# Patient Record
Sex: MALE | Race: WHITE | Employment: FULL TIME | URBAN - METROPOLITAN AREA
[De-identification: names, ages, dates, MRNs, and addresses within clinical notes are randomized per-mention and may not be internally consistent; named-entity substitution may affect disease eponyms.]

---

## 2017-01-23 ENCOUNTER — OFFICE VISIT (OUTPATIENT)
Dept: INTERNAL MEDICINE CLINIC | Age: 67
End: 2017-01-23

## 2017-01-23 VITALS
HEART RATE: 70 BPM | RESPIRATION RATE: 14 BRPM | SYSTOLIC BLOOD PRESSURE: 140 MMHG | DIASTOLIC BLOOD PRESSURE: 80 MMHG | WEIGHT: 216 LBS | BODY MASS INDEX: 34.72 KG/M2 | HEIGHT: 66 IN

## 2017-01-23 DIAGNOSIS — Z23 NEED FOR PNEUMOCOCCAL VACCINATION: ICD-10-CM

## 2017-01-23 DIAGNOSIS — F41.9 ANXIETY: ICD-10-CM

## 2017-01-23 DIAGNOSIS — I10 ESSENTIAL HYPERTENSION: Primary | ICD-10-CM

## 2017-01-23 DIAGNOSIS — I10 ESSENTIAL HYPERTENSION: ICD-10-CM

## 2017-01-23 DIAGNOSIS — E78.5 HYPERLIPIDEMIA, UNSPECIFIED HYPERLIPIDEMIA TYPE: ICD-10-CM

## 2017-01-23 DIAGNOSIS — Z11.59 NEED FOR HEPATITIS C SCREENING TEST: ICD-10-CM

## 2017-01-23 LAB
A/G RATIO: 1.4 (ref 1.1–2.2)
ALBUMIN SERPL-MCNC: 4 G/DL (ref 3.4–5)
ALP BLD-CCNC: 63 U/L (ref 40–129)
ALT SERPL-CCNC: 21 U/L (ref 10–40)
ANION GAP SERPL CALCULATED.3IONS-SCNC: 17 MMOL/L (ref 3–16)
AST SERPL-CCNC: 23 U/L (ref 15–37)
BASOPHILS ABSOLUTE: 0.1 K/UL (ref 0–0.2)
BASOPHILS RELATIVE PERCENT: 0.8 %
BILIRUB SERPL-MCNC: 0.4 MG/DL (ref 0–1)
BUN BLDV-MCNC: 18 MG/DL (ref 7–20)
CALCIUM SERPL-MCNC: 9.2 MG/DL (ref 8.3–10.6)
CHLORIDE BLD-SCNC: 101 MMOL/L (ref 99–110)
CO2: 24 MMOL/L (ref 21–32)
CREAT SERPL-MCNC: 0.9 MG/DL (ref 0.8–1.3)
EOSINOPHILS ABSOLUTE: 0.1 K/UL (ref 0–0.6)
EOSINOPHILS RELATIVE PERCENT: 1.3 %
GFR AFRICAN AMERICAN: >60
GFR NON-AFRICAN AMERICAN: >60
GLOBULIN: 2.8 G/DL
GLUCOSE BLD-MCNC: 83 MG/DL (ref 70–99)
HCT VFR BLD CALC: 42.7 % (ref 40.5–52.5)
HEMOGLOBIN: 14.4 G/DL (ref 13.5–17.5)
HEPATITIS C ANTIBODY INTERPRETATION: NORMAL
LYMPHOCYTES ABSOLUTE: 1.9 K/UL (ref 1–5.1)
LYMPHOCYTES RELATIVE PERCENT: 18.9 %
MCH RBC QN AUTO: 29.8 PG (ref 26–34)
MCHC RBC AUTO-ENTMCNC: 33.9 G/DL (ref 31–36)
MCV RBC AUTO: 87.9 FL (ref 80–100)
MONOCYTES ABSOLUTE: 1.2 K/UL (ref 0–1.3)
MONOCYTES RELATIVE PERCENT: 11.9 %
NEUTROPHILS ABSOLUTE: 6.7 K/UL (ref 1.7–7.7)
NEUTROPHILS RELATIVE PERCENT: 67.1 %
PDW BLD-RTO: 13.7 % (ref 12.4–15.4)
PLATELET # BLD: 230 K/UL (ref 135–450)
PMV BLD AUTO: 8.9 FL (ref 5–10.5)
POTASSIUM SERPL-SCNC: 3.9 MMOL/L (ref 3.5–5.1)
RBC # BLD: 4.85 M/UL (ref 4.2–5.9)
SODIUM BLD-SCNC: 142 MMOL/L (ref 136–145)
TOTAL PROTEIN: 6.8 G/DL (ref 6.4–8.2)
WBC # BLD: 10 K/UL (ref 4–11)

## 2017-01-23 PROCEDURE — 90471 IMMUNIZATION ADMIN: CPT | Performed by: INTERNAL MEDICINE

## 2017-01-23 PROCEDURE — 99213 OFFICE O/P EST LOW 20 MIN: CPT | Performed by: INTERNAL MEDICINE

## 2017-01-23 PROCEDURE — 90732 PPSV23 VACC 2 YRS+ SUBQ/IM: CPT | Performed by: INTERNAL MEDICINE

## 2017-01-23 RX ORDER — CITALOPRAM 20 MG/1
TABLET ORAL
Qty: 30 TABLET | Refills: 5 | Status: SHIPPED | OUTPATIENT
Start: 2017-01-23 | End: 2017-05-05 | Stop reason: SDUPTHER

## 2017-01-23 RX ORDER — TELMISARTAN AND HYDROCHLORTHIAZIDE 80; 25 MG/1; MG/1
TABLET ORAL
Qty: 30 TABLET | Refills: 5 | Status: SHIPPED | OUTPATIENT
Start: 2017-01-23 | End: 2017-05-05 | Stop reason: SDUPTHER

## 2017-01-23 RX ORDER — PRAVASTATIN SODIUM 80 MG/1
TABLET ORAL
Qty: 30 TABLET | Refills: 5 | Status: SHIPPED | OUTPATIENT
Start: 2017-01-23 | End: 2017-05-05 | Stop reason: SDUPTHER

## 2017-01-23 ASSESSMENT — ENCOUNTER SYMPTOMS
BACK PAIN: 0
WHEEZING: 0
ABDOMINAL PAIN: 0
NAUSEA: 0
VOMITING: 0
SHORTNESS OF BREATH: 0
RHINORRHEA: 0

## 2017-05-05 ENCOUNTER — OFFICE VISIT (OUTPATIENT)
Dept: INTERNAL MEDICINE CLINIC | Age: 67
End: 2017-05-05

## 2017-05-05 VITALS
BODY MASS INDEX: 35.2 KG/M2 | SYSTOLIC BLOOD PRESSURE: 120 MMHG | DIASTOLIC BLOOD PRESSURE: 80 MMHG | WEIGHT: 219 LBS | HEIGHT: 66 IN | RESPIRATION RATE: 14 BRPM | HEART RATE: 70 BPM

## 2017-05-05 DIAGNOSIS — Z00.00 ROUTINE GENERAL MEDICAL EXAMINATION AT A HEALTH CARE FACILITY: Primary | ICD-10-CM

## 2017-05-05 DIAGNOSIS — I10 ESSENTIAL HYPERTENSION: ICD-10-CM

## 2017-05-05 DIAGNOSIS — E78.5 HYPERLIPIDEMIA, UNSPECIFIED HYPERLIPIDEMIA TYPE: ICD-10-CM

## 2017-05-05 DIAGNOSIS — N40.0 HYPERTROPHY OF PROSTATE WITHOUT URINARY OBSTRUCTION: ICD-10-CM

## 2017-05-05 DIAGNOSIS — F41.9 ANXIETY: ICD-10-CM

## 2017-05-05 LAB
BILIRUBIN, POC: NORMAL
BLOOD URINE, POC: NORMAL
CLARITY, POC: NORMAL
COLOR, POC: NORMAL
GLUCOSE URINE, POC: NORMAL
KETONES, POC: NORMAL
LEUKOCYTE EST, POC: NORMAL
NITRITE, POC: NORMAL
PH, POC: NORMAL
PROSTATE SPECIFIC ANTIGEN: 2.29 NG/ML (ref 0–4)
PROTEIN, POC: NORMAL
SPECIFIC GRAVITY, POC: NORMAL
UROBILINOGEN, POC: NORMAL

## 2017-05-05 PROCEDURE — 99397 PER PM REEVAL EST PAT 65+ YR: CPT | Performed by: INTERNAL MEDICINE

## 2017-05-05 PROCEDURE — 81002 URINALYSIS NONAUTO W/O SCOPE: CPT | Performed by: INTERNAL MEDICINE

## 2017-05-05 RX ORDER — CITALOPRAM 20 MG/1
TABLET ORAL
Qty: 30 TABLET | Refills: 5 | Status: SHIPPED | OUTPATIENT
Start: 2017-05-05 | End: 2017-07-31 | Stop reason: SDUPTHER

## 2017-05-05 RX ORDER — PRAVASTATIN SODIUM 80 MG/1
TABLET ORAL
Qty: 30 TABLET | Refills: 5 | Status: SHIPPED | OUTPATIENT
Start: 2017-05-05 | End: 2017-07-31 | Stop reason: SDUPTHER

## 2017-05-05 RX ORDER — TELMISARTAN AND HYDROCHLORTHIAZIDE 80; 25 MG/1; MG/1
TABLET ORAL
Qty: 30 TABLET | Refills: 5 | Status: SHIPPED | OUTPATIENT
Start: 2017-05-05 | End: 2017-12-01 | Stop reason: SDUPTHER

## 2017-05-05 ASSESSMENT — PATIENT HEALTH QUESTIONNAIRE - PHQ9
2. FEELING DOWN, DEPRESSED OR HOPELESS: 0
1. LITTLE INTEREST OR PLEASURE IN DOING THINGS: 0
SUM OF ALL RESPONSES TO PHQ QUESTIONS 1-9: 0
SUM OF ALL RESPONSES TO PHQ9 QUESTIONS 1 & 2: 0

## 2017-05-05 ASSESSMENT — ENCOUNTER SYMPTOMS
NAUSEA: 0
VOMITING: 0
BACK PAIN: 0
ABDOMINAL PAIN: 0
WHEEZING: 0
RHINORRHEA: 0
SHORTNESS OF BREATH: 0

## 2017-07-31 RX ORDER — PRAVASTATIN SODIUM 80 MG/1
TABLET ORAL
Qty: 30 TABLET | Refills: 5 | Status: SHIPPED | OUTPATIENT
Start: 2017-07-31 | End: 2017-12-01 | Stop reason: SDUPTHER

## 2017-07-31 RX ORDER — CITALOPRAM 20 MG/1
TABLET ORAL
Qty: 30 TABLET | Refills: 5 | Status: SHIPPED | OUTPATIENT
Start: 2017-07-31 | End: 2017-12-01 | Stop reason: SDUPTHER

## 2017-12-01 ENCOUNTER — TELEPHONE (OUTPATIENT)
Dept: INTERNAL MEDICINE CLINIC | Age: 67
End: 2017-12-01

## 2017-12-01 ENCOUNTER — HOSPITAL ENCOUNTER (OUTPATIENT)
Dept: OTHER | Age: 67
Discharge: HOME | End: 2017-12-01
Attending: INTERNAL MEDICINE | Admitting: INTERNAL MEDICINE

## 2017-12-01 ENCOUNTER — OFFICE VISIT (OUTPATIENT)
Dept: INTERNAL MEDICINE CLINIC | Age: 67
End: 2017-12-01

## 2017-12-01 VITALS
OXYGEN SATURATION: 93 % | DIASTOLIC BLOOD PRESSURE: 80 MMHG | RESPIRATION RATE: 14 BRPM | HEIGHT: 66 IN | HEART RATE: 80 BPM | WEIGHT: 227 LBS | TEMPERATURE: 98.5 F | SYSTOLIC BLOOD PRESSURE: 138 MMHG | BODY MASS INDEX: 36.48 KG/M2

## 2017-12-01 DIAGNOSIS — Z23 NEED FOR INFLUENZA VACCINATION: ICD-10-CM

## 2017-12-01 DIAGNOSIS — R09.89 PULMONARY VASCULAR CONGESTION: Primary | ICD-10-CM

## 2017-12-01 DIAGNOSIS — J20.9 ACUTE BRONCHITIS, UNSPECIFIED ORGANISM: ICD-10-CM

## 2017-12-01 DIAGNOSIS — F41.9 ANXIETY: ICD-10-CM

## 2017-12-01 DIAGNOSIS — E78.5 HYPERLIPIDEMIA, UNSPECIFIED HYPERLIPIDEMIA TYPE: ICD-10-CM

## 2017-12-01 DIAGNOSIS — I10 ESSENTIAL HYPERTENSION: Primary | ICD-10-CM

## 2017-12-01 PROCEDURE — 90662 IIV NO PRSV INCREASED AG IM: CPT | Performed by: INTERNAL MEDICINE

## 2017-12-01 PROCEDURE — 99214 OFFICE O/P EST MOD 30 MIN: CPT | Performed by: INTERNAL MEDICINE

## 2017-12-01 PROCEDURE — 90471 IMMUNIZATION ADMIN: CPT | Performed by: INTERNAL MEDICINE

## 2017-12-01 RX ORDER — CITALOPRAM 20 MG/1
TABLET ORAL
Qty: 30 TABLET | Refills: 5 | Status: SHIPPED | OUTPATIENT
Start: 2017-12-01 | End: 2018-03-06 | Stop reason: SDUPTHER

## 2017-12-01 RX ORDER — TELMISARTAN AND HYDROCHLORTHIAZIDE 80; 25 MG/1; MG/1
TABLET ORAL
Qty: 30 TABLET | Refills: 5 | Status: SHIPPED | OUTPATIENT
Start: 2017-12-01 | End: 2018-03-06 | Stop reason: SDUPTHER

## 2017-12-01 RX ORDER — PROMETHAZINE HYDROCHLORIDE AND CODEINE PHOSPHATE 6.25; 1 MG/5ML; MG/5ML
5 SYRUP ORAL EVERY 4 HOURS PRN
Qty: 150 ML | Refills: 0 | Status: SHIPPED | OUTPATIENT
Start: 2017-12-01 | End: 2018-03-06

## 2017-12-01 RX ORDER — LEVOFLOXACIN 750 MG/1
750 TABLET ORAL DAILY
Qty: 5 TABLET | Refills: 0 | Status: SHIPPED | OUTPATIENT
Start: 2017-12-01 | End: 2017-12-06

## 2017-12-01 RX ORDER — ALBUTEROL SULFATE 90 UG/1
2 AEROSOL, METERED RESPIRATORY (INHALATION) EVERY 6 HOURS PRN
Qty: 1 INHALER | Refills: 3 | Status: SHIPPED | OUTPATIENT
Start: 2017-12-01 | End: 2018-03-06

## 2017-12-01 RX ORDER — PRAVASTATIN SODIUM 80 MG/1
TABLET ORAL
Qty: 30 TABLET | Refills: 5 | Status: SHIPPED | OUTPATIENT
Start: 2017-12-01 | End: 2018-03-06 | Stop reason: SDUPTHER

## 2017-12-01 ASSESSMENT — ENCOUNTER SYMPTOMS
BACK PAIN: 0
COUGH: 1
SHORTNESS OF BREATH: 1
RHINORRHEA: 0
ABDOMINAL PAIN: 0
WHEEZING: 0
NAUSEA: 0
VOMITING: 0

## 2017-12-01 NOTE — TELEPHONE ENCOUNTER
----- Message from Elen Mercado MD sent at 12/1/2017  4:39 PM EST -----  Mild congestion  Check ECHO: vascular congestion

## 2017-12-01 NOTE — PROGRESS NOTES
Subjective:      Patient ID: Marylee Sato is a 75 y.o. male. HPI    Patient is here for follow up of hypertension and hyperlipidemia. Patient's BP is controlled on current medications. No chest pain or dyspnea. His cholesterol is at goal. No myalgia. His anxiety is controlled. He has had a cough for one month. He is having some chest tightness. No fever. He is coughing up some yellow sputum. No chest pain. Review of Systems   Constitutional: Negative for activity change and appetite change. HENT: Negative for postnasal drip and rhinorrhea. Respiratory: Positive for cough and shortness of breath. Negative for wheezing. Cardiovascular: Negative for chest pain, palpitations and leg swelling. Gastrointestinal: Negative for abdominal pain, nausea and vomiting. Genitourinary: Negative for difficulty urinating and frequency. Musculoskeletal: Negative for back pain and joint swelling. Skin: Negative for rash. Neurological: Negative for light-headedness. Psychiatric/Behavioral: Positive for sleep disturbance. Current Outpatient Prescriptions   Medication Sig Dispense Refill    pravastatin (PRAVACHOL) 80 MG tablet TAKE ONE TABLET DAILY AT BEDTIME 30 tablet 5    citalopram (CELEXA) 20 MG tablet TAKE ONE TABLET DAILY 30 tablet 5    telmisartan-hydrochlorothiazide (MICARDIS HCT) 80-25 MG per tablet TAKE ONE TABLET DAILY 30 tablet 5    promethazine-codeine (PHENERGAN WITH CODEINE) 6.25-10 MG/5ML syrup Take 5 mLs by mouth every 4 hours as needed for Cough . 150 mL 0    levofloxacin (LEVAQUIN) 750 MG tablet Take 1 tablet by mouth daily for 5 days 5 tablet 0     No current facility-administered medications for this visit. There are no changes to past medical history, family history, social history or review of systems(except as noted in the history section) since prior note (all reviewed with patient).     /80 (Site: Left Arm, Position: Sitting, Cuff Size: Large Adult)

## 2017-12-20 ENCOUNTER — HOSPITAL ENCOUNTER (OUTPATIENT)
Dept: NON INVASIVE DIAGNOSTICS | Age: 67
Discharge: HOME | End: 2017-12-20
Attending: INTERNAL MEDICINE | Admitting: INTERNAL MEDICINE

## 2017-12-20 DIAGNOSIS — E78.5 HYPERLIPIDEMIA, UNSPECIFIED HYPERLIPIDEMIA TYPE: ICD-10-CM

## 2017-12-20 DIAGNOSIS — R09.89 OTHER SPECIFIED SYMPTOMS AND SIGNS INVOLVING THE CIRCULATORY AND RESPIRATORY SYSTEMS: ICD-10-CM

## 2017-12-20 LAB
A/G RATIO: 1.4 (ref 1.1–2.2)
ALBUMIN SERPL-MCNC: 4.2 G/DL (ref 3.4–5)
ALP BLD-CCNC: 61 U/L (ref 40–129)
ALT SERPL-CCNC: 18 U/L (ref 10–40)
ANION GAP SERPL CALCULATED.3IONS-SCNC: 15 MMOL/L (ref 3–16)
AST SERPL-CCNC: 20 U/L (ref 15–37)
BASOPHILS ABSOLUTE: 0 K/UL (ref 0–0.2)
BASOPHILS RELATIVE PERCENT: 0.3 %
BILIRUB SERPL-MCNC: 0.7 MG/DL (ref 0–1)
BUN BLDV-MCNC: 17 MG/DL (ref 7–20)
CALCIUM SERPL-MCNC: 9.8 MG/DL (ref 8.3–10.6)
CHLORIDE BLD-SCNC: 102 MMOL/L (ref 99–110)
CHOLESTEROL, TOTAL: 166 MG/DL (ref 0–199)
CO2: 26 MMOL/L (ref 21–32)
CREAT SERPL-MCNC: 0.9 MG/DL (ref 0.8–1.3)
EOSINOPHILS ABSOLUTE: 0.1 K/UL (ref 0–0.6)
EOSINOPHILS RELATIVE PERCENT: 1.5 %
GFR AFRICAN AMERICAN: >60
GFR NON-AFRICAN AMERICAN: >60
GLOBULIN: 3.1 G/DL
GLUCOSE BLD-MCNC: 89 MG/DL (ref 70–99)
HCT VFR BLD CALC: 45.2 % (ref 40.5–52.5)
HDLC SERPL-MCNC: 61 MG/DL (ref 40–60)
HEMOGLOBIN: 15.2 G/DL (ref 13.5–17.5)
LDL CHOLESTEROL CALCULATED: 86 MG/DL
LV EF: 55 %
LVEF MODALITY: NORMAL
LYMPHOCYTES ABSOLUTE: 1.6 K/UL (ref 1–5.1)
LYMPHOCYTES RELATIVE PERCENT: 21.6 %
MCH RBC QN AUTO: 30.2 PG (ref 26–34)
MCHC RBC AUTO-ENTMCNC: 33.6 G/DL (ref 31–36)
MCV RBC AUTO: 89.8 FL (ref 80–100)
MONOCYTES ABSOLUTE: 0.7 K/UL (ref 0–1.3)
MONOCYTES RELATIVE PERCENT: 8.9 %
NEUTROPHILS ABSOLUTE: 5.1 K/UL (ref 1.7–7.7)
NEUTROPHILS RELATIVE PERCENT: 67.7 %
PDW BLD-RTO: 13.8 % (ref 12.4–15.4)
PLATELET # BLD: 219 K/UL (ref 135–450)
PMV BLD AUTO: 9.1 FL (ref 5–10.5)
POTASSIUM SERPL-SCNC: 4.1 MMOL/L (ref 3.5–5.1)
RBC # BLD: 5.04 M/UL (ref 4.2–5.9)
SODIUM BLD-SCNC: 143 MMOL/L (ref 136–145)
TOTAL PROTEIN: 7.3 G/DL (ref 6.4–8.2)
TRIGL SERPL-MCNC: 96 MG/DL (ref 0–150)
TSH SERPL DL<=0.05 MIU/L-ACNC: 2.52 UIU/ML (ref 0.27–4.2)
URIC ACID, SERUM: 7.2 MG/DL (ref 3.5–7.2)
VLDLC SERPL CALC-MCNC: 19 MG/DL
WBC # BLD: 7.5 K/UL (ref 4–11)

## 2018-02-05 ENCOUNTER — TELEPHONE (OUTPATIENT)
Dept: INTERNAL MEDICINE CLINIC | Age: 68
End: 2018-02-05

## 2018-02-05 RX ORDER — AZITHROMYCIN 250 MG/1
TABLET, FILM COATED ORAL
Qty: 1 PACKET | Refills: 0 | Status: SHIPPED | OUTPATIENT
Start: 2018-02-05 | End: 2018-03-06

## 2018-02-05 NOTE — TELEPHONE ENCOUNTER
----- Message from Elizabeth Tejeda MD sent at 2/5/2018 11:52 AM EST -----  Contact: pt [phone]  yes  ----- Message -----  From: Lazaro Carnes  Sent: 2/5/2018  11:16 AM  To: Elizabeth Tejeda MD    Interaction with citalopram Is this ok?  ----- Message -----  From: Elizabeth Tejeda MD  Sent: 2/5/2018  10:31 AM  To: Lazaro Carnes    Z carolin  ----- Message -----  From: Allison Vidal  Sent: 2/5/2018   8:40 AM  To: Elizabeth Tejeda MD    Pt wife was in last week and saw [employer], and was prescribed Z carolin. Wife has gotten much better. Pt now has same symptoms wife had, and was wondering if you could prescribe z carolin for him. I told him you might want to come in, and he said he didn't want to drive in the weather, but if he has to he will. If you are willing, please send to [facility].    Last 12-1-17  Next 3-6-18  MT

## 2018-03-06 ENCOUNTER — OFFICE VISIT (OUTPATIENT)
Dept: INTERNAL MEDICINE CLINIC | Age: 68
End: 2018-03-06

## 2018-03-06 VITALS
HEART RATE: 84 BPM | SYSTOLIC BLOOD PRESSURE: 132 MMHG | BODY MASS INDEX: 36 KG/M2 | HEIGHT: 66 IN | RESPIRATION RATE: 14 BRPM | WEIGHT: 224 LBS | DIASTOLIC BLOOD PRESSURE: 80 MMHG

## 2018-03-06 DIAGNOSIS — I34.0 MILD MITRAL REGURGITATION: ICD-10-CM

## 2018-03-06 DIAGNOSIS — E78.5 HYPERLIPIDEMIA, UNSPECIFIED HYPERLIPIDEMIA TYPE: ICD-10-CM

## 2018-03-06 DIAGNOSIS — I10 ESSENTIAL HYPERTENSION: Primary | ICD-10-CM

## 2018-03-06 DIAGNOSIS — I35.1 MODERATE AORTIC INSUFFICIENCY: ICD-10-CM

## 2018-03-06 DIAGNOSIS — F41.9 ANXIETY: ICD-10-CM

## 2018-03-06 DIAGNOSIS — N40.0 HYPERTROPHY OF PROSTATE WITHOUT URINARY OBSTRUCTION: ICD-10-CM

## 2018-03-06 PROCEDURE — 99214 OFFICE O/P EST MOD 30 MIN: CPT | Performed by: INTERNAL MEDICINE

## 2018-03-06 RX ORDER — PRAVASTATIN SODIUM 80 MG/1
TABLET ORAL
Qty: 30 TABLET | Refills: 5 | Status: SHIPPED | OUTPATIENT
Start: 2018-03-06 | End: 2019-01-22 | Stop reason: SDUPTHER

## 2018-03-06 RX ORDER — TELMISARTAN AND HYDROCHLORTHIAZIDE 80; 25 MG/1; MG/1
TABLET ORAL
Qty: 30 TABLET | Refills: 5 | Status: SHIPPED | OUTPATIENT
Start: 2018-03-06 | End: 2019-01-22 | Stop reason: SDUPTHER

## 2018-03-06 RX ORDER — CITALOPRAM 20 MG/1
TABLET ORAL
Qty: 30 TABLET | Refills: 5 | Status: SHIPPED | OUTPATIENT
Start: 2018-03-06 | End: 2019-01-22 | Stop reason: SDUPTHER

## 2018-03-06 ASSESSMENT — ENCOUNTER SYMPTOMS
ABDOMINAL PAIN: 0
VOMITING: 0
RHINORRHEA: 0
NAUSEA: 0
SHORTNESS OF BREATH: 0
WHEEZING: 0
COUGH: 0
BACK PAIN: 0

## 2018-03-06 NOTE — PATIENT INSTRUCTIONS
variety of cut-up vegetables with a low-fat dip as an appetizer instead of chips and dip. ¨ Sprinkle sunflower seeds or chopped almonds over salads. Or try adding chopped walnuts or almonds to cooked vegetables. ¨ Try some vegetarian meals using beans and peas. Add garbanzo or kidney beans to salads. Make burritos and tacos with mashed vasquez beans or black beans. Where can you learn more? Go to [email or web address]. org and sign in to your [employer] account. Enter R576 in the [employer] box to learn more about \"DASH Diet: Care Instructions. \"     If you do not have an account, please click on the \"Sign Up Now\" link. Current as of: September 21, 2016  Content Version: 11.5  © 5492-9109 Healthwise, Incorporated. Care instructions adapted under license by [facility] ([facility]). If you have questions about a medical condition or this instruction, always ask your healthcare professional. [address] any warranty or liability for your use of this information.

## 2018-03-06 NOTE — PROGRESS NOTES
Subjective:      Patient ID: Gentry Terrazas is a 75 y.o. . HPI    Patient is here for follow up of hypertension and hyperlipidemia. He had two episodes of syncope associated with coughing. He had LOC for a few seconds. His ECHO showed moderate AI and mild MR. Patient's BP is controlled on current medications. No chest pain or dyspnea. His cholesterol is at goal. No myalgia. His anxiety is controlled. His cough is resolved. He is urinating without any issues. No urgency or increased frequency. Review of Systems   Constitutional: Negative for activity change and appetite change. HENT: Negative for postnasal drip and rhinorrhea. Respiratory: Negative for cough, shortness of breath and wheezing. Cardiovascular: Negative for chest pain, palpitations and leg swelling. Gastrointestinal: Negative for abdominal pain, nausea and vomiting. Genitourinary: Negative for difficulty urinating and frequency. Musculoskeletal: Negative for back pain and joint swelling. Skin: Negative for rash. Neurological: Negative for light-headedness. Psychiatric/Behavioral: Negative for sleep disturbance. Current Outpatient Prescriptions   Medication Sig Dispense Refill    pravastatin (PRAVACHOL) 80 MG tablet TAKE ONE TABLET DAILY AT BEDTIME 30 tablet 5    citalopram (CELEXA) 20 MG tablet TAKE ONE TABLET DAILY 30 tablet 5    telmisartan-hydrochlorothiazide (MICARDIS HCT) 80-25 MG per tablet TAKE ONE TABLET DAILY 30 tablet 5     No current facility-administered medications for this visit. There are no changes to past medical history, family history, social history or review of systems(except as noted in the history section) since prior note (all reviewed with patient).     /80 (Site: Left Arm, Position: Sitting, Cuff Size: Large Adult)   Pulse 84   Resp 14   Ht 5' 6\" (1.676 m)   Wt 224 lb (101.6 kg)   BMI 36.15 kg/m²      Objective:   Physical Exam   Constitutional: He appears instructions  Was a self-tracking handout given in paper form or via [employer]? Yes    Requested Prescriptions     Pending Prescriptions Disp Refills    telmisartan-hydrochlorothiazide (MICARDIS HCT) 80-25 MG per tablet 30 tablet 5     Sig: TAKE ONE TABLET DAILY    citalopram (CELEXA) 20 MG tablet 30 tablet 5     Sig: TAKE ONE TABLET DAILY    pravastatin (PRAVACHOL) 80 MG tablet 30 tablet 5     Sig: TAKE ONE TABLET DAILY AT BEDTIME       All patient questions answered. Patient voiced understanding. Quality Measures    Body mass index is 36.15 kg/m². Elevated. Weight control planned discussed conventional weight loss and Healthy diet and regular exercise. BP: 132/80. Blood pressure is normal. Treatment plan consists of No treatment change needed. Fall Risk 5/5/2017 3/7/2016 1/26/2015   2 or more falls in past year? no no no   Fall with injury in past year? no no no     The patient does not have a history of falls. I did not - not indicated , complete a risk assessment for falls. A plan of care for falls No Treatment plan indicated    Lab Results   Component Value Date    LDLCALC 86 12/20/2017    (goal LDL reduction with dx if diabetes is 50% LDL reduction)    PHQ Scores 5/5/2017 3/7/2016 1/26/2015   PHQ2 Score 0 0 0   PHQ9 Score 0 0 0     Interpretation of Total Score Depression Severity: 1-4 = Minimal depression, 5-9 = Mild depression, 10-14 = Moderate depression, 15-19 = Moderately severe depression, 20-27 = Severe depression       Decrease calorie intake. Exercise,weight loss recommended. The current medical regimen is effective;  continue present plan and medications. See orders.

## 2018-06-21 ENCOUNTER — TELEPHONE (OUTPATIENT)
Dept: INTERNAL MEDICINE CLINIC | Age: 68
End: 2018-06-21

## 2018-09-04 ENCOUNTER — OFFICE VISIT (OUTPATIENT)
Dept: INTERNAL MEDICINE CLINIC | Age: 68
End: 2018-09-04

## 2018-09-04 VITALS
BODY MASS INDEX: 35.68 KG/M2 | WEIGHT: 222 LBS | HEIGHT: 66 IN | SYSTOLIC BLOOD PRESSURE: 110 MMHG | HEART RATE: 80 BPM | DIASTOLIC BLOOD PRESSURE: 80 MMHG | RESPIRATION RATE: 14 BRPM

## 2018-09-04 DIAGNOSIS — F41.9 ANXIETY: ICD-10-CM

## 2018-09-04 DIAGNOSIS — I35.1 MODERATE AORTIC INSUFFICIENCY: ICD-10-CM

## 2018-09-04 DIAGNOSIS — I34.0 MILD MITRAL REGURGITATION: ICD-10-CM

## 2018-09-04 DIAGNOSIS — E78.5 HYPERLIPIDEMIA, UNSPECIFIED HYPERLIPIDEMIA TYPE: ICD-10-CM

## 2018-09-04 DIAGNOSIS — Z01.818 PREOP EXAM FOR INTERNAL MEDICINE: Primary | ICD-10-CM

## 2018-09-04 DIAGNOSIS — H57.9 LEFT EYE COMPLAINT: ICD-10-CM

## 2018-09-04 DIAGNOSIS — I10 ESSENTIAL HYPERTENSION: ICD-10-CM

## 2018-09-04 PROCEDURE — 99214 OFFICE O/P EST MOD 30 MIN: CPT | Performed by: INTERNAL MEDICINE

## 2018-09-04 NOTE — PROGRESS NOTES
(1.676 m)   Wt 222 lb (100.7 kg)   BMI 35.83 kg/m²     General Appearance:  Alert, cooperative, no distress, appears stated age   Head:  Normocephalic, without obvious abnormality, atraumatic   Eyes:  PERRL, conjunctiva/corneas clear, EOM's intact, fundi benign, both eyes   Ears:  deferred   Nose: Nares normal, septum midline, mucosa normal, no drainage or sinus tenderness   Throat: Lips, mucosa, and tongue normal; teeth and gums normal   Neck: Supple, symmetrical, trachea midline, no adenopathy, thyroid: not enlarged, symmetric, no tenderness/mass/nodules, no carotid bruit or JVD   Back:   deferred   Lungs:   Clear to auscultation bilaterally, respirations unlabored   Chest Wall:  No tenderness or deformity   Heart:  Regular rate and rhythm, S1, S2 normal, no murmur, rub or gallop   Abdomen:   Soft, non-tender, bowel sounds active all four quadrants,  no masses, no organomegaly   Genitalia:  deferred   Rectal:  deferred   Extremities: Extremities normal, atraumatic, no cyanosis or edema   Pulses: 2+ and symmetric   Skin: Skin color, texture, turgor normal, no rashes or lesions   Lymph nodes: Cervical, supraclavicular, and axillary nodes normal   Neurologic: Normal                Assessment:       Diagnosis Orders   1. Preop exam for internal medicine     2. Left eye complaint     3. Essential hypertension     4. Hyperlipidemia, unspecified hyperlipidemia type     5. Moderate aortic insufficiency     6. Mild mitral regurgitation     7. Anxiety           70 y.o. male with planned surgery as above. Plan:     Patient medically cleared for above planned procedure.

## 2019-01-22 ENCOUNTER — OFFICE VISIT (OUTPATIENT)
Dept: INTERNAL MEDICINE CLINIC | Age: 69
End: 2019-01-22

## 2019-01-22 VITALS
HEIGHT: 66 IN | RESPIRATION RATE: 14 BRPM | WEIGHT: 224 LBS | SYSTOLIC BLOOD PRESSURE: 120 MMHG | HEART RATE: 70 BPM | DIASTOLIC BLOOD PRESSURE: 80 MMHG | BODY MASS INDEX: 36 KG/M2

## 2019-01-22 DIAGNOSIS — E78.5 HYPERLIPIDEMIA, UNSPECIFIED HYPERLIPIDEMIA TYPE: ICD-10-CM

## 2019-01-22 DIAGNOSIS — I34.0 MILD MITRAL REGURGITATION: ICD-10-CM

## 2019-01-22 DIAGNOSIS — I10 ESSENTIAL HYPERTENSION: Primary | ICD-10-CM

## 2019-01-22 DIAGNOSIS — I35.1 MODERATE AORTIC INSUFFICIENCY: ICD-10-CM

## 2019-01-22 DIAGNOSIS — N40.0 HYPERTROPHY OF PROSTATE WITHOUT URINARY OBSTRUCTION: ICD-10-CM

## 2019-01-22 DIAGNOSIS — F41.9 ANXIETY: ICD-10-CM

## 2019-01-22 PROCEDURE — 99214 OFFICE O/P EST MOD 30 MIN: CPT | Performed by: INTERNAL MEDICINE

## 2019-01-22 RX ORDER — TELMISARTAN AND HYDROCHLORTHIAZIDE 80; 25 MG/1; MG/1
TABLET ORAL
Qty: 30 TABLET | Refills: 5 | Status: SHIPPED | OUTPATIENT
Start: 2019-01-22 | End: 2019-04-16 | Stop reason: SDUPTHER

## 2019-01-22 RX ORDER — PRAVASTATIN SODIUM 80 MG/1
TABLET ORAL
Qty: 30 TABLET | Refills: 5 | Status: SHIPPED | OUTPATIENT
Start: 2019-01-22 | End: 2019-04-16 | Stop reason: SDUPTHER

## 2019-01-22 RX ORDER — CITALOPRAM 20 MG/1
TABLET ORAL
Qty: 30 TABLET | Refills: 5 | Status: SHIPPED | OUTPATIENT
Start: 2019-01-22 | End: 2019-04-16 | Stop reason: SDUPTHER

## 2019-01-22 ASSESSMENT — ENCOUNTER SYMPTOMS
ABDOMINAL PAIN: 0
RHINORRHEA: 0
SHORTNESS OF BREATH: 0
COUGH: 0
NAUSEA: 0
BACK PAIN: 0
WHEEZING: 0
VOMITING: 0

## 2019-01-23 DIAGNOSIS — I10 ESSENTIAL HYPERTENSION: ICD-10-CM

## 2019-01-23 LAB
A/G RATIO: 1.5 (ref 1.1–2.2)
ALBUMIN SERPL-MCNC: 4.2 G/DL (ref 3.4–5)
ALP BLD-CCNC: 57 U/L (ref 40–129)
ALT SERPL-CCNC: 18 U/L (ref 10–40)
ANION GAP SERPL CALCULATED.3IONS-SCNC: 15 MMOL/L (ref 3–16)
AST SERPL-CCNC: 21 U/L (ref 15–37)
BASOPHILS ABSOLUTE: 0 K/UL (ref 0–0.2)
BASOPHILS RELATIVE PERCENT: 0.5 %
BILIRUB SERPL-MCNC: 0.8 MG/DL (ref 0–1)
BUN BLDV-MCNC: 16 MG/DL (ref 7–20)
CALCIUM SERPL-MCNC: 9.6 MG/DL (ref 8.3–10.6)
CHLORIDE BLD-SCNC: 103 MMOL/L (ref 99–110)
CHOLESTEROL, TOTAL: 182 MG/DL (ref 0–199)
CO2: 26 MMOL/L (ref 21–32)
CREAT SERPL-MCNC: 0.8 MG/DL (ref 0.8–1.3)
EOSINOPHILS ABSOLUTE: 0.1 K/UL (ref 0–0.6)
EOSINOPHILS RELATIVE PERCENT: 1.8 %
GFR AFRICAN AMERICAN: >60
GFR NON-AFRICAN AMERICAN: >60
GLOBULIN: 2.8 G/DL
GLUCOSE BLD-MCNC: 88 MG/DL (ref 70–99)
HCT VFR BLD CALC: 45.6 % (ref 40.5–52.5)
HDLC SERPL-MCNC: 49 MG/DL (ref 40–60)
HEMOGLOBIN: 15.8 G/DL (ref 13.5–17.5)
LDL CHOLESTEROL CALCULATED: 109 MG/DL
LYMPHOCYTES ABSOLUTE: 1.8 K/UL (ref 1–5.1)
LYMPHOCYTES RELATIVE PERCENT: 24.3 %
MCH RBC QN AUTO: 31 PG (ref 26–34)
MCHC RBC AUTO-ENTMCNC: 34.6 G/DL (ref 31–36)
MCV RBC AUTO: 89.6 FL (ref 80–100)
MONOCYTES ABSOLUTE: 0.7 K/UL (ref 0–1.3)
MONOCYTES RELATIVE PERCENT: 9.8 %
NEUTROPHILS ABSOLUTE: 4.7 K/UL (ref 1.7–7.7)
NEUTROPHILS RELATIVE PERCENT: 63.6 %
PDW BLD-RTO: 13.7 % (ref 12.4–15.4)
PLATELET # BLD: 187 K/UL (ref 135–450)
PMV BLD AUTO: 9.1 FL (ref 5–10.5)
POTASSIUM SERPL-SCNC: 3.9 MMOL/L (ref 3.5–5.1)
PROSTATE SPECIFIC ANTIGEN: 2.14 NG/ML (ref 0–4)
RBC # BLD: 5.09 M/UL (ref 4.2–5.9)
SODIUM BLD-SCNC: 144 MMOL/L (ref 136–145)
TOTAL PROTEIN: 7 G/DL (ref 6.4–8.2)
TRIGL SERPL-MCNC: 121 MG/DL (ref 0–150)
URIC ACID, SERUM: 7.1 MG/DL (ref 3.5–7.2)
VLDLC SERPL CALC-MCNC: 24 MG/DL
WBC # BLD: 7.4 K/UL (ref 4–11)

## 2019-04-16 ENCOUNTER — OFFICE VISIT (OUTPATIENT)
Dept: INTERNAL MEDICINE CLINIC | Age: 69
End: 2019-04-16

## 2019-04-16 VITALS
HEART RATE: 50 BPM | RESPIRATION RATE: 14 BRPM | SYSTOLIC BLOOD PRESSURE: 130 MMHG | TEMPERATURE: 99 F | HEIGHT: 66 IN | DIASTOLIC BLOOD PRESSURE: 80 MMHG | BODY MASS INDEX: 35.84 KG/M2 | WEIGHT: 223 LBS

## 2019-04-16 DIAGNOSIS — I35.1 MODERATE AORTIC INSUFFICIENCY: ICD-10-CM

## 2019-04-16 DIAGNOSIS — H26.9 CATARACT OF LEFT EYE, UNSPECIFIED CATARACT TYPE: ICD-10-CM

## 2019-04-16 DIAGNOSIS — I34.0 MILD MITRAL REGURGITATION: ICD-10-CM

## 2019-04-16 DIAGNOSIS — I10 ESSENTIAL HYPERTENSION: ICD-10-CM

## 2019-04-16 DIAGNOSIS — E78.5 HYPERLIPIDEMIA, UNSPECIFIED HYPERLIPIDEMIA TYPE: ICD-10-CM

## 2019-04-16 DIAGNOSIS — Z01.818 PREOP EXAM FOR INTERNAL MEDICINE: Primary | ICD-10-CM

## 2019-04-16 DIAGNOSIS — F41.9 ANXIETY: ICD-10-CM

## 2019-04-16 PROCEDURE — 99214 OFFICE O/P EST MOD 30 MIN: CPT | Performed by: INTERNAL MEDICINE

## 2019-04-16 RX ORDER — ALBUTEROL SULFATE 90 UG/1
2 AEROSOL, METERED RESPIRATORY (INHALATION) 4 TIMES DAILY PRN
Qty: 1 INHALER | Refills: 0 | Status: SHIPPED | OUTPATIENT
Start: 2019-04-16 | End: 2020-08-04 | Stop reason: ALTCHOICE

## 2019-04-16 RX ORDER — TELMISARTAN AND HYDROCHLORTHIAZIDE 80; 25 MG/1; MG/1
TABLET ORAL
Qty: 90 TABLET | Refills: 3 | Status: SHIPPED | OUTPATIENT
Start: 2019-04-16 | End: 2019-10-30 | Stop reason: SDUPTHER

## 2019-04-16 RX ORDER — PRAVASTATIN SODIUM 80 MG/1
TABLET ORAL
Qty: 90 TABLET | Refills: 3 | Status: SHIPPED | OUTPATIENT
Start: 2019-04-16 | End: 2019-08-12

## 2019-04-16 RX ORDER — CITALOPRAM 20 MG/1
TABLET ORAL
Qty: 90 TABLET | Refills: 3 | Status: SHIPPED | OUTPATIENT
Start: 2019-04-16 | End: 2019-04-16 | Stop reason: ALTCHOICE

## 2019-04-16 RX ORDER — ESCITALOPRAM OXALATE 10 MG/1
10 TABLET ORAL DAILY
Qty: 30 TABLET | Refills: 2 | Status: SHIPPED | OUTPATIENT
Start: 2019-04-16 | End: 2020-03-25 | Stop reason: SDUPTHER

## 2019-04-16 RX ORDER — AZITHROMYCIN 250 MG/1
250 TABLET, FILM COATED ORAL SEE ADMIN INSTRUCTIONS
Qty: 6 TABLET | Refills: 0 | Status: ON HOLD | OUTPATIENT
Start: 2019-04-16 | End: 2019-04-20 | Stop reason: HOSPADM

## 2019-04-16 ASSESSMENT — PATIENT HEALTH QUESTIONNAIRE - PHQ9
SUM OF ALL RESPONSES TO PHQ9 QUESTIONS 1 & 2: 0
SUM OF ALL RESPONSES TO PHQ QUESTIONS 1-9: 0
SUM OF ALL RESPONSES TO PHQ QUESTIONS 1-9: 0
1. LITTLE INTEREST OR PLEASURE IN DOING THINGS: 0
2. FEELING DOWN, DEPRESSED OR HOPELESS: 0

## 2019-04-16 NOTE — PROGRESS NOTES
Subjective:      Estefania Bradford is a 70 y.o. male who presents to the office today for a preoperative consultation at the request of surgeon Dr Wolf España who plans on performing left eye phaco and IOL.    Planned anesthesia is Local.       Past Medical History:   Diagnosis Date    Anxiety 12/21/2012    Hyperlipidemia     Hypertension     Hypertrophy of prostate without urinary obstruction     Mild mitral regurgitation 3/6/2018    Moderate aortic insufficiency 3/6/2018     Past Surgical History:   Procedure Laterality Date    COLONOSCOPY  4/21/2008    VITRECTOMY Left 2018     Family History   Problem Relation Age of Onset    Cancer Brother 62        prostate cancer    Other Mother         Lung fibrosis    Cancer Father         Prostate cancer    No Known Problems Sister      Social History     Socioeconomic History    Marital status:      Spouse name: None    Number of children: None    Years of education: None    Highest education level: None   Occupational History    None   Social Needs    Financial resource strain: None    Food insecurity:     Worry: None     Inability: None    Transportation needs:     Medical: None     Non-medical: None   Tobacco Use    Smoking status: Never Smoker    Smokeless tobacco: Never Used   Substance and Sexual Activity    Alcohol use: Yes     Comment: rarely    Drug use: No    Sexual activity: Yes     Partners: Female   Lifestyle    Physical activity:     Days per week: None     Minutes per session: None    Stress: None   Relationships    Social connections:     Talks on phone: None     Gets together: None     Attends Orthodox service: None     Active member of club or organization: None     Attends meetings of clubs or organizations: None     Relationship status: None    Intimate partner violence:     Fear of current or ex partner: None     Emotionally abused: None     Physically abused: None     Forced sexual activity: None   Other Topics Concern    None   Social History Narrative    None     Current Outpatient Medications   Medication Sig Dispense Refill    telmisartan-hydrochlorothiazide (MICARDIS HCT) 80-25 MG per tablet TAKE ONE TABLET DAILY 90 tablet 3    pravastatin (PRAVACHOL) 80 MG tablet TAKE ONE TABLET DAILY AT BEDTIME 90 tablet 3    azithromycin (ZITHROMAX) 250 MG tablet Take 1 tablet by mouth See Admin Instructions for 5 days 500mg on day 1 followed by 250mg on days 2 - 5 6 tablet 0    albuterol sulfate  (90 Base) MCG/ACT inhaler Inhale 2 puffs into the lungs 4 times daily as needed for Wheezing 1 Inhaler 0    escitalopram (LEXAPRO) 10 MG tablet Take 1 tablet by mouth daily 30 tablet 2     No current facility-administered medications for this visit. No Known Allergies  Review of Systems  A comprehensive review of systems was negative.      Planned anesthesia: Local  Known anesthesia problems: no  Bleeding risk: no  Personal or FH of DVT/PE: no  Patient objection to receiving blood products: no     Objective:      /80 (Site: Left Upper Arm, Position: Sitting, Cuff Size: Large Adult)   Pulse 50   Temp 99 °F (37.2 °C) (Oral)   Resp 14   Ht 5' 6\" (1.676 m)   Wt 223 lb (101.2 kg)   BMI 35.99 kg/m²     General Appearance:  Alert, cooperative, no distress, appears stated age   Head:  Normocephalic, without obvious abnormality, atraumatic   Eyes:  PERRL, conjunctiva/corneas clear, EOM's intact, left eye catarac   Ears:  deferred   Nose: Nares normal, septum midline, mucosa normal, no drainage or sinus tenderness   Throat: Lips, mucosa, and tongue normal; teeth and gums normal   Neck: Supple, symmetrical, trachea midline, no adenopathy, thyroid: not enlarged, symmetric, no tenderness/mass/nodules, no carotid bruit or JVD   Back:   Symmetric, no curvature, ROM normal, no CVA tenderness   Lungs:   Few crackles right lung, respirations unlabored   Chest Wall:  No tenderness or deformity   Heart:  Regular rate and rhythm, S1, S2 normal, + murmur, rub or gallop   Abdomen:   Soft, non-tender, bowel sounds active all four quadrants,  no masses, no organomegaly   Genitalia:  deferred   Rectal:  deferred   Extremities: Extremities normal, atraumatic, no cyanosis or edema   Pulses: 2+ and symmetric   Skin: Skin color, texture, turgor normal, no rashes or lesions   Lymph nodes: Cervical, supraclavicular, and axillary nodes normal   Neurologic: Normal                Assessment:       Diagnosis Orders   1. Preop exam for internal medicine     2. Cataract of left eye, unspecified cataract type     3. Moderate aortic insufficiency     4. Mild mitral regurgitation     5. Hyperlipidemia, unspecified hyperlipidemia type     6. Essential hypertension     7. Anxiety           70 y.o. male with planned surgery as above. Plan:     Patient medically cleared for above planned procedure. He has bronchitis. I will treat with Zpak and Pro air. I changed him to Lexapro.

## 2019-04-19 ENCOUNTER — APPOINTMENT (OUTPATIENT)
Dept: GENERAL RADIOLOGY | Age: 69
End: 2019-04-19
Payer: COMMERCIAL

## 2019-04-19 ENCOUNTER — HOSPITAL ENCOUNTER (EMERGENCY)
Age: 69
Discharge: ANOTHER HOSPITAL | End: 2019-04-19
Attending: EMERGENCY MEDICINE
Payer: COMMERCIAL

## 2019-04-19 ENCOUNTER — HOSPITAL ENCOUNTER (INPATIENT)
Age: 69
LOS: 1 days | Discharge: HOME | DRG: 244 | End: 2019-04-20
Attending: INTERNAL MEDICINE | Admitting: INTERNAL MEDICINE
Payer: COMMERCIAL

## 2019-04-19 VITALS
OXYGEN SATURATION: 93 % | RESPIRATION RATE: 20 BRPM | SYSTOLIC BLOOD PRESSURE: 99 MMHG | WEIGHT: 220 LBS | DIASTOLIC BLOOD PRESSURE: 60 MMHG | TEMPERATURE: 98.4 F | BODY MASS INDEX: 35.36 KG/M2 | HEART RATE: 42 BPM | HEIGHT: 66 IN

## 2019-04-19 DIAGNOSIS — I44.2 COMPLETE HEART BLOCK (HCC): ICD-10-CM

## 2019-04-19 DIAGNOSIS — R00.1 SYMPTOMATIC BRADYCARDIA: Primary | ICD-10-CM

## 2019-04-19 PROBLEM — I45.9 HEART BLOCK: Status: ACTIVE | Noted: 2019-04-19

## 2019-04-19 LAB
A/G RATIO: 1.2 (ref 1.1–2.2)
ALBUMIN SERPL-MCNC: 4.1 G/DL (ref 3.4–5)
ALP BLD-CCNC: 57 U/L (ref 40–129)
ALT SERPL-CCNC: 20 U/L (ref 10–40)
ANION GAP SERPL CALCULATED.3IONS-SCNC: 13 MMOL/L (ref 3–16)
AST SERPL-CCNC: 19 U/L (ref 15–37)
BASOPHILS ABSOLUTE: 0.1 K/UL (ref 0–0.2)
BASOPHILS RELATIVE PERCENT: 0.8 %
BILIRUB SERPL-MCNC: 0.5 MG/DL (ref 0–1)
BUN BLDV-MCNC: 29 MG/DL (ref 7–20)
CALCIUM SERPL-MCNC: 9.1 MG/DL (ref 8.3–10.6)
CHLORIDE BLD-SCNC: 103 MMOL/L (ref 99–110)
CO2: 25 MMOL/L (ref 21–32)
CREAT SERPL-MCNC: 1.2 MG/DL (ref 0.8–1.3)
EKG ATRIAL RATE: 40 BPM
EKG ATRIAL RATE: 42 BPM
EKG DIAGNOSIS: NORMAL
EKG DIAGNOSIS: NORMAL
EKG P AXIS: 53 DEGREES
EKG P-R INTERVAL: 252 MS
EKG Q-T INTERVAL: 584 MS
EKG Q-T INTERVAL: 606 MS
EKG QRS DURATION: 132 MS
EKG QRS DURATION: 142 MS
EKG QTC CALCULATION (BAZETT): 475 MS
EKG QTC CALCULATION (BAZETT): 506 MS
EKG R AXIS: -12 DEGREES
EKG R AXIS: -52 DEGREES
EKG T AXIS: -7 DEGREES
EKG T AXIS: 65 DEGREES
EKG VENTRICULAR RATE: 40 BPM
EKG VENTRICULAR RATE: 42 BPM
EOSINOPHILS ABSOLUTE: 0.2 K/UL (ref 0–0.6)
EOSINOPHILS RELATIVE PERCENT: 2.5 %
GFR AFRICAN AMERICAN: >60
GFR NON-AFRICAN AMERICAN: 60
GLOBULIN: 3.3 G/DL
GLUCOSE BLD-MCNC: 127 MG/DL (ref 70–99)
HCT VFR BLD CALC: 42.4 % (ref 40.5–52.5)
HEMOGLOBIN: 14.6 G/DL (ref 13.5–17.5)
LYMPHOCYTES ABSOLUTE: 1.9 K/UL (ref 1–5.1)
LYMPHOCYTES RELATIVE PERCENT: 19.8 %
MAGNESIUM: 2.3 MG/DL (ref 1.8–2.4)
MCH RBC QN AUTO: 30.8 PG (ref 26–34)
MCHC RBC AUTO-ENTMCNC: 34.4 G/DL (ref 31–36)
MCV RBC AUTO: 89.7 FL (ref 80–100)
MONOCYTES ABSOLUTE: 0.9 K/UL (ref 0–1.3)
MONOCYTES RELATIVE PERCENT: 9.6 %
NEUTROPHILS ABSOLUTE: 6.4 K/UL (ref 1.7–7.7)
NEUTROPHILS RELATIVE PERCENT: 67.3 %
PDW BLD-RTO: 13.6 % (ref 12.4–15.4)
PLATELET # BLD: 195 K/UL (ref 135–450)
PMV BLD AUTO: 8.5 FL (ref 5–10.5)
POTASSIUM SERPL-SCNC: 4 MMOL/L (ref 3.5–5.1)
PRO-BNP: 301 PG/ML (ref 0–124)
RBC # BLD: 4.73 M/UL (ref 4.2–5.9)
SODIUM BLD-SCNC: 141 MMOL/L (ref 136–145)
TOTAL PROTEIN: 7.4 G/DL (ref 6.4–8.2)
TROPONIN: <0.01 NG/ML
TSH REFLEX: 2.88 UIU/ML (ref 0.27–4.2)
WBC # BLD: 9.5 K/UL (ref 4–11)

## 2019-04-19 PROCEDURE — 99223 1ST HOSP IP/OBS HIGH 75: CPT | Performed by: INTERNAL MEDICINE

## 2019-04-19 PROCEDURE — 6360000002 HC RX W HCPCS: Performed by: INTERNAL MEDICINE

## 2019-04-19 PROCEDURE — 93010 ELECTROCARDIOGRAM REPORT: CPT | Performed by: INTERNAL MEDICINE

## 2019-04-19 PROCEDURE — 85025 COMPLETE CBC W/AUTO DIFF WBC: CPT

## 2019-04-19 PROCEDURE — 83735 ASSAY OF MAGNESIUM: CPT

## 2019-04-19 PROCEDURE — 2580000003 HC RX 258: Performed by: INTERNAL MEDICINE

## 2019-04-19 PROCEDURE — C1894 INTRO/SHEATH, NON-LASER: HCPCS

## 2019-04-19 PROCEDURE — 2060000000 HC ICU INTERMEDIATE R&B

## 2019-04-19 PROCEDURE — 96376 TX/PRO/DX INJ SAME DRUG ADON: CPT

## 2019-04-19 PROCEDURE — 02H63JZ INSERTION OF PACEMAKER LEAD INTO RIGHT ATRIUM, PERCUTANEOUS APPROACH: ICD-10-PCS | Performed by: INTERNAL MEDICINE

## 2019-04-19 PROCEDURE — 99284 EMERGENCY DEPT VISIT MOD MDM: CPT

## 2019-04-19 PROCEDURE — 96374 THER/PROPH/DIAG INJ IV PUSH: CPT

## 2019-04-19 PROCEDURE — 6360000002 HC RX W HCPCS

## 2019-04-19 PROCEDURE — 2580000003 HC RX 258

## 2019-04-19 PROCEDURE — 0JH606Z INSERTION OF PACEMAKER, DUAL CHAMBER INTO CHEST SUBCUTANEOUS TISSUE AND FASCIA, OPEN APPROACH: ICD-10-PCS | Performed by: INTERNAL MEDICINE

## 2019-04-19 PROCEDURE — 2700000000 HC OXYGEN THERAPY PER DAY

## 2019-04-19 PROCEDURE — 94761 N-INVAS EAR/PLS OXIMETRY MLT: CPT

## 2019-04-19 PROCEDURE — 93005 ELECTROCARDIOGRAM TRACING: CPT | Performed by: EMERGENCY MEDICINE

## 2019-04-19 PROCEDURE — 84484 ASSAY OF TROPONIN QUANT: CPT

## 2019-04-19 PROCEDURE — 02HK3JZ INSERTION OF PACEMAKER LEAD INTO RIGHT VENTRICLE, PERCUTANEOUS APPROACH: ICD-10-PCS | Performed by: INTERNAL MEDICINE

## 2019-04-19 PROCEDURE — 6370000000 HC RX 637 (ALT 250 FOR IP): Performed by: NURSE PRACTITIONER

## 2019-04-19 PROCEDURE — 6360000002 HC RX W HCPCS: Performed by: EMERGENCY MEDICINE

## 2019-04-19 PROCEDURE — 84443 ASSAY THYROID STIM HORMONE: CPT

## 2019-04-19 PROCEDURE — 36415 COLL VENOUS BLD VENIPUNCTURE: CPT

## 2019-04-19 PROCEDURE — 6370000000 HC RX 637 (ALT 250 FOR IP): Performed by: INTERNAL MEDICINE

## 2019-04-19 PROCEDURE — C1785 PMKR, DUAL, RATE-RESP: HCPCS

## 2019-04-19 PROCEDURE — 71045 X-RAY EXAM CHEST 1 VIEW: CPT

## 2019-04-19 PROCEDURE — 83880 ASSAY OF NATRIURETIC PEPTIDE: CPT

## 2019-04-19 PROCEDURE — 33208 INSRT HEART PM ATRIAL & VENT: CPT

## 2019-04-19 PROCEDURE — 33208 INSRT HEART PM ATRIAL & VENT: CPT | Performed by: INTERNAL MEDICINE

## 2019-04-19 PROCEDURE — 2580000003 HC RX 258: Performed by: NURSE PRACTITIONER

## 2019-04-19 PROCEDURE — 2500000003 HC RX 250 WO HCPCS

## 2019-04-19 PROCEDURE — 93005 ELECTROCARDIOGRAM TRACING: CPT | Performed by: INTERNAL MEDICINE

## 2019-04-19 PROCEDURE — 80053 COMPREHEN METABOLIC PANEL: CPT

## 2019-04-19 PROCEDURE — 93005 ELECTROCARDIOGRAM TRACING: CPT | Performed by: NURSE PRACTITIONER

## 2019-04-19 PROCEDURE — C1898 LEAD, PMKR, OTHER THAN TRANS: HCPCS

## 2019-04-19 RX ORDER — FENTANYL CITRATE 50 UG/ML
INJECTION, SOLUTION INTRAMUSCULAR; INTRAVENOUS
Status: COMPLETED | OUTPATIENT
Start: 2019-04-19 | End: 2019-04-19

## 2019-04-19 RX ORDER — ATROPINE SULFATE 1 MG/ML
0.5 INJECTION, SOLUTION INTRAMUSCULAR; INTRAVENOUS; SUBCUTANEOUS ONCE
Status: COMPLETED | OUTPATIENT
Start: 2019-04-19 | End: 2019-04-19

## 2019-04-19 RX ORDER — MIDAZOLAM HYDROCHLORIDE 5 MG/ML
INJECTION INTRAMUSCULAR; INTRAVENOUS
Status: COMPLETED | OUTPATIENT
Start: 2019-04-19 | End: 2019-04-19

## 2019-04-19 RX ORDER — SODIUM CHLORIDE 0.9 % (FLUSH) 0.9 %
10 SYRINGE (ML) INJECTION PRN
Status: DISCONTINUED | OUTPATIENT
Start: 2019-04-19 | End: 2019-04-20 | Stop reason: HOSPADM

## 2019-04-19 RX ORDER — SODIUM CHLORIDE 9 MG/ML
INJECTION, SOLUTION INTRAVENOUS CONTINUOUS
Status: DISCONTINUED | OUTPATIENT
Start: 2019-04-19 | End: 2019-04-20 | Stop reason: HOSPADM

## 2019-04-19 RX ORDER — ATORVASTATIN CALCIUM 80 MG/1
80 TABLET, FILM COATED ORAL NIGHTLY
Status: DISCONTINUED | OUTPATIENT
Start: 2019-04-19 | End: 2019-04-20 | Stop reason: HOSPADM

## 2019-04-19 RX ORDER — ACETAMINOPHEN 325 MG/1
650 TABLET ORAL EVERY 4 HOURS PRN
Status: DISCONTINUED | OUTPATIENT
Start: 2019-04-19 | End: 2019-04-20 | Stop reason: HOSPADM

## 2019-04-19 RX ORDER — SODIUM CHLORIDE 0.9 % (FLUSH) 0.9 %
10 SYRINGE (ML) INJECTION EVERY 12 HOURS SCHEDULED
Status: DISCONTINUED | OUTPATIENT
Start: 2019-04-19 | End: 2019-04-20 | Stop reason: HOSPADM

## 2019-04-19 RX ORDER — ATROPINE SULFATE 0.1 MG/ML
0.5 INJECTION INTRAVENOUS ONCE
Status: COMPLETED | OUTPATIENT
Start: 2019-04-19 | End: 2019-04-19

## 2019-04-19 RX ORDER — ESCITALOPRAM OXALATE 10 MG/1
10 TABLET ORAL DAILY
Status: DISCONTINUED | OUTPATIENT
Start: 2019-04-19 | End: 2019-04-20 | Stop reason: HOSPADM

## 2019-04-19 RX ADMIN — FENTANYL CITRATE 50 MCG: 50 INJECTION, SOLUTION INTRAMUSCULAR; INTRAVENOUS at 14:22

## 2019-04-19 RX ADMIN — CEFAZOLIN 1 G: 1 INJECTION, POWDER, FOR SOLUTION INTRAMUSCULAR; INTRAVENOUS at 21:45

## 2019-04-19 RX ADMIN — ESCITALOPRAM OXALATE 10 MG: 10 TABLET ORAL at 13:09

## 2019-04-19 RX ADMIN — ATROPINE SULFATE 0.5 MG: 0.1 INJECTION PARENTERAL at 06:37

## 2019-04-19 RX ADMIN — MIDAZOLAM HYDROCHLORIDE 1 MG: 5 INJECTION INTRAMUSCULAR; INTRAVENOUS at 15:11

## 2019-04-19 RX ADMIN — MIDAZOLAM HYDROCHLORIDE 1 MG: 5 INJECTION INTRAMUSCULAR; INTRAVENOUS at 14:37

## 2019-04-19 RX ADMIN — FENTANYL CITRATE 25 MCG: 50 INJECTION, SOLUTION INTRAMUSCULAR; INTRAVENOUS at 14:37

## 2019-04-19 RX ADMIN — MIDAZOLAM HYDROCHLORIDE 2 MG: 5 INJECTION INTRAMUSCULAR; INTRAVENOUS at 14:22

## 2019-04-19 RX ADMIN — SODIUM CHLORIDE: 9 INJECTION, SOLUTION INTRAVENOUS at 11:00

## 2019-04-19 RX ADMIN — MIDAZOLAM HYDROCHLORIDE 1 MG: 5 INJECTION INTRAMUSCULAR; INTRAVENOUS at 15:26

## 2019-04-19 RX ADMIN — Medication 10 ML: at 10:45

## 2019-04-19 RX ADMIN — FENTANYL CITRATE 50 MCG: 50 INJECTION, SOLUTION INTRAMUSCULAR; INTRAVENOUS at 15:26

## 2019-04-19 RX ADMIN — FENTANYL CITRATE 25 MCG: 50 INJECTION, SOLUTION INTRAMUSCULAR; INTRAVENOUS at 15:11

## 2019-04-19 RX ADMIN — SODIUM CHLORIDE, PRESERVATIVE FREE 10 ML: 5 INJECTION INTRAVENOUS at 21:45

## 2019-04-19 RX ADMIN — ATROPINE SULFATE 0.5 MG: 1 INJECTION, SOLUTION INTRAMUSCULAR; INTRAVENOUS; SUBCUTANEOUS at 06:00

## 2019-04-19 RX ADMIN — ATORVASTATIN CALCIUM 80 MG: 80 TABLET, FILM COATED ORAL at 21:45

## 2019-04-19 RX ADMIN — SODIUM CHLORIDE: 9 INJECTION, SOLUTION INTRAVENOUS at 22:13

## 2019-04-19 RX ADMIN — MIDAZOLAM HYDROCHLORIDE 1 MG: 5 INJECTION INTRAMUSCULAR; INTRAVENOUS at 16:00

## 2019-04-19 ASSESSMENT — PAIN SCALES - GENERAL
PAINLEVEL_OUTOF10: 0
PAINLEVEL_OUTOF10: 0

## 2019-04-19 NOTE — ED PROVIDER NOTES
file     Active member of club or organization: Not on file     Attends meetings of clubs or organizations: Not on file     Relationship status: Not on file    Intimate partner violence:     Fear of current or ex partner: Not on file     Emotionally abused: Not on file     Physically abused: Not on file     Forced sexual activity: Not on file   Other Topics Concern    Not on file   Social History Narrative    Not on file       Nursing notes reviewed. ED Triage Vitals [04/19/19 0538]   Enc Vitals Group      BP (!) 162/74      Pulse (!) 43      Resp 15      Temp 98.4 °F (36.9 °C)      Temp Source Oral      SpO2 94 %      Weight 220 lb (99.8 kg)      Height 5' 6\" (1.676 m)      Head Circumference       Peak Flow       Pain Score       Pain Loc       Pain Edu? Excl. in [address]? GENERAL:  Awake, alert. Well developed, well nourished with no apparent distress. HENT:  Normocephalic, Atraumatic, moist mucous membranes. EYES:  Pupils equal round and reactive to light, Conjunctiva normal, extraocular movements normal.  NECK:  No meningeal signs, Supple. CHEST:  Regular rate and rhythm, chest wall non-tender. LUNGS:  Clear to auscultation bilaterally, no respiratory distress. ABDOMEN:  Soft, non-tender, non-distended, normal bowel sounds. No costovertebral angle tenderness to palpation. EXTREMITIES:  Normal range of motion, no edema, no tenderness, no deformity, distal pulses present. BACK:  No tenderness. SKIN: Warm, dry and intact. NEUROLOGIC: Normal mental status. Moving all extremities to command. LABS and DIAGNOSTIC RESULTS  EKG  The Ekg interpreted by me shows  sinus bradycardia, rate=42   Axis is   Normal  QTc is  normal  Intervals and Durations are unremarkable. ST Segments: no acute change  Delta waves, Brugada Syndrome, and Short OR are not present. RADIOLOGY  X-RAYS:  I have reviewed radiologic plain film image(s).   ALL OTHER NON-PLAIN FILM IMAGES SUCH AS CT, ULTRASOUND AND MRI HAVE BEEN READ BY THE RADIOLOGIST. XR CHEST PORTABLE   Final Result   Small bilateral pleural effusions suspected.               LABS  Results for orders placed or performed during the hospital encounter of 04/19/19   CBC auto differential   Result Value Ref Range    WBC 9.5 4.0 - 11.0 K/uL    RBC 4.73 4.20 - 5.90 M/uL    Hemoglobin 14.6 13.5 - 17.5 g/dL    Hematocrit 42.4 40.5 - 52.5 %    MCV 89.7 80.0 - 100.0 fL    MCH 30.8 26.0 - 34.0 pg    MCHC 34.4 31.0 - 36.0 g/dL    RDW 13.6 12.4 - 15.4 %    Platelets 078 468 - 013 K/uL    MPV 8.5 5.0 - 10.5 fL    Neutrophils % 67.3 %    Lymphocytes % 19.8 %    Monocytes % 9.6 %    Eosinophils % 2.5 %    Basophils % 0.8 %    Neutrophils # 6.4 1.7 - 7.7 K/uL    Lymphocytes # 1.9 1.0 - 5.1 K/uL    Monocytes # 0.9 0.0 - 1.3 K/uL    Eosinophils # 0.2 0.0 - 0.6 K/uL    Basophils # 0.1 0.0 - 0.2 K/uL   Comprehensive metabolic panel   Result Value Ref Range    Sodium 141 136 - 145 mmol/L    Potassium 4.0 3.5 - 5.1 mmol/L    Chloride 103 99 - 110 mmol/L    CO2 25 21 - 32 mmol/L    Anion Gap 13 3 - 16    Glucose 127 (H) 70 - 99 mg/dL    BUN 29 (H) 7 - 20 mg/dL    CREATININE 1.2 0.8 - 1.3 mg/dL    GFR Non-African American 60 (A) >60    GFR African American >60 >60    Calcium 9.1 8.3 - 10.6 mg/dL    Total Protein 7.4 6.4 - 8.2 g/dL    Alb 4.1 3.4 - 5.0 g/dL    Albumin/Globulin Ratio 1.2 1.1 - 2.2    Total Bilirubin 0.5 0.0 - 1.0 mg/dL    Alkaline Phosphatase 57 40 - 129 U/L    ALT 20 10 - 40 U/L    AST 19 15 - 37 U/L    Globulin 3.3 g/dL   Troponin   Result Value Ref Range    Troponin <0.01 <0.01 ng/mL   Magnesium   Result Value Ref Range    Magnesium 2.30 1.80 - 2.40 mg/dL   Brain Natriuretic Peptide   Result Value Ref Range    Pro- (H) 0 - 124 pg/mL   EKG 12 Lead   Result Value Ref Range    Ventricular Rate 42 BPM    Atrial Rate 42 BPM    P-R Interval 252 ms    QRS Duration 132 ms    Q-T Interval 606 ms    QTc Calculation (Bazett) 506 ms    P Axis 53 degrees    R Axis -52 degrees    T Axis 65 degrees    Diagnosis       Marked sinus bradycardia with 1st degree A-V blockLeft axis deviationNon-specific intra-ventricular conduction blockInferior infarct , age undeterminedAnterolateral infarct , age undeterminedAbnormal ECGNo previous ECGs available         PROCEDURES      MEDICAL DECISION MAKING  On arrival to the emergency department, patient bradycardic with a rate of 43 but otherwise normotensive. Patient afebrile. EKG obtained demonstrating what appeared to be sinus bradycardia but actually appears to be complete heart block with a rate of 42. No ST segment elevation/depression or T-wave inversion concerning for acute ischemia. Patient is placed on life bank due to his bradycardia. Patient is given a dose of atropine with only moderate improvement of pulse to the mid 40s. CBC, CMP obtained and showing no concerning acute abnormalities at this time. Creatinine 1.2. Troponin not elevated. Paranasal all normal.  . Patient has no history of congestive heart failure. Chest x-ray obtained demonstrating bilateral pleural effusions. Patient is given second dose of atropine with only moderate change in heart rate. Patient has remained otherwise hemodynamically stable. Patient will be transferred to [facility] for symptomatic bradycardia. As patient has been maintaining pressures and hemodynamically stable, will hold on transcutaneous pacing unless patient clinical status worsens. Final diagnoses:   Symptomatic bradycardia   Complete heart block ([address]         Patient was given scripts for the following medications. I counseled patient how to take these medications. New Prescriptions    No medications on file       Disposition  Pt is in stable condition upon Transfer to [facility].      This chart was generated using the [address] [place] [address] dictation system. I created this record but it may contain dictation errors.            Marcos Sheffield MD  04/19/19 823 Nerissa Marvin MD  04/19/19 4593

## 2019-04-19 NOTE — ED NOTES
3536- Page sent to Dr. Gregor Gonzalez per Dr. Emeterio Clancy to confirm that patient would be better placed at [facility]. Dr. Gregor Gonzalez confirmed. Consult sent to [facility] hospitalist at this time.       Bob Liu  04/19/19 3736

## 2019-04-19 NOTE — ED NOTES
Call from [facility] with bed assignment.     [facility] [phone]     Call report to Hortencia Chaidez  04/19/19 4735

## 2019-04-19 NOTE — PROGRESS NOTES
Pt back to his room cmu aware. Family at bedside. metronic here to educate, and will be back in the morning. Sling/swothe in place. poc reviewed with pt.

## 2019-04-19 NOTE — ED NOTES
Pt ambulatory to rr without difficulty. Pt denies dizziness , pain or sob. Will continue to monitor.      Gideon Ruvalcaba RN  04/19/19 5816

## 2019-04-19 NOTE — PROGRESS NOTES
Report received from jennifer nurse at [address] [place] [address]. Pt settled into room 443. Necessities given. Family at bedside.

## 2019-04-19 NOTE — PROGRESS NOTES
Consent and ticket to ride completed and on chart. Family at beside. Transport here to take pt to cath lab, cmu aware.

## 2019-04-19 NOTE — SEDATION DOCUMENTATION
Sedation start time: 1421  Sedation stop time: 1645  Mallampati: 3  Pre and post Umberto score: 10/10  Medication given: IV Versed, IV fentanyl  Pre-Procedure Assessment / Plan:  ASA: Class 2 - A normal healthy patient with mild systemic disease  Level of Sedation Plan: Moderate sedation  Post Procedure plan: Return to same level of care

## 2019-04-19 NOTE — CONSULTS
[address] [address]                                  CONSULTATION    PATIENT NAME: Sage Elizalde                    :        1950  MED REC NO:   [id number]                          ROOM:       [id number]  ACCOUNT NO:   [de-identified]                           ADMIT DATE: 2019  PROVIDER:     Edilson Willoughby MD      CONSULT DATE:  2019      CARDIAC ELECTROPHYSIOLOGY CONSULTATION    REASON FOR CONSULTATION:  AV block. HISTORY OF PRESENT ILLNESS:  The patient is a 54-year-old man with a  history of cataracts and auditory deficit, who was admitted for  evaluation of bradycardia. The patient has an Apple watch and was  alerted on the morning of 2019 about a low heart rate. With some  activity, he noted that his heart rate would increase into the 60s to  70s, but he had some previous time of heart rates in the 40s. By  2019, the low heart rates were more consistent and the patient had  symptoms of weakness, dizziness, and lightheadedness. He presented to  the Emergency Department at [facility], where the initial  ECG demonstrated sinus rhythm with 2:1 AV block and left bundle-branch  block with left axis deviation. Subsequent ECG from 2019  demonstrates sinus rhythm with primarily complete heart block with a  right bundle-branch block appearing escape rhythm. There is some  conduction of atrial activity, which is generally conductive of left  bundle-branch block aberration. There were no acid base or electrolyte  abnormalities that might provide an explanation for the AV block. He is  currently on no medications which might be expected to retard AV  conduction. Echocardiography from 2017 demonstrates well preserved  left ventricular function. PAST MEDICAL HISTORY:  1. Hypertension. 2.  Hyperlipidemia. 3.  Benign prostatic hypertrophy.     MEDICATIONS:  At the time of admission include azithromycin 250 mg p.o.  q.d., Lexapro 10 mg p.o. q.d., Pravachol 80 mg p.o. q.h.s., ___ 80 mg  p.o. q.d., and hydrochlorothiazide 25 mg p.o. q.d. ALLERGIES:  None known. SOCIAL HISTORY:  The patient does not smoke cigarettes. He does consume  alcohol on an occasional basis. FAMILY HISTORY:  Remarkable for cancer in the father and brother. There  is no known family history of structural heart disease, cardiac rhythm  disturbance, syncope, or sudden cardiac death. REVIEW OF SYSTEMS:  Demonstrates no recent change in appetite or change  in weight. The patient has not had any recent fever or chills. He does  not describe palpitations. He has not had near syncope or syncope prior  to admission. His functional status has generally been well preserved. All other components of the 10-system reviewed are negative. PHYSICAL EXAMINATION:  VITAL SIGNS:  Blood pressure is 114/64, heart rate is 40 beats per  minute and regular. The patient is awake, alert, and oriented and in no  discomfort. HEENT:  Exam demonstrates normocephalic and atraumatic head. There is  no scleral icterus. Pupils are round and reactive. NECK:  Supple without thyromegaly. LUNGS:  Clear to auscultation. CARDIOVASCULAR:  Exam reveals a regular rhythm. The patient is  bradycardia. S1 and S2 are normal.  There is no audible murmur or  gallop. The jugular venous pressure is difficult to assess in the  supine position. ABDOMEN:  Very obese, soft, and nontender. EXTREMITIES:  Demonstrate no pitting pretibial dependent edema. There  is no cyanosis. There is no evidence for chronic venous stasis. SKIN:  Otherwise, warm and dry without skin rash. DIAGNOSTIC DATA:  A 12-lead ECG from 04/19/2019 demonstrates sinus  rhythm at 75 beats per minute with primarily complete heart block. There are two conductive beats, which have left bundle-branch block  morphology.   The escape rhythm has right bundle-branch

## 2019-04-19 NOTE — H&P
Hospital Medicine History & Physical      PCP: Estefania Benitez MD    Date of Admission: 4/19/2019    Date of Service: Pt seen/examined on 4/19/19 and Admitted to Inpatient with expected LOS greater than two midnights due to medical therapy. Chief Complaint: dizziness and low heart rate       History Of Present Illness:      70 y.o. male who presented to [facility] initially with complaints that his \"apple watch was alarming\"  with low hear rate and he had associated anxiety and dizziness for the last 48 hours. Recently he saw PCP for URI symptoms and was started on Azithromycin on Tuesday. He has no cardiac history and has never had an issues like this in the past. He currently has no fever, chills, nausea or vomiting, no chest pain or palpations. ED course: ECG found sinus marsha rate 40,  First degree block. CBC and BMP unremarkable. He was given 1 dose of atropine in the ED. He was transferred to [facility] for further cardiac evaluation. Past Medical History:          Diagnosis Date    Anxiety 12/21/2012    Hyperlipidemia     Hypertension     Hypertrophy of prostate without urinary obstruction     Mild mitral regurgitation 3/6/2018    Moderate aortic insufficiency 3/6/2018       Past Surgical History:          Procedure Laterality Date    COLONOSCOPY  4/21/2008    VITRECTOMY Left 2018       Medications Prior to Admission:      Prior to Admission medications    Medication Sig Start Date End Date Taking?  Authorizing Provider   telmisartan-hydrochlorothiazide (MICARDIS HCT) 80-25 MG per tablet TAKE ONE TABLET DAILY 4/16/19  Yes Zulma Rosenbaum MD   pravastatin (PRAVACHOL) 80 MG tablet TAKE ONE TABLET DAILY AT BEDTIME 4/16/19  Yes Zulma Rosenbaum MD   azithromycin (ZITHROMAX) 250 MG tablet Take 1 tablet by mouth See Admin Instructions for 5 days 500mg on day 1 followed by 250mg on days 2 - 5 4/16/19 4/21/19 Yes Zulma Rosenbaum MD   albuterol sulfate  (90 Base) MCG/ACT inhaler Inhale 2 puffs into the lungs 4 times daily as needed for Wheezing 4/16/19  Yes Britney Wilkerson MD   escitalopram (LEXAPRO) 10 MG tablet Take 1 tablet by mouth daily 4/16/19  Yes Britney Wilkerson MD       Allergies:  Patient has no known allergies. Social History:      The patient currently lives independently with spouse and works in construction. TOBACCO:   reports that he has never smoked. He has never used smokeless tobacco.  ETOH:   reports that he drinks alcohol. Family History:      Reviewed in detail and negative for DM, CAD, Cancer, CVA. Positive as follows:        Problem Relation Age of Onset    Cancer Brother 62        prostate cancer    Other Mother         Lung fibrosis    Cancer Father         Prostate cancer    No Known Problems Sister        REVIEW OF SYSTEMS:   Review of Systems   Constitutional: Negative. HENT: Positive for congestion. Eyes: Negative. Respiratory: Negative. Cardiovascular:        Low heart rate    Gastrointestinal: Negative. Endocrine: Negative. Genitourinary: Negative. Musculoskeletal: Negative. Skin: Negative. Allergic/Immunologic: Negative. Neurological: Positive for dizziness. Hematological: Negative. Psychiatric/Behavioral:        Anxious/uneasy feeling     PHYSICAL EXAM PERFORMED:    BP (!) 110/59   Pulse (!) 41   Temp 98.7 °F (37.1 °C)   Resp 16   SpO2 93%     General appearance:  No apparent distress, appears stated age and cooperative. HEENT:  Normal cephalic, atraumatic without obvious deformity. Pupils equal, round, and reactive to light. Extra ocular muscles intact. Conjunctivae/corneas clear. [place]   Neck: Supple, with full range of motion. No jugular venous distention. Trachea midline. Respiratory:  Normal respiratory effort. Clear to auscultation, bilaterally without Rales/Wheezes/Rhonchi.   Cardiovascular: slow irregular   Abdomen: Soft, non-tender, non-distended with normal bowel sounds. Musculoskeletal:  No clubbing, cyanosis or edema bilaterally. Full range of motion without deformity. Skin: Skin color, texture, turgor normal.  No rashes or lesions. Neurologic:  Neurovascularly intact without any focal sensory/motor deficits. Cranial nerves: II-XII intact, grossly non-focal.  Psychiatric:  Alert and oriented, thought content appropriate, normal insight  Capillary Refill: Brisk,< 3 seconds   Peripheral Pulses: +2 palpable, equal bilaterally       Labs:     Recent Labs     04/19/19  0555   WBC 9.5   HGB 14.6   HCT 42.4        Recent Labs     04/19/19  0555      K 4.0      CO2 25   BUN 29*   CREATININE 1.2   CALCIUM 9.1     Recent Labs     04/19/19  0555   AST 19   ALT 20   BILITOT 0.5   ALKPHOS 57     No results for input(s): INR in the last 72 hours. Recent Labs     04/19/19  0555   TROPONINI <0.01       Urinalysis:      Lab Results   Component Value Date    BLOODU neg 05/05/2017    GLUCOSEU neg 05/05/2017       Radiology:     EKG:     No orders to display       ASSESSMENT:    ELIZABETH/Lena Okeefe 1106 Problems    Diagnosis Date Noted    Heart block [I45.9] 04/19/2019    Moderate aortic insufficiency [I35.1] 03/06/2018    Anxiety [F41.9] 12/21/2012    Hypertension [I10]     Hyperlipidemia [E78.5]          PLAN:    Dizziness and Bradycardia - suspect complete heart block on monitor review   -ECG from ED, with marked bradycardia. 1st degree block - rate 42  -tele monitor, place pacer pads. STAT repeat ECG, ECHO   - labs reviewed elytes WNL, continue to monitor.  Add TSH   - Cardiology consult - placed call to Dr Parmar Heading   - IVF for hydration   - hold BP meds   - review was not on an BB, will stop azithromycin had 3/5 days of Z- Pack     HTN- controlled  - hold meds- home meds was telmisartan and HCTZ     HLD- controlled   - continue statin     Anxiety and depression   - continue lexapro     Moderate Aortic insufficiency   - Noted on ECHO in 2017  - repeating ECHO DVT Prophylaxis: lovenox   Diet: Diet NPO Effective Now  Code Status: Full Code    PT/OT Eval Status: NA     Dispo - await Cardiology input        José Hernandez, APRN - CNP    Thank you Giovany Alamo MD for the opportunity to be involved in this patient's care. If you have any questions or concerns please feel free to contact me at [phone].

## 2019-04-20 ENCOUNTER — APPOINTMENT (OUTPATIENT)
Dept: GENERAL RADIOLOGY | Age: 69
DRG: 244 | End: 2019-04-20
Attending: INTERNAL MEDICINE
Payer: COMMERCIAL

## 2019-04-20 VITALS
OXYGEN SATURATION: 94 % | TEMPERATURE: 98 F | DIASTOLIC BLOOD PRESSURE: 69 MMHG | HEIGHT: 66 IN | WEIGHT: 223 LBS | SYSTOLIC BLOOD PRESSURE: 122 MMHG | RESPIRATION RATE: 16 BRPM | BODY MASS INDEX: 35.84 KG/M2 | HEART RATE: 68 BPM

## 2019-04-20 LAB
ANION GAP SERPL CALCULATED.3IONS-SCNC: 10 MMOL/L (ref 3–16)
BUN BLDV-MCNC: 24 MG/DL (ref 7–20)
CALCIUM SERPL-MCNC: 8.4 MG/DL (ref 8.3–10.6)
CHLORIDE BLD-SCNC: 107 MMOL/L (ref 99–110)
CO2: 26 MMOL/L (ref 21–32)
CREAT SERPL-MCNC: 1 MG/DL (ref 0.8–1.3)
EKG ATRIAL RATE: 70 BPM
EKG DIAGNOSIS: NORMAL
EKG P AXIS: 39 DEGREES
EKG P-R INTERVAL: 128 MS
EKG Q-T INTERVAL: 494 MS
EKG QRS DURATION: 142 MS
EKG QTC CALCULATION (BAZETT): 533 MS
EKG R AXIS: 16 DEGREES
EKG T AXIS: 250 DEGREES
EKG VENTRICULAR RATE: 70 BPM
GFR AFRICAN AMERICAN: >60
GFR NON-AFRICAN AMERICAN: >60
GLUCOSE BLD-MCNC: 103 MG/DL (ref 70–99)
LV EF: 58 %
LVEF MODALITY: NORMAL
POTASSIUM REFLEX MAGNESIUM: 4 MMOL/L (ref 3.5–5.1)
SODIUM BLD-SCNC: 143 MMOL/L (ref 136–145)

## 2019-04-20 PROCEDURE — 93010 ELECTROCARDIOGRAM REPORT: CPT | Performed by: INTERNAL MEDICINE

## 2019-04-20 PROCEDURE — 2580000003 HC RX 258: Performed by: INTERNAL MEDICINE

## 2019-04-20 PROCEDURE — C8929 TTE W OR WO FOL WCON,DOPPLER: HCPCS

## 2019-04-20 PROCEDURE — 99232 SBSQ HOSP IP/OBS MODERATE 35: CPT | Performed by: NURSE PRACTITIONER

## 2019-04-20 PROCEDURE — 80048 BASIC METABOLIC PNL TOTAL CA: CPT

## 2019-04-20 PROCEDURE — 6360000002 HC RX W HCPCS: Performed by: INTERNAL MEDICINE

## 2019-04-20 PROCEDURE — 6370000000 HC RX 637 (ALT 250 FOR IP): Performed by: INTERNAL MEDICINE

## 2019-04-20 PROCEDURE — 71046 X-RAY EXAM CHEST 2 VIEWS: CPT

## 2019-04-20 PROCEDURE — 6360000004 HC RX CONTRAST MEDICATION: Performed by: INTERNAL MEDICINE

## 2019-04-20 PROCEDURE — 36415 COLL VENOUS BLD VENIPUNCTURE: CPT

## 2019-04-20 RX ADMIN — SODIUM CHLORIDE, PRESERVATIVE FREE 10 ML: 5 INJECTION INTRAVENOUS at 09:40

## 2019-04-20 RX ADMIN — CEFAZOLIN 1 G: 1 INJECTION, POWDER, FOR SOLUTION INTRAMUSCULAR; INTRAVENOUS at 06:03

## 2019-04-20 RX ADMIN — PERFLUTREN 1.65 MG: 6.52 INJECTION, SUSPENSION INTRAVENOUS at 08:38

## 2019-04-20 RX ADMIN — ESCITALOPRAM OXALATE 10 MG: 10 TABLET ORAL at 09:40

## 2019-04-20 ASSESSMENT — ENCOUNTER SYMPTOMS
GASTROINTESTINAL NEGATIVE: 1
RESPIRATORY NEGATIVE: 1

## 2019-04-20 NOTE — PROGRESS NOTES
Pt discharged home per order. IV removed, no new scripts given, meds reviewed. Pt fully understand d/c instructions. Pt has left via wheelchair with spouse and all personal belongings.

## 2019-04-20 NOTE — FLOWSHEET NOTE
04/20/19 0758   Assessment   Charting Type Shift assessment   Neurological   Neuro (WDL) WDL   Level of Consciousness 0   HEENT   HEENT (WDL) WDL   Respiratory   Respiratory (WDL) X   R Breath Sounds Clear   L Breath Sounds Clear   Respiratory Quality/Effort Unlabored   Respiratory Pattern Regular   Respiratory Depth Normal   Chest Assessment Chest expansion symmetrical   Cardiac   Cardiac (WDL) X   Cardiac Rhythm Atrial Paced   Cardiac Regularity Regular   Cardiac Monitor   Telemetry Monitor On Yes   Telemetry Audible Yes   Telemetry Alarms Set Yes   Pacemaker   Pacemaker Yes   Pacemaker Type Permanent   Gastrointestinal   Abdominal (WDL) WDL   Peripheral Vascular   Peripheral Vascular (WDL) WDL   Edema None   Skin Color/Condition   Skin Color/Condition (WDL) WDL   Skin Integrity   Skin Integrity (WDL) X  (Post pacemaker placement )   Location left chest wall   Musculoskeletal   Musculoskeletal (WDL) WDL   Genitourinary   Genitourinary (WDL) WDL   Anus/Rectum   Anus/Rectum (WDL) WDL

## 2019-04-20 NOTE — PROCEDURES
[facility]                            CARDIAC CATHETERIZATION    PATIENT NAME: Néstor Phillips                    :        1950  MED REC NO:   [id number]                          ROOM:       [id number]  ACCOUNT NO:   [de-identified]                           ADMIT DATE: 2019  PROVIDER:     Blank Causey MD    DATE OF PROCEDURE:  2019    CARDIAC ELECTROPHYSIOLOGY PROCEDURE NOTE    PROCEDURE:  Implantation of dual-chamber pacemaker with His bundle  electrode. INDICATION:  Complete heart block with symptomatic bradycardia. FINDINGS:  1. Right atrial sensing 3.2 millivolts. 2.  Right atrial pacing threshold 1 volt at 0.5 milliseconds. 3.  Right ventricular sensing 2.0 millivolts. 4.  Right ventricular pacing threshold 0.7 volts at 0.5 milliseconds. PROCEDURE DESCRIPTION:  After informed consent was obtained, the patient  was brought to the Cardiac Electrophysiology Laboratory in a fasting  state on 2019. He was prepared for the procedure by application  of ECG electrodes and an automated blood pressure cuff. Pacing and  defibrillation patches were applied to the chest in the  anterior-posterior orientation. The left upper chest was shaved and  prepared with antibacterial soap and the patient was draped in a sterile  fashion. He received IV midazolam and IV fentanyl for sedation. He was  received IV antibiotics prior to the onset of procedure. After adequate  sedation was achieved, a local anesthetic was infiltrated into the skin  underlying the lateral aspect of the left clavicle. After adequate  local anesthesia was achieved, a 4 cm incision was made beneath the  lateral aspect of the left clavicle. This incision was carried down to  the level of the prepectoral fascia using local anesthetic and  electrocautery. Hemostasis was achieved with electrocautery.   The left  axillary vein was visualized under ultrasound guidance and cannulated  with a thin-walled 18-gauge needle through which a J-tipped guidewire  was passed. A second guidewire was placed in a similar fashion. The  guidewires were observed to advance the inferior vena cava under  fluoroscopic guidance. A 7-Armenian hemostatic introducer was advanced  over the medial guidewire. The guidewire and dilator were removed. A 3  x 15 His bundle sheath was advanced over the wire to the tricuspid valve  annulus. The guidewire and dilator were removed. The right ventricular  pacing lead was then advanced through the sheath and used for mapping  purposes. This lead is StreamStar, model [id number], serial number [id number]. The anterior septal aspect was explored with the catheter using  electrogram recordings, which were gained appropriately. His bundle  recording was identified. The lead was actively fixed at that site. Pacing at that site was excellent with a narrow QRS. Ventricular  sensing; however, was poor. The lead was removed from that site and  removed from the sheath. The helix was freed from connective tissue. The lead was then introduced again through the sheath and a slightly  more ventricular site was identified. His bundle recovering was evident  at this site as well. The lead was actively fixed at size. The R-wave  sensing was relatively poor at 2 millivolts. The ventricular pacing  threshold was 0.7 volts at 0.5 milliseconds. Pacing impedances is 598  ohms. Pacing at that side, demonstrated a narrow QRS complex with QRS  duration of 103 milliseconds. There was a slight delta wave suggesting  non-selective His bundle pacing. The lead was felt to be in a stable  position at that site. The sheath was withdrawn and peeled away. A  7-Armenian introducer was advanced over the remaining guidewire. The  guidewire and dilator were removed.   The atrial lead was introduced into  the left axillary vein and advanced to the atrium under fluoroscopic  guidance. The atrial lead is Medtronic, model [id number], serial number  [id number]. This was advanced to the high anterior right atrium and  actively fixed at that site. The P-wave was sensed to 3.2 millivolts. The atrial pacing threshold is 1.0 volt at 0.5 milliseconds. Pacing  impedance was 647 ohms. Both leads were fixed to the underlying  prepectoral fascia using 2-0 silk suture. There were significant  backbleeding related to venous hypertension. A purse-string suture was  placed around the venous puncture sites using 3-0 Vicryl, which was  ligated loosely. This did seem to minimize the backbleeding. The leads  were fixed to the underlying pectoralis fascia using 2-0 silk sutures. The subcutaneous pocket was irrigated copiously with a solution of  cefazolin. Hemostasis was checked and found to be adequate. The leads  were connected to the pulse generator. The pulse generator is  MedFiberSensing, model [id number], serial number [id number]. The atrial and  ventricular leads were inserted into the appropriate receptacles and  pulse generator header and setscrews were tightened appropriately. The  device was implanted in the subcutaneous pocket and fixed and placed  with a single 2.0 silk. The subcutaneous tissues were closed in an  interrupted fashion using 3-0 Vicryl suture. Skin was closed in  subcuticular fashion using 4-0 Vicryl sutures. Wound edges were  approximated with Steri-Strips and the wound was covered with dry  sterile dressing. The patient tolerated the procedure well. There were no apparent  complications. All sponge and needle counts were reported as correct at  the termination of the case.         Anupama Najera MD    D: 04/19/2019 17:21:36       T: 04/19/2019 19:27:21     [email or web address]  Job#: [id number]     Doc#: [id number]    CC:

## 2019-04-20 NOTE — DISCHARGE SUMMARY
Hospital Medicine Discharge Summary    Patient ID: Ny Walton      Patient's PCP: Selene Urena MD    Admit Date: 4/19/2019     Discharge Date: 4/20/2019      Admitting Physician: Eleni Keller MD     Discharge Physician: Stephanie Byrd MD     Discharge Diagnoses: Active Hospital Problems    Diagnosis Date Noted    Heart block [I45.9] 04/19/2019    Moderate aortic insufficiency [I35.1] 03/06/2018    Anxiety [F41.9] 12/21/2012    Hypertension [I10]     Hyperlipidemia [E78.5]        The patient was seen and examined on day of discharge and this discharge summary is in conjunction with any daily progress note from day of discharge. Hospital Course:   70 y.o. male who presented to [facility] initially with complaints that his \"apple watch was alarming\"  with low hear rate and he had associated anxiety and dizziness for the last 48 hours. Recently he saw PCP for URI symptoms and was started on Azithromycin on Tuesday. He has no cardiac history and has never had an issues like this in the past. He currently has no fever, chills, nausea or vomiting, no chest pain or palpations. ED course: ECG found sinus marsha rate 40,  First degree block. CBC and BMP unremarkable. He was given 1 dose of atropine in the ED.      He was transferred to [facility] for further cardiac evaluation. Dizziness and Bradycardia - suspect complete heart block on monitor review   -ECG from ED, with marked bradycardia. 1st degree block - rate 42  - No cause found from labs or medications. - Echo largely stable from prior study, EF 55-60%. - Cardiology consult - placed call to Dr Kenia Rucker   - S/P pacer on 4/19. No pneumothorax on repeat CXR.   - paced rhythm with normal rate at time of discharge  - to follow up with cardiology as an outpatient.      HTN- controlled  - home meds restarted     HLD- controlled   - continue statin      Anxiety and depression   - continue lexapro      Moderate Aortic insufficiency   - Noted on ECHO in 2017  - not seen on repeat echo. Physical Exam Performed:     /69   Pulse 68   Temp 98 °F (36.7 °C) (Oral)   Resp 16   Ht 5' 6\" (1.676 m)   Wt 223 lb (101.2 kg)   SpO2 94%   BMI 35.99 kg/m²       General appearance:  No apparent distress, appears stated age and cooperative. HEENT:  Normal cephalic, atraumatic without obvious deformity. Pupils equal, round, and reactive to light. Extra ocular muscles intact. Conjunctivae/corneas clear. [place]   Neck: Supple, with full range of motion. No jugular venous distention. Trachea midline. Respiratory:  Normal respiratory effort. Clear to auscultation, bilaterally without Rales/Wheezes/Rhonchi. Cardiovascular: slow irregular   Abdomen: Soft, non-tender, non-distended with normal bowel sounds. Musculoskeletal:  No clubbing, cyanosis or edema bilaterally. Full range of motion without deformity. Skin: Skin color, texture, turgor normal.  No rashes or lesions. Neurologic:  Neurovascularly intact without any focal sensory/motor deficits. Cranial nerves: II-XII intact, grossly non-focal.  Psychiatric:  Alert and oriented, thought content appropriate, normal insight  Capillary Refill: Brisk,< 3 seconds   Peripheral Pulses: +2 palpable, equal bilaterally       Labs: For convenience and continuity at follow-up the following most recent labs are provided:      CBC:    Lab Results   Component Value Date    WBC 9.5 04/19/2019    HGB 14.6 04/19/2019    HCT 42.4 04/19/2019     04/19/2019       Renal:    Lab Results   Component Value Date     04/20/2019    K 4.0 04/20/2019     04/20/2019    CO2 26 04/20/2019    BUN 24 04/20/2019    CREATININE 1.0 04/20/2019    CALCIUM 8.4 04/20/2019         Significant Diagnostic Studies    Radiology:   XR CHEST STANDARD (2 VW)   Final Result   No pneumothorax following placement of left-sided cardiac pacemaker. Mild-to-moderate right basilar atelectasis. Possible very small bilateral   pleural effusions. No evident abnormal vascular congestion. Consults:     IP CONSULT TO CARDIOLOGY    Disposition:  home     Condition at Discharge: Stable    Discharge Instructions/Follow-up:  Follow up with cardiology, PCP within 1-2 weeks    Code Status:  Full Code     Activity: activity as tolerated    Diet: regular diet      Discharge Medications:     Discharge Medication List as of 4/20/2019 10:25 AM           Details   telmisartan-hydrochlorothiazide (MICARDIS HCT) 80-25 MG per tablet TAKE ONE TABLET DAILY, Disp-90 tablet, R-3Normal      albuterol sulfate  (90 Base) MCG/ACT inhaler Inhale 2 puffs into the lungs 4 times daily as needed for Wheezing, Disp-1 Inhaler, R-0Normal      escitalopram (LEXAPRO) 10 MG tablet Take 1 tablet by mouth daily, Disp-30 tablet, R-2Normal      pravastatin (PRAVACHOL) 80 MG tablet TAKE ONE TABLET DAILY AT BEDTIME, Disp-90 tablet, R-3Normal             Time Spent on discharge is more than 30 minutes in the examination, evaluation, counseling and review of medications and discharge plan. Signed:    Dina Espinoza MD   4/20/2019      Thank you Dallas James MD for the opportunity to be involved in this patient's care. If you have any questions or concerns please feel free to contact me at [phone].

## 2019-04-20 NOTE — PROGRESS NOTES
[facility]  Cardiology  Progress Note    Admission date:  2019    Reason for follow up visit: CHB s/p ppm    HPI/CC: Tracy Lopez is a 70 y.o. male was admitted to [facility] 2019 for bradycardia and dizziness. Subsequent EKG showed SR with primarily complete heart block. Underwent dual chamber pacemaker placement 2019. Rhythm overnight has been paced. CXR this morning normal from a device standpoint. Subjective: Denies chest pain, shortness of breath, palpitations and dizziness. Vitals:  Blood pressure 122/69, pulse 68, temperature 98 °F (36.7 °C), temperature source Oral, resp. rate 16, height 5' 6\" (1.676 m), weight 223 lb (101.2 kg), SpO2 94 %.   Temp  Av.4 °F (36.9 °C)  Min: 98 °F (36.7 °C)  Max: 98.7 °F (37.1 °C)  Pulse  Av.5  Min: 40  Max: 84  BP  Min: 99/60  Max: 132/79  SpO2  Av.1 %  Min: 92 %  Max: 96 %  FiO2   Av %  Min: 2 %  Max: 2 %    24 hour I/O    Intake/Output Summary (Last 24 hours) at 2019 0859  Last data filed at 2019 0604  Gross per 24 hour   Intake 336 ml   Output 100 ml   Net 236 ml     Current Facility-Administered Medications   Medication Dose Route Frequency Provider Last Rate Last Dose    sodium chloride flush 0.9 % injection 10 mL  10 mL Intravenous 2 times per day Mau Arcos MD   10 mL at 19 1045    sodium chloride flush 0.9 % injection 10 mL  10 mL Intravenous PRN Mau Arcos MD        magnesium hydroxide (MILK OF MAGNESIA) 400 MG/5ML suspension 30 mL  30 mL Oral Daily PRN Mau Arcos MD        acetaminophen (TYLENOL) tablet 650 mg  650 mg Oral Q4H PRN Mau Arcos MD        0.9 % sodium chloride infusion   Intravenous Continuous Mau Arcos  mL/hr at 19 2213      escitalopram (LEXAPRO) tablet 10 mg  10 mg Oral Daily Mau Arcos MD   10 mg at 19 1309    atorvastatin (LIPITOR) tablet 80 mg  80 mg Oral Nightly Mau Arcos MD 80 mg at 04/19/19 2145    sodium chloride flush 0.9 % injection 10 mL  10 mL Intravenous 2 times per day Marcelo Jean-Baptiste MD   10 mL at 04/19/19 2145    sodium chloride flush 0.9 % injection 10 mL  10 mL Intravenous PRN Marcelo Jean-Baptiste MD        acetaminophen (TYLENOL) tablet 650 mg  650 mg Oral Q4H PRN Marcelo Jean-Baptiste MD        ceFAZolin (ANCEF) 1 g in dextrose 5 % 50 mL IVPB (mini-bag)  1 g Intravenous 4 times per day Samia Jacobs MD   Stopped at 04/20/19 4455     Review of Systems   Constitutional: Negative. Respiratory: Negative. Cardiovascular: Negative. Gastrointestinal: Negative. Neurological: Negative. Objective:     Telemetry monitor: Paced    Physical Exam:  Constitutional:  Comfortable and alert, NAD, appears stated age  Eyes: PERRL, sclera nonicteric  Neck:  Supple, no masses, no thyroidmegaly, no JVD  Skin:  Warm and dry; no rash or lesions; left chest site clean and dry, steri strips intact, no erythema, oozing or heat  Heart: Regular, normal apex, S1 and S2 normal, no M/G/R  Lungs:  Normal respiratory effort; clear; no wheezing/rhonchi/rales  Abdomen: soft, non tender, + bowel sounds  Extremities:  No edema or cyanosis; no clubbing  Neuro: alert and oriented, moves legs and arms equally, normal mood and affect    Data Reviewed:    EKG 4/19/2019 0538:  Marked sinus bradycardia with 1st degree AV block rate 42    EKG 4/19/2019 1132:  Complete heart block, idioventricular rhythm, RBBB    Echo 4/20/2019:  Normal left ventricular systolic function with ejection fraction of 55-60%.   The study is inadequate to exclude regional wall motion abnormalities.   Systolic pulmonic artery pressure (SPAP) is normal estimated at 24 mmHg   (Right atrial pressure of 3 mmHg).   Compared to previous study from 12- no changes noted. CXR 4/20/2019:  No pneumothorax following placement of left-sided cardiac pacemaker. Mild-to-moderate right basilar atelectasis.  Possible very small bilateral   pleural effusions.  No evident abnormal vascular congestion. Echo 12/20/2017:   Normal left ventricle systolic function with an estimated ejection fraction   of 55%.   No regional wall motion abnormalities are seen.   Diastolic filling parameters suggest normal diastolic filing pressure.   Mild mitral regurgitation.   Individual aortic valve leaflets are not clearly visualized, aortic valve   appears sclerotic.   Moderate aortic regurgitation.   Systolic pulmonary artery pressure (SPAP) is normal and estimated at 15 mmHg   (RA pressure of 3 mmHg). Lab Reviewed:     Renal Profile:  Lab Results   Component Value Date    CREATININE 1.0 04/20/2019    BUN 24 04/20/2019     04/20/2019    K 4.0 04/20/2019     04/20/2019    CO2 26 04/20/2019     CBC:    Lab Results   Component Value Date    WBC 9.5 04/19/2019    RBC 4.73 04/19/2019    HGB 14.6 04/19/2019    HCT 42.4 04/19/2019    MCV 89.7 04/19/2019    RDW 13.6 04/19/2019     04/19/2019     BNP:    Lab Results   Component Value Date    PROBNP 301 04/19/2019     Fasting Lipid Panel:    Lab Results   Component Value Date    CHOL 182 01/23/2019    HDL 49 01/23/2019    TRIG 121 01/23/2019     Cardiac Enzymes:  CK/MbTroponin  Lab Results   Component Value Date    TROPONINI <0.01 04/19/2019     PT/ INR No results found for: INR, PROTIME  PTT No results found for: PTT   Lab Results   Component Value Date    MG 2.30 04/19/2019      Lab Results   Component Value Date    TSH 2.52 12/20/2017     All labs and imaging reviewed today    Assessment:  Complete heart block and symptomatic bradycardia: stable, s/p dual chamber pacemaker 4/19/2019  HTN: controlled  HLD: sub optimal, on statin  Aortic regurgitation: not noted on echo today, moderate on echo 12/2017    Plan:   1. CXR normal from a device standpoint, device interrogation with no issues  2. Activity restrictions reviewed  3.  Follow up scheduled for the device clinic on

## 2019-04-21 DIAGNOSIS — Z95.0 PACEMAKER: Primary | ICD-10-CM

## 2019-04-22 ENCOUNTER — NURSE ONLY (OUTPATIENT)
Dept: CARDIOLOGY CLINIC | Age: 69
End: 2019-04-22

## 2019-04-22 ENCOUNTER — TELEPHONE (OUTPATIENT)
Dept: INTERNAL MEDICINE CLINIC | Age: 69
End: 2019-04-22

## 2019-04-24 ENCOUNTER — PROCEDURE VISIT (OUTPATIENT)
Dept: CARDIOLOGY CLINIC | Age: 69
End: 2019-04-24

## 2019-04-24 DIAGNOSIS — Z95.0 PACEMAKER: ICD-10-CM

## 2019-04-26 ENCOUNTER — PROCEDURE VISIT (OUTPATIENT)
Dept: CARDIOLOGY CLINIC | Age: 69
End: 2019-04-26
Payer: COMMERCIAL

## 2019-04-26 DIAGNOSIS — I45.9 HEART BLOCK: ICD-10-CM

## 2019-04-26 DIAGNOSIS — Z95.0 PACEMAKER: ICD-10-CM

## 2019-04-26 PROCEDURE — 93280 PM DEVICE PROGR EVAL DUAL: CPT | Performed by: INTERNAL MEDICINE

## 2019-04-28 NOTE — H&P
The H&P was reviewed, the patient was examined, and no change has occurred in the patient's condition since the H&P was completed.     Lashon Hicks MD, PhD, FACS

## 2019-04-28 NOTE — OP NOTE
Tracy Lopez    OPERATIVE NOTE    Preoperative Diagnosis: Cataract left eye    Postoperative Diagnosis: Cataract left eye     Procedure: Phacoemulsification with intraocular lens inplantation, left eye    Surgeon: Priyanka Brambila M.D., Ph.D. Anesthesia: MAC with topical    Complications: none    Specimens: none    Indications for procedure: The patient is a 70y.o. year old with decreased vision, glare and halos around lights, and trouble with activities of daily living. Examination revealed a visually significant cataract in the left eye. Risks, benefits, and alternatives to surgery were discussed with the patient and the patient elected to proceed with phacoemulsification with lens implantation. Details of the procedure: Following informed consent, the patient was taken to the operating room and placed in the supine position. The eye was prepped and draped in the usual sterile fashion using aseptic technique for cataract surgery. Following topical tetracaine drops a side port incision was made in the inferotemporal cornea. The eye was filled with Trypan blue followed by balanced salt solution. The eye was filled with 1% non-preserved lidocaine. The eye was filled with viscoelastic and a 2.2 mm keratome blade was used to make a 3-plane clear corneal incision in the superotemporal cornea. The cystitome was used to make a tear in the anterior capsule and a Utrata forceps was used to make a complete curvilinear capsulorrhexis. The lens was hydrodissected and freely rotated. Phacoemulsification was performed. Irrigation/aspiration was used to remove all cortical material from the capsular bag. The eye was filled with viscoelastic and a foldable posterior chamber intraocular lens was injected into the capsular bag. Irrigation/aspiration was used to remove all excess viscoelastic. The eye was pressurized and the wounds were check for leaks and none were found.   The patient had Betadine and

## 2019-04-29 NOTE — PROGRESS NOTES
PRE OP INSTRUCTION SHEET   1. Do not eat or drink anything after 12 midnight  prior to surgery. This includes no water, chewing gum or mints. 2. Take the following pills will a small sip of water (see MAR)                                        3. Aspirin, Ibuprofen, Advil, Naproxen, Vitamin E, fish oil and other Anti-inflammatory products should be stopped for 5 days before surgery or as directed by your physician. 4. Check with your Doctor regarding stopping Plavix, Coumadin, Lovenox, Fragmin or other blood thinners   5. Do not smoke, and do not drink any alcoholic beverages 24 hours prior to surgery. This includes NA Beer. 6. You may brush your teeth and gargle the morning of surgery. DO NOT SWALLOW WATER   7. You MUST make arrangements for a responsible adult to take you home after your surgery. You will not be allowed to leave alone or drive yourself home. It is strongly suggested someone stay with you the first 24 hrs. Your surgery will be cancelled if you do not have a ride home. 8. A parent/legal guardian must accompany a child scheduled for surgery and plan to stay at the hospital until the child is discharged. Please do not bring other children with you. 9. Please wear simple, loose fitting clothing to the hospital.  Equilla Ort not bring valuables (money, credit cards, checkbooks, etc.) Do not wear any makeup (including no eye makeup) or nail polish on your fingers or toes. 10. DO NOT wear any jewelry or piercings on day of surgery. All body piercing jewelry must be removed. 11. If you have dentures,glasses, or contacts they will be removed before going to the OR; we will provide you a container. 12. Please see your family doctor/and cardiologist for a history & physical and/or concerning medications. Bring any test results/reports from your physician's office. Have history and labs faxed to [phone].  Remember to bring Blood Bank bracelet on the day of surgery. 14. If you have a Living Will and Durable Power of  for Healthcare, please bring in a copy. 13. Notify your Surgeon if you develop any illness between now and surgery  time, cough, cold, fever, sore throat, nausea, vomiting, etc.  Please notify your surgeon if you experience dizziness, shortness of breath or blurred vision between now & the time of your surgery   16. DO NOT shave your operative site 96 hours prior to surgery. For face & neck surgery, men may use an electric razor 48 hours prior to surgery. 17. Shower with _x__Antibacterial soap (x_chlorhexidine for total joint  Pt's) shower two times before surgery.(the morning of and the night before. 18. To provide excellent care visitors will be limited to one in the room at any given time.   Please call pre admission testing if you any further questions [phone] or [phone]

## 2019-04-30 ENCOUNTER — ANESTHESIA EVENT (OUTPATIENT)
Dept: OPERATING ROOM | Age: 69
End: 2019-04-30
Payer: COMMERCIAL

## 2019-05-01 ENCOUNTER — ANESTHESIA (OUTPATIENT)
Dept: OPERATING ROOM | Age: 69
End: 2019-05-01
Payer: COMMERCIAL

## 2019-05-01 ENCOUNTER — HOSPITAL ENCOUNTER (OUTPATIENT)
Age: 69
Setting detail: OUTPATIENT SURGERY
Discharge: HOME | End: 2019-05-01
Attending: OPHTHALMOLOGY | Admitting: OPHTHALMOLOGY
Payer: COMMERCIAL

## 2019-05-01 VITALS — OXYGEN SATURATION: 99 % | DIASTOLIC BLOOD PRESSURE: 71 MMHG | SYSTOLIC BLOOD PRESSURE: 141 MMHG

## 2019-05-01 VITALS
HEIGHT: 66 IN | DIASTOLIC BLOOD PRESSURE: 70 MMHG | SYSTOLIC BLOOD PRESSURE: 132 MMHG | WEIGHT: 230 LBS | BODY MASS INDEX: 36.96 KG/M2 | OXYGEN SATURATION: 96 % | HEART RATE: 70 BPM | RESPIRATION RATE: 16 BRPM | TEMPERATURE: 97.6 F

## 2019-05-01 PROCEDURE — 6370000000 HC RX 637 (ALT 250 FOR IP): Performed by: OPHTHALMOLOGY

## 2019-05-01 PROCEDURE — 3700000000 HC ANESTHESIA ATTENDED CARE: Performed by: OPHTHALMOLOGY

## 2019-05-01 PROCEDURE — V2632 POST CHMBR INTRAOCULAR LENS: HCPCS | Performed by: OPHTHALMOLOGY

## 2019-05-01 PROCEDURE — 2709999900 HC NON-CHARGEABLE SUPPLY: Performed by: OPHTHALMOLOGY

## 2019-05-01 PROCEDURE — 6360000002 HC RX W HCPCS: Performed by: NURSE ANESTHETIST, CERTIFIED REGISTERED

## 2019-05-01 PROCEDURE — 7100000010 HC PHASE II RECOVERY - FIRST 15 MIN: Performed by: OPHTHALMOLOGY

## 2019-05-01 PROCEDURE — 3600000003 HC SURGERY LEVEL 3 BASE: Performed by: OPHTHALMOLOGY

## 2019-05-01 PROCEDURE — 2580000003 HC RX 258: Performed by: ANESTHESIOLOGY

## 2019-05-01 PROCEDURE — 7100000011 HC PHASE II RECOVERY - ADDTL 15 MIN: Performed by: OPHTHALMOLOGY

## 2019-05-01 PROCEDURE — 2500000003 HC RX 250 WO HCPCS: Performed by: OPHTHALMOLOGY

## 2019-05-01 PROCEDURE — 2500000003 HC RX 250 WO HCPCS: Performed by: ANESTHESIOLOGY

## 2019-05-01 DEVICE — ACRYSOF(R) IQ ASPHERIC IOL SP ACRYLIC FOLDABLELENS WULTRASERT(TM) DELIVERY SYSTEM UV WBLUE LIGHT FILTER. 13.0MM LENGTH 6.0MM ANTERIORASYMMETRIC BICONVEX OPTIC PLANAR HAPTICS.
Type: IMPLANTABLE DEVICE | Site: EYE | Status: FUNCTIONAL
Brand: ACRYSOF ULTRASERT

## 2019-05-01 RX ORDER — FENTANYL CITRATE 50 UG/ML
INJECTION, SOLUTION INTRAMUSCULAR; INTRAVENOUS PRN
Status: DISCONTINUED | OUTPATIENT
Start: 2019-05-01 | End: 2019-05-01 | Stop reason: SDUPTHER

## 2019-05-01 RX ORDER — PREDNISOLONE ACETATE 10 MG/ML
1 SUSPENSION/ DROPS OPHTHALMIC CONTINUOUS
Status: DISCONTINUED | OUTPATIENT
Start: 2019-05-01 | End: 2019-05-01 | Stop reason: HOSPADM

## 2019-05-01 RX ORDER — SODIUM CHLORIDE 0.9 % (FLUSH) 0.9 %
10 SYRINGE (ML) INJECTION EVERY 12 HOURS SCHEDULED
Status: DISCONTINUED | OUTPATIENT
Start: 2019-05-01 | End: 2019-05-01 | Stop reason: HOSPADM

## 2019-05-01 RX ORDER — TETRACAINE HYDROCHLORIDE 5 MG/ML
SOLUTION OPHTHALMIC PRN
Status: DISCONTINUED | OUTPATIENT
Start: 2019-05-01 | End: 2019-05-01 | Stop reason: ALTCHOICE

## 2019-05-01 RX ORDER — SODIUM CHLORIDE, SODIUM LACTATE, POTASSIUM CHLORIDE, CALCIUM CHLORIDE 600; 310; 30; 20 MG/100ML; MG/100ML; MG/100ML; MG/100ML
INJECTION, SOLUTION INTRAVENOUS CONTINUOUS
Status: DISCONTINUED | OUTPATIENT
Start: 2019-05-01 | End: 2019-05-01 | Stop reason: HOSPADM

## 2019-05-01 RX ORDER — LIDOCAINE HYDROCHLORIDE 10 MG/ML
1 INJECTION, SOLUTION EPIDURAL; INFILTRATION; INTRACAUDAL; PERINEURAL
Status: COMPLETED | OUTPATIENT
Start: 2019-05-01 | End: 2019-05-01

## 2019-05-01 RX ORDER — SODIUM CHLORIDE 0.9 % (FLUSH) 0.9 %
10 SYRINGE (ML) INJECTION PRN
Status: DISCONTINUED | OUTPATIENT
Start: 2019-05-01 | End: 2019-05-01 | Stop reason: HOSPADM

## 2019-05-01 RX ORDER — MOXIFLOXACIN 5 MG/ML
1 SOLUTION/ DROPS OPHTHALMIC CONTINUOUS
Status: DISCONTINUED | OUTPATIENT
Start: 2019-05-01 | End: 2019-05-01 | Stop reason: HOSPADM

## 2019-05-01 RX ORDER — MIDAZOLAM HYDROCHLORIDE 1 MG/ML
INJECTION INTRAMUSCULAR; INTRAVENOUS PRN
Status: DISCONTINUED | OUTPATIENT
Start: 2019-05-01 | End: 2019-05-01 | Stop reason: SDUPTHER

## 2019-05-01 RX ORDER — MOXIFLOXACIN 5 MG/ML
SOLUTION/ DROPS OPHTHALMIC PRN
Status: DISCONTINUED | OUTPATIENT
Start: 2019-05-01 | End: 2019-05-01 | Stop reason: ALTCHOICE

## 2019-05-01 RX ADMIN — Medication 0.3 ML: at 08:25

## 2019-05-01 RX ADMIN — LIDOCAINE HYDROCHLORIDE 1 ML: 10 INJECTION, SOLUTION EPIDURAL; INFILTRATION; INTRACAUDAL; PERINEURAL at 08:35

## 2019-05-01 RX ADMIN — FENTANYL CITRATE 25 MCG: 50 INJECTION INTRAMUSCULAR; INTRAVENOUS at 09:28

## 2019-05-01 RX ADMIN — SODIUM CHLORIDE, POTASSIUM CHLORIDE, SODIUM LACTATE AND CALCIUM CHLORIDE: 600; 310; 30; 20 INJECTION, SOLUTION INTRAVENOUS at 08:35

## 2019-05-01 RX ADMIN — Medication 0.3 ML: at 08:36

## 2019-05-01 RX ADMIN — MIDAZOLAM 2 MG: 1 INJECTION INTRAMUSCULAR; INTRAVENOUS at 09:28

## 2019-05-01 RX ADMIN — Medication 0.3 ML: at 08:19

## 2019-05-01 ASSESSMENT — PULMONARY FUNCTION TESTS
PIF_VALUE: 0

## 2019-05-01 ASSESSMENT — PAIN SCALES - GENERAL: PAINLEVEL_OUTOF10: 0

## 2019-05-01 NOTE — ANESTHESIA POSTPROCEDURE EVALUATION
Department of Anesthesiology  Postprocedure Note    Patient: Corrine Walden  MRN: [id number]  YOB: 1950  Date of evaluation: 5/1/2019  Time:  12:24 PM     Procedure Summary     Date:  05/01/19 Room / Location:  [facility] 01 / [facility]    Anesthesia Start:  0928 Anesthesia Stop:  0945    Procedure:  PHACO EMULSIFICATION OF CATARACT WITH  INTRAOCULAR LENS IMPLANT (Left Eye) Diagnosis:  (CATARACT LEFT EYE)    Surgeon:  Lobito Tapia MD Responsible Provider:  Mallory العلي MD    Anesthesia Type:  MAC ASA Status:  3          Anesthesia Type: MAC    Umberto Phase I: Umberto Score: 10    Umberto Phase II: Umberto Score: 10    Last vitals: Reviewed and per EMR flowsheets.        Anesthesia Post Evaluation    Comments: Postoperative Anesthesia Note    Name:    Corrine Walden  MRN:      [id number]    Patient Vitals in the past 12 hrs:  05/01/19 1004, BP:132/70, Pulse:70, Resp:16, SpO2:96 %  05/01/19 0944, BP:139/84, Temp:97.6 °F (36.4 °C), Temp src:Temporal, Pulse:68, Resp:14, SpO2:95 %  05/01/19 0819, BP:135/68, Temp:97.7 °F (36.5 °C), Temp src:Temporal, Pulse:68, Resp:16, SpO2:93 %     LABS:    CBC  Lab Results       Component                Value               Date/Time                  WBC                      9.5                 04/19/2019 05:55 AM        HGB                      14.6                04/19/2019 05:55 AM        HCT                      42.4                04/19/2019 05:55 AM        PLT                      195                 04/19/2019 05:55 AM   RENAL  Lab Results       Component                Value               Date/Time                  NA                       143                 04/20/2019 04:45 AM        K                        4.0                 04/20/2019 04:45 AM        CL                       107                 04/20/2019 04:45 AM        CO2                      26                  04/20/2019 04:45 AM        BUN                      24 (H)              04/20/2019 04:45 AM CREATININE               1.0                 04/20/2019 04:45 AM        GLUCOSE                  103 (H)             04/20/2019 04:45 AM        GLUCOSE                  73                  10/21/2011 05:07 PM   COAGS  No results found for: PROTIME, INR, APTT    Intake & Output:  No intake/output data recorded. Nausea & Vomiting:  No    Level of Consciousness:  Awake    Pain Assessment:  Adequate analgesia    Anesthesia Complications:  No apparent anesthetic complications    SUMMARY      Vital signs stable  OK to discharge from Stage I post anesthesia care.   Care transferred from Anesthesiology department on discharge from perioperative area

## 2019-05-01 NOTE — PROGRESS NOTES
Device interrogation by company representative. See interrogation for further details. 1 WEEK POST CHECK. Incision check per RN. His Bundle pacing. No new arrhythmias. Normal device function.

## 2019-05-01 NOTE — PROGRESS NOTES
Report from [facility] and CRNA. Pt arrived to postop from OR. See doc flow for vitals and assessment. Will monitor.

## 2019-05-01 NOTE — ANESTHESIA PRE PROCEDURE
Department of Anesthesiology  Preprocedure Note       Name:  Aureliano Ruggiero   Age:  70 y.o.  :  1950                                          MRN:  [id number]         Date:  2019      Surgeon: Ardeen Crigler):  Eleonora Aaron MD    Procedure: DR DYLON JOHNSON KELECHI [facility] EMULSIFICATION OF CATARACT WITH  INTRAOCULAR LENS IMPLANT (Left Eye)    Medications prior to admission:   Prior to Admission medications    Medication Sig Start Date End Date Taking?  Authorizing Provider   telmisartan-hydrochlorothiazide (MICARDIS HCT) 80-25 MG per tablet TAKE ONE TABLET DAILY 19  Yes Kimberly Culp MD   pravastatin (PRAVACHOL) 80 MG tablet TAKE ONE TABLET DAILY AT BEDTIME 19  Yes Kimberly Culp MD   escitalopram (LEXAPRO) 10 MG tablet Take 1 tablet by mouth daily 19  Yes Kimberly Culp MD   albuterol sulfate  (90 Base) MCG/ACT inhaler Inhale 2 puffs into the lungs 4 times daily as needed for Wheezing 19   Kimberly Culp MD       Current medications:    Current Facility-Administered Medications   Medication Dose Route Frequency Provider Last Rate Last Dose    bupivacaine 0.75%, phenylephrine 10%, tropicamide 1%, cyclopentolate 1%, moxifloxacin 0.5%, ketorolac 0.5% in lidocaine 2% jelly ophthalmic solution  0.3 mL Ophthalmic Q10 Min PRN Eleonora Aaron MD   0.3 mL at 19 0819    moxifloxacin (VIGAMOX) 0.5 % ophthalmic solution 1 drop  1 drop Ophthalmic Continuous Eleonora Aaron MD        prednisoLONE acetate (PRED FORTE) 1 % ophthalmic suspension 1 drop  1 drop Ophthalmic Continuous Eleonora Aaron MD        epinephrine and lidocaine 2% PF in BSS injection (1 mL)   Intraocular Once Eleonora Aaron MD        lactated ringers infusion   Intravenous Continuous Ellie Rosas MD        sodium chloride flush 0.9 % injection 10 mL  10 mL Intravenous 2 times per day Ellie Rosas MD        sodium chloride flush 0.9 % injection 10 mL  10 mL Intravenous PRN MD Bonny Osborn lidocaine PF 1 % injection 1 mL  1 mL Intradermal Once PRN Hilaria Longo MD           Allergies:  No Known Allergies    Problem List:    Patient Active Problem List   Diagnosis Code    Hypertension I10    Hyperlipidemia E78.5    Hypertrophy of prostate without urinary obstruction N40.0    Anxiety F41.9    Moderate aortic insufficiency I35.1    Mild mitral regurgitation I34.0    Heart block I45.9    Pacemaker Z95.0       Past Medical History:        Diagnosis Date    Anxiety 12/21/2012    Hyperlipidemia     Hypertension     Hypertrophy of prostate without urinary obstruction     Mild mitral regurgitation 3/6/2018    Moderate aortic insufficiency 3/6/2018       Past Surgical History:        Procedure Laterality Date    COLONOSCOPY  4/21/2008    VITRECTOMY Left 2018       Social History:    Social History     Tobacco Use    Smoking status: Never Smoker    Smokeless tobacco: Never Used   Substance Use Topics    Alcohol use: Yes     Comment: rarely                                Counseling given: Not Answered      Vital Signs (Current):   Vitals:    04/29/19 1100   Weight: 230 lb (104.3 kg)   Height: 5' 6\" (1.676 m)                                              BP Readings from Last 3 Encounters:   04/20/19 122/69   04/19/19 99/60   04/16/19 130/80       NPO Status:                                                                                 BMI:   Wt Readings from Last 3 Encounters:   04/29/19 230 lb (104.3 kg)   04/19/19 223 lb (101.2 kg)   04/19/19 220 lb (99.8 kg)     Body mass index is 37.12 kg/m².     CBC:   Lab Results   Component Value Date    WBC 9.5 04/19/2019    RBC 4.73 04/19/2019    HGB 14.6 04/19/2019    HCT 42.4 04/19/2019    MCV 89.7 04/19/2019    RDW 13.6 04/19/2019     04/19/2019       CMP:   Lab Results   Component Value Date     04/20/2019    K 4.0 04/20/2019     04/20/2019    CO2 26 04/20/2019    BUN 24 04/20/2019    CREATININE 1.0 04/20/2019    GFRAA >60 04/20/2019    AGRATIO 1.2 04/19/2019    LABGLOM >60 04/20/2019    LABGLOM 85 07/19/2016    GLUCOSE 103 04/20/2019    GLUCOSE 73 10/21/2011    PROT 7.4 04/19/2019    PROT 7.2 02/26/2013    CALCIUM 8.4 04/20/2019    BILITOT 0.5 04/19/2019    ALKPHOS 57 04/19/2019    AST 19 04/19/2019    ALT 20 04/19/2019       POC Tests: No results for input(s): POCGLU, POCNA, POCK, POCCL, POCBUN, POCHEMO, POCHCT in the last 72 hours. Coags: No results found for: PROTIME, INR, APTT    HCG (If Applicable): No results found for: PREGTESTUR, PREGSERUM, HCG, HCGQUANT     ABGs: No results found for: PHART, PO2ART, ZWX3TLD, HUZ0DNF, BEART, U8WWIFOV     Type & Screen (If Applicable):  No results found for: LABABO, [address]    Anesthesia Evaluation  Patient summary reviewed and Nursing notes reviewed no history of anesthetic complications:   Airway: Mallampati: III     Neck ROM: full   Dental:          Pulmonary:                              Cardiovascular:    (+) hypertension:, valvular problems/murmurs: AI and MR, pacemaker (recent implant): pacemaker, dysrhythmias:,                   Neuro/Psych:               GI/Hepatic/Renal:            ROS comment: obesity. Endo/Other:                     Abdominal:           Vascular:                                        Anesthesia Plan      MAC     ASA 3     (Medications & allergies reviewed  All available lab & EKG data reviewed)  Induction: intravenous. Anesthetic plan and risks discussed with patient. Plan discussed with CRNA.                   Dilshad Mohan MD   5/1/2019

## 2019-05-23 ENCOUNTER — OFFICE VISIT (OUTPATIENT)
Dept: CARDIOLOGY CLINIC | Age: 69
End: 2019-05-23
Payer: COMMERCIAL

## 2019-05-23 VITALS
HEIGHT: 66 IN | DIASTOLIC BLOOD PRESSURE: 74 MMHG | HEART RATE: 86 BPM | OXYGEN SATURATION: 95 % | BODY MASS INDEX: 37.12 KG/M2 | WEIGHT: 231 LBS | SYSTOLIC BLOOD PRESSURE: 122 MMHG

## 2019-05-23 DIAGNOSIS — I45.9 HEART BLOCK: ICD-10-CM

## 2019-05-23 DIAGNOSIS — I10 ESSENTIAL HYPERTENSION: Primary | ICD-10-CM

## 2019-05-23 PROCEDURE — 99024 POSTOP FOLLOW-UP VISIT: CPT | Performed by: NURSE PRACTITIONER

## 2019-05-23 PROCEDURE — 93000 ELECTROCARDIOGRAM COMPLETE: CPT | Performed by: NURSE PRACTITIONER

## 2019-05-23 NOTE — LETTER
[address] [place] [address] Cardiology - [address] [place] [address]  Phone: [phone]  Fax: [phone]    DANIEL Macdonald - CNP        May 30, 2019     Pepe Huertas, [address] [facility] [place] [id number]    Patient: Simon Christian  MR Number: [id number]  YOB: 1950  Date of Visit: 5/23/2019       [address]   Electrophysiology Note              Date:  May 23, 2019  Patient name: Simon Christian  YOB: 1950    Primary Care physician: Pepe Huertas MD    HISTORY OF PRESENT ILLNESS: The patient is a 70 y.o.  male with a history of complete heart block, alternating BBB, HTN, HLD, and AI. He was admitted in 4/2019 with dizziness. Developed 2:1 AVB and CHB with alternating BBB. On 4/19/2019 he had a dual chamber pacemaker implanted at the HIS bundle. Device check on 4/26/2019 showed normal function, 12% AP, and 99.8% . Today he is being seen for CHB. EKG shows AS- with a WPW pattern and HR of 84. He is feeling well and denies chest pain, palpitations, shortness of breath, and dizziness. Past Medical History:   has a past medical history of Anxiety, Hyperlipidemia, Hypertension, Hypertrophy of prostate without urinary obstruction, Mild mitral regurgitation, and Moderate aortic insufficiency. Past Surgical History:   has a past surgical history that includes Colonoscopy (4/21/2008); vitrectomy (Left, 2018); Cataract removal with implant (Left, 05/01/2019); and Intracapsular cataract extraction (Left, 5/1/2019). Home Medications:    Prior to Admission medications    Medication Sig Start Date End Date Taking?  Authorizing Provider   telmisartan-hydrochlorothiazide (MICARDIS HCT) 80-25 MG per tablet TAKE ONE TABLET DAILY 4/16/19  Yes Ra Cifuentes MD   pravastatin (PRAVACHOL) 80 MG tablet TAKE ONE TABLET DAILY AT BEDTIME 4/16/19  Yes Ra Cifuentes MD Neurological:  · Alert and oriented  · Moves all extremities well  · No abnormalities of mood, affect, memory, mentation, or behavior are noted    DATA:    ECG 5/23/2019:  AS- with a HR of 84    Echo 4/19/2019:  Normal left ventricular systolic function with ejection fraction of 55-60%.   The study is inadequate to exclude regional wall motion abnormalities.   Systolic pulmonic artery pressure (SPAP) is normal estimated at 24 mmHg   (Right atrial pressure of 3 mmHg).   Compared to previous study from 12- no changes noted. Echo 12/20/2017:  Normal left ventricle systolic function with an estimated ejection fraction of 55%.   No regional wall motion abnormalities are seen.   Diastolic filling parameters suggest normal diastolic filing pressure.   Mild mitral regurgitation.   Individual aortic valve leaflets are not clearly visualized, aortic valve appears sclerotic.   Moderate aortic regurgitation.   Systolic pulmonary artery pressure (SPAP) is normal and estimated at 15 mmHg   (RA pressure of 3 mmHg). All labs and testing reviewed. CARDIOLOGY LABS:   CBC: No results for input(s): WBC, HGB, HCT, PLT in the last 72 hours. BMP: No results for input(s): NA, K, CO2, BUN, CREATININE, LABGLOM, GLUCOSE in the last 72 hours. PT/INR: No results for input(s): PROTIME, INR in the last 72 hours. APTT:No results for input(s): APTT in the last 72 hours. FASTING LIPID PANEL:  Lab Results   Component Value Date    HDL 49 01/23/2019    LDLCALC 109 01/23/2019    TRIG 121 01/23/2019     LIVER PROFILE:No results for input(s): AST, ALT, ALB in the last 72 hours. Assessment:   1. Complete heart block: ongoing    -s/p dual chamber pacemaker implant  In 5/2019 (implanted at the His bundle)  2. Alternating BBB  3. HTN: controlled  4. HLD  5. Aortic insufficiency: moderate    Plan:   1. Continue telmisartan-HCTZ  2. Arm/lifting restrictions lifted  3.  Follow up as scheduled in 7/2019 EKG and WPW pattern reviewed with Dr. Carlos Gibbons. WPW pattern is consistent with His bundle pacing. No changes indicated. Portia Mercedes, [address]  [phone]           If you have questions, please do not hesitate to call me. I look forward to following Livan Lopez along with you.     Sincerely,        Portia Mercedes, DANIEL - CNP

## 2019-05-23 NOTE — PROGRESS NOTES
[facility]   Electrophysiology Note              Date:  May 23, 2019  Patient name: Linus Seip  YOB: 1950    Primary Care physician: Estefania Benitez MD    HISTORY OF PRESENT ILLNESS: The patient is a 70 y.o.  male with a history of complete heart block, alternating BBB, HTN, HLD, and AI. He was admitted in 4/2019 with dizziness. Developed 2:1 AVB and CHB with alternating BBB. On 4/19/2019 he had a dual chamber pacemaker implanted at the HIS bundle. Device check on 4/26/2019 showed normal function, 12% AP, and 99.8% . Today he is being seen for CHB. EKG shows AS- with a WPW pattern and HR of 84. He is feeling well and denies chest pain, palpitations, shortness of breath, and dizziness. Past Medical History:   has a past medical history of Anxiety, Hyperlipidemia, Hypertension, Hypertrophy of prostate without urinary obstruction, Mild mitral regurgitation, and Moderate aortic insufficiency. Past Surgical History:   has a past surgical history that includes Colonoscopy (4/21/2008); vitrectomy (Left, 2018); Cataract removal with implant (Left, 05/01/2019); and Intracapsular cataract extraction (Left, 5/1/2019). Home Medications:    Prior to Admission medications    Medication Sig Start Date End Date Taking? Authorizing Provider   telmisartan-hydrochlorothiazide (MICARDIS HCT) 80-25 MG per tablet TAKE ONE TABLET DAILY 4/16/19  Yes Zulma Rosenbaum MD   pravastatin (PRAVACHOL) 80 MG tablet TAKE ONE TABLET DAILY AT BEDTIME 4/16/19  Yes Zulma Rosenbaum MD   albuterol sulfate  (90 Base) MCG/ACT inhaler Inhale 2 puffs into the lungs 4 times daily as needed for Wheezing 4/16/19  Yes Zulma Rosenbaum MD   escitalopram (LEXAPRO) 10 MG tablet Take 1 tablet by mouth daily 4/16/19  Yes Zulma Rosenbaum MD       Allergies:  Patient has no known allergies. Social History:   reports that he has never smoked.  He has never used smokeless tobacco. He reports that he drinks alcohol. He reports that he does not use drugs. Family History: family history includes Cancer in his father; Cancer (age of onset: 62) in his brother; No Known Problems in his sister; Other in his mother. Review of Systems   Constitutional: Negative. HENT: Negative. Eyes: Negative. Respiratory: Negative. Cardiovascular: see HPI  Gastrointestinal: Negative. Genitourinary: Negative. Musculoskeletal: Negative. Skin: Negative. Neurological: Negative. Hematological: Negative. Psychiatric/Behavioral: Negative. PHYSICAL EXAM:    Vital signs:    /74   Pulse 86   Ht 5' 6\" (1.676 m)   Wt 231 lb (104.8 kg)   SpO2 95%   BMI 37.28 kg/m²      Constitutional and general appearance: alert, cooperative, no distress and appears stated age  HEENT: PERRL, no cervical lymphadenopathy. No masses palpable.  Normal oral mucosa  Respiratory:  · Normal excursion and expansion without use of accessory muscles  · Resp auscultation: Normal breath sounds without wheezing, rhonchi, and rales  Cardiovascular:  · The apical impulse is not displaced  · Heart tones are crisp and normal. regular S1 and S2.  · Jugular venous pulsation Normal  · The carotid upstroke is normal in amplitude and contour without delay or bruit  · Peripheral pulses are symmetrical and full   Abdomen:  · No masses or tenderness  · Bowel sounds present  Extremities:  ·  No cyanosis or clubbing  ·  No lower extremity edema  ·  Skin: warm and dry; left upper chest incision is closed and healed   Neurological:  · Alert and oriented  · Moves all extremities well  · No abnormalities of mood, affect, memory, mentation, or behavior are noted    DATA:    ECG 5/23/2019:  AS- with a HR of 84    Echo 4/19/2019:  Normal left ventricular systolic function with ejection fraction of 55-60%.   The study is inadequate to exclude regional wall motion abnormalities.   Systolic pulmonic artery pressure (SPAP) is normal estimated at 24 mmHg   (Right atrial pressure of 3 mmHg).   Compared to previous study from 12- no changes noted. Echo 12/20/2017:  Normal left ventricle systolic function with an estimated ejection fraction of 55%.   No regional wall motion abnormalities are seen.   Diastolic filling parameters suggest normal diastolic filing pressure.   Mild mitral regurgitation.   Individual aortic valve leaflets are not clearly visualized, aortic valve appears sclerotic.   Moderate aortic regurgitation.   Systolic pulmonary artery pressure (SPAP) is normal and estimated at 15 mmHg   (RA pressure of 3 mmHg). All labs and testing reviewed. CARDIOLOGY LABS:   CBC: No results for input(s): WBC, HGB, HCT, PLT in the last 72 hours. BMP: No results for input(s): NA, K, CO2, BUN, CREATININE, LABGLOM, GLUCOSE in the last 72 hours. PT/INR: No results for input(s): PROTIME, INR in the last 72 hours. APTT:No results for input(s): APTT in the last 72 hours. FASTING LIPID PANEL:  Lab Results   Component Value Date    HDL 49 01/23/2019    LDLCALC 109 01/23/2019    TRIG 121 01/23/2019     LIVER PROFILE:No results for input(s): AST, ALT, ALB in the last 72 hours. Assessment:   1. Complete heart block: ongoing    -s/p dual chamber pacemaker implant  In 5/2019 (implanted at the His bundle)  2. Alternating BBB  3. HTN: controlled  4. HLD  5. Aortic insufficiency: moderate    Plan:   1. Continue telmisartan-HCTZ  2. Arm/lifting restrictions lifted  3. Follow up as scheduled in 7/2019    EKG and WPW pattern reviewed with Dr. Syed Coronado. WPW pattern is consistent with His bundle pacing. No changes indicated.      DANIEL Suggs-CNP  [employer]  [phone]

## 2019-07-12 RX ORDER — CITALOPRAM 20 MG/1
TABLET ORAL
Qty: 30 TABLET | Refills: 1 | Status: SHIPPED | OUTPATIENT
Start: 2019-07-12 | End: 2019-07-16 | Stop reason: SDUPTHER

## 2019-07-16 ENCOUNTER — OFFICE VISIT (OUTPATIENT)
Dept: INTERNAL MEDICINE CLINIC | Age: 69
End: 2019-07-16

## 2019-07-16 VITALS
BODY MASS INDEX: 36 KG/M2 | HEART RATE: 70 BPM | RESPIRATION RATE: 14 BRPM | WEIGHT: 224 LBS | HEIGHT: 66 IN | SYSTOLIC BLOOD PRESSURE: 118 MMHG | DIASTOLIC BLOOD PRESSURE: 80 MMHG

## 2019-07-16 DIAGNOSIS — Z95.0 PACEMAKER: ICD-10-CM

## 2019-07-16 DIAGNOSIS — Z00.00 ROUTINE GENERAL MEDICAL EXAMINATION AT A HEALTH CARE FACILITY: Primary | ICD-10-CM

## 2019-07-16 DIAGNOSIS — E78.5 HYPERLIPIDEMIA, UNSPECIFIED HYPERLIPIDEMIA TYPE: ICD-10-CM

## 2019-07-16 DIAGNOSIS — I35.1 MODERATE AORTIC INSUFFICIENCY: ICD-10-CM

## 2019-07-16 DIAGNOSIS — I34.0 MILD MITRAL REGURGITATION: ICD-10-CM

## 2019-07-16 DIAGNOSIS — I45.9 HEART BLOCK: ICD-10-CM

## 2019-07-16 PROCEDURE — 99397 PER PM REEVAL EST PAT 65+ YR: CPT | Performed by: INTERNAL MEDICINE

## 2019-07-16 RX ORDER — CITALOPRAM 20 MG/1
TABLET ORAL
Qty: 90 TABLET | Refills: 3 | Status: SHIPPED | OUTPATIENT
Start: 2019-07-16 | End: 2019-10-30 | Stop reason: SDUPTHER

## 2019-07-16 ASSESSMENT — ENCOUNTER SYMPTOMS
ABDOMINAL PAIN: 0
WHEEZING: 0
SHORTNESS OF BREATH: 0
VOMITING: 0
BACK PAIN: 0
RHINORRHEA: 0
NAUSEA: 0

## 2019-07-16 NOTE — PROGRESS NOTES
 Hypertrophy of prostate without urinary obstruction     Mild mitral regurgitation 3/6/2018    Moderate aortic insufficiency 3/6/2018       Past Surgical History:   Procedure Laterality Date    CARDIAC PACEMAKER PLACEMENT  04/19/2019    CATARACT REMOVAL WITH IMPLANT Left 05/01/2019    COLONOSCOPY  4/21/2008    INTRACAPSULAR CATARACT EXTRACTION Left 5/1/2019    PHACO EMULSIFICATION OF CATARACT WITH  INTRAOCULAR LENS IMPLANT performed by Edwige Rosenbaum MD at [facility] VITRECTOMY Left 2018     Family History   Problem Relation Age of Onset    Cancer Brother 62        prostate cancer    Other Mother         Lung fibrosis    Cancer Father         Prostate cancer    No Known Problems Sister        Social History     Tobacco Use    Smoking status: Never Smoker    Smokeless tobacco: Never Used   Substance Use Topics    Alcohol use: Yes     Comment: rarely    Drug use: No           /80 (Site: Left Upper Arm, Position: Sitting, Cuff Size: Large Adult)   Pulse 70   Resp 14   Ht 5' 6\" (1.676 m)   Wt 224 lb (101.6 kg)   BMI 36.15 kg/m²      Objective:   Physical Exam   Constitutional: He appears well-developed and well-nourished. HENT:   Head: Normocephalic and atraumatic. Eyes: Pupils are equal, round, and reactive to light. Conjunctivae and EOM are normal. No scleral icterus. Neck: Normal range of motion. Neck supple. No thyromegaly present. Cardiovascular: Normal rate and regular rhythm. No murmur heard. Pulmonary/Chest: Effort normal and breath sounds normal. He has no wheezes. Abdominal: Soft. He exhibits no mass. There is no tenderness. There is no rebound. Musculoskeletal: He exhibits no edema. Lymphadenopathy:     He has no cervical adenopathy. Assessment:       Diagnosis Orders   1. Routine general medical examination at a health care facility     2. Hyperlipidemia, unspecified hyperlipidemia type     3. Moderate aortic insufficiency     4.  Mild mitral regurgitation 5. Heart block     6. Pacemaker            Plan:      Decrease calorie intake. Annual physical done. CHB: he has a pacemaker. Hypertension:  Well controlled. Hyperlipidemia:  At goal.   Anxiety: well controlled on Celexa. BPH stable. He has a FH of prostate cancer. Kapil Mckeon received counseling on the following healthy behaviors: nutrition and exercise  Reviewed prior labs and health maintenance  Continue current medications, diet and exercise. Discussed use, benefit, and side effects of prescribed medications. Barriers to medication compliance addressed. Patient given educational materials - see patient instructions  Was a self-tracking handout given in paper form or via [employer]? Yes    Requested Prescriptions     Signed Prescriptions Disp Refills    citalopram (CELEXA) 20 MG tablet 90 tablet 3     Sig: TAKE ONE TABLET DAILY       All patient questions answered. Patient voiced understanding. Quality Measures    Body mass index is 36.15 kg/m². Elevated. Weight control planned discussed conventional weight loss and Healthy diet and regular exercise. BP: 118/80. Blood pressure is normal. Treatment plan consists of No treatment change needed. Fall Risk 4/16/2019 5/5/2017 3/7/2016 1/26/2015   2 or more falls in past year? no no no no   Fall with injury in past year? no no no no     The patient does not have a history of falls. I did not - not indicated , complete a risk assessment for falls.  A plan of care for falls No Treatment plan indicated    Lab Results   Component Value Date    LDLCALC 109 (H) 01/23/2019    (goal LDL reduction with dx if diabetes is 50% LDL reduction)    PHQ Scores 4/16/2019 5/5/2017 3/7/2016 1/26/2015   PHQ2 Score 0 0 0 0   PHQ9 Score 0 0 0 0     Interpretation of Total Score Depression Severity: 1-4 = Minimal depression, 5-9 = Mild depression, 10-14 = Moderate depression, 15-19 = Moderately severe depression, 20-27 = Severe depression       Exercise,weight loss

## 2019-07-16 NOTE — PROGRESS NOTES
Visit Information    Have you changed or started any medications since your last visit including any over-the-counter medicines, vitamins, or herbal medicines? no   Are you having any side effects from any of your medications? -  no  Have you stopped taking any of your medications? Is so, why? -  no    Have you seen any other physician or provider since your last visit? Yes - Records Obtained Records in EPIC  Have you had any other diagnostic tests since your last visit? No  Have you been seen in the emergency room and/or had an admission to a hospital since we last saw you? Yes - Records Obtained  Have you had your routine dental cleaning in the past 6 months? no    Have you activated your [employer] account? If not, what are your barriers?  Yes     Patient Care Team:  Layton Solis MD as PCP - General (Internal Medicine)  Layton Solis MD as PCP - [facility]    Medical History Review  Past Medical, Family, and Social History reviewed and does not contribute to the patient presenting condition    Health Maintenance   Topic Date Due    DTaP/Tdap/Td vaccine (1 - Tdap) 08/03/1968    Diabetes screen  08/03/1989    Shingles Vaccine (1 of 2) 08/03/1999    Flu vaccine (1) 09/01/2019    Potassium monitoring  04/20/2020    Creatinine monitoring  04/20/2020    Lipid screen  01/23/2024    Colon cancer screen colonoscopy  07/17/2028    Pneumococcal 65+ years Vaccine  Completed    Hepatitis C screen  Completed

## 2019-07-23 NOTE — PROGRESS NOTES
[address]   Electrophysiology Note              Date:  July 24, 2019  Patient name: Irene Morris  YOB: 1950    Primary Care physician: Blank Pierce MD    HISTORY OF PRESENT ILLNESS: The patient is a 70 y.o.  male with a history of complete heart block, alternating BBB, HTN, HLD, and AI. He was admitted in 4/2019 with dizziness. Developed 2:1 AVB and CHB with alternating BBB. On 4/19/2019 he had a dual chamber pacemaker implanted at the HIS bundle. Device check on 4/26/2019 showed normal function, 12% AP, and 99.8% . In May 2019, EKG showed AS- with a WPW pattern and HR of 84. EKG and WPW pattern reviewed with Dr. Jerilyn Prakash. WPW pattern is consistent with His bundle pacing. Today he is being seen for complete heart block. Device check today shows normal function, 3% AP, and 100%  (HIS pacing). Battery life expectancy is 3.5 years. BP is elevated. Home BP averages 120/75. He denies chest pain, palpitations, shortness of breath, and dizziness. Past Medical History:   has a past medical history of Anxiety, Hyperlipidemia, Hypertension, Hypertrophy of prostate without urinary obstruction, Mild mitral regurgitation, and Moderate aortic insufficiency. Past Surgical History:   has a past surgical history that includes Colonoscopy (4/21/2008); vitrectomy (Left, 2018); Cataract removal with implant (Left, 05/01/2019); Intracapsular cataract extraction (Left, 5/1/2019); and Cardiac pacemaker placement (04/19/2019). Home Medications:    Prior to Admission medications    Medication Sig Start Date End Date Taking?  Authorizing Provider   citalopram (CELEXA) 20 MG tablet TAKE ONE TABLET DAILY 7/16/19  Yes Dorothy Sierra MD   telmisartan-hydrochlorothiazide (MICARDIS HCT) 80-25 MG per tablet TAKE ONE TABLET DAILY 4/16/19  Yes Dorothy Sierra MD   pravastatin (PRAVACHOL) 80 MG tablet TAKE ONE TABLET DAILY AT BEDTIME 4/16/19  Yes Noreen Headley

## 2019-07-24 ENCOUNTER — PROCEDURE VISIT (OUTPATIENT)
Dept: CARDIOLOGY CLINIC | Age: 69
End: 2019-07-24
Payer: COMMERCIAL

## 2019-07-24 ENCOUNTER — OFFICE VISIT (OUTPATIENT)
Dept: CARDIOLOGY CLINIC | Age: 69
End: 2019-07-24
Payer: COMMERCIAL

## 2019-07-24 VITALS
DIASTOLIC BLOOD PRESSURE: 75 MMHG | WEIGHT: 226.8 LBS | HEIGHT: 66 IN | HEART RATE: 74 BPM | BODY MASS INDEX: 36.45 KG/M2 | OXYGEN SATURATION: 95 % | SYSTOLIC BLOOD PRESSURE: 120 MMHG

## 2019-07-24 DIAGNOSIS — I45.9 HEART BLOCK: Primary | ICD-10-CM

## 2019-07-24 DIAGNOSIS — I45.9 HEART BLOCK: ICD-10-CM

## 2019-07-24 DIAGNOSIS — I10 ESSENTIAL HYPERTENSION: ICD-10-CM

## 2019-07-24 DIAGNOSIS — Z95.0 PACEMAKER: ICD-10-CM

## 2019-07-24 DIAGNOSIS — I44.7 LBBB (LEFT BUNDLE BRANCH BLOCK): ICD-10-CM

## 2019-07-24 PROCEDURE — 99214 OFFICE O/P EST MOD 30 MIN: CPT | Performed by: NURSE PRACTITIONER

## 2019-07-24 PROCEDURE — 93280 PM DEVICE PROGR EVAL DUAL: CPT | Performed by: INTERNAL MEDICINE

## 2019-07-24 NOTE — PATIENT INSTRUCTIONS
No changes   Bring BP cuff here next visit  Follow up with Dr. Alireza Whitfield in 3 months   Monitor BP at home and call if consistently out of goal ranges.

## 2019-07-25 NOTE — PROGRESS NOTES
Device interrogation by company representative. See interrogation for further details. Patient to see NP-Carol Ann today. RV his bundle pacing. WPW pattern on ECG is consistent with His bundle pacing. Follow up in office in 3 months. Report was not synced to paceart and needs scanned into epic.

## 2019-07-26 PROBLEM — I44.7 LBBB (LEFT BUNDLE BRANCH BLOCK): Status: ACTIVE | Noted: 2019-07-26

## 2019-08-12 RX ORDER — PRAVASTATIN SODIUM 80 MG/1
TABLET ORAL
Qty: 30 TABLET | Refills: 2 | Status: SHIPPED | OUTPATIENT
Start: 2019-08-12 | End: 2019-10-30 | Stop reason: SDUPTHER

## 2019-08-26 ENCOUNTER — TELEPHONE (OUTPATIENT)
Dept: INTERNAL MEDICINE CLINIC | Age: 69
End: 2019-08-26

## 2019-08-26 RX ORDER — VENLAFAXINE HYDROCHLORIDE 75 MG/1
CAPSULE, EXTENDED RELEASE ORAL
Qty: 45 CAPSULE | Refills: 1 | Status: SHIPPED | OUTPATIENT
Start: 2019-08-26 | End: 2019-10-30 | Stop reason: SDUPTHER

## 2019-08-27 ENCOUNTER — TELEPHONE (OUTPATIENT)
Dept: INTERNAL MEDICINE CLINIC | Age: 69
End: 2019-08-27

## 2019-10-30 ENCOUNTER — OFFICE VISIT (OUTPATIENT)
Dept: INTERNAL MEDICINE CLINIC | Age: 69
End: 2019-10-30

## 2019-10-30 VITALS
DIASTOLIC BLOOD PRESSURE: 80 MMHG | SYSTOLIC BLOOD PRESSURE: 130 MMHG | RESPIRATION RATE: 14 BRPM | WEIGHT: 221 LBS | HEART RATE: 70 BPM | BODY MASS INDEX: 35.52 KG/M2 | HEIGHT: 66 IN

## 2019-10-30 DIAGNOSIS — I10 ESSENTIAL HYPERTENSION: Primary | ICD-10-CM

## 2019-10-30 DIAGNOSIS — F41.9 ANXIETY: ICD-10-CM

## 2019-10-30 DIAGNOSIS — I35.1 MODERATE AORTIC INSUFFICIENCY: ICD-10-CM

## 2019-10-30 DIAGNOSIS — I10 ESSENTIAL HYPERTENSION: ICD-10-CM

## 2019-10-30 DIAGNOSIS — I45.9 HEART BLOCK: ICD-10-CM

## 2019-10-30 DIAGNOSIS — Z95.0 PACEMAKER: ICD-10-CM

## 2019-10-30 DIAGNOSIS — I34.0 MILD MITRAL REGURGITATION: ICD-10-CM

## 2019-10-30 DIAGNOSIS — E78.5 HYPERLIPIDEMIA, UNSPECIFIED HYPERLIPIDEMIA TYPE: ICD-10-CM

## 2019-10-30 LAB
A/G RATIO: 1.2 (ref 1.1–2.2)
ALBUMIN SERPL-MCNC: 4.2 G/DL (ref 3.4–5)
ALP BLD-CCNC: 61 U/L (ref 40–129)
ALT SERPL-CCNC: 20 U/L (ref 10–40)
ANION GAP SERPL CALCULATED.3IONS-SCNC: 16 MMOL/L (ref 3–16)
AST SERPL-CCNC: 28 U/L (ref 15–37)
BASOPHILS ABSOLUTE: 0.1 K/UL (ref 0–0.2)
BASOPHILS RELATIVE PERCENT: 0.6 %
BILIRUB SERPL-MCNC: 0.7 MG/DL (ref 0–1)
BUN BLDV-MCNC: 16 MG/DL (ref 7–20)
CALCIUM SERPL-MCNC: 9.6 MG/DL (ref 8.3–10.6)
CHLORIDE BLD-SCNC: 100 MMOL/L (ref 99–110)
CHOLESTEROL, TOTAL: 133 MG/DL (ref 0–199)
CO2: 24 MMOL/L (ref 21–32)
CREAT SERPL-MCNC: 1 MG/DL (ref 0.8–1.3)
EOSINOPHILS ABSOLUTE: 0.1 K/UL (ref 0–0.6)
EOSINOPHILS RELATIVE PERCENT: 1.4 %
GFR AFRICAN AMERICAN: >60
GFR NON-AFRICAN AMERICAN: >60
GLOBULIN: 3.4 G/DL
GLUCOSE BLD-MCNC: 90 MG/DL (ref 70–99)
HCT VFR BLD CALC: 45.2 % (ref 40.5–52.5)
HDLC SERPL-MCNC: 50 MG/DL (ref 40–60)
HEMOGLOBIN: 15.5 G/DL (ref 13.5–17.5)
LDL CHOLESTEROL CALCULATED: 65 MG/DL
LYMPHOCYTES ABSOLUTE: 1.7 K/UL (ref 1–5.1)
LYMPHOCYTES RELATIVE PERCENT: 19.8 %
MCH RBC QN AUTO: 30.4 PG (ref 26–34)
MCHC RBC AUTO-ENTMCNC: 34.2 G/DL (ref 31–36)
MCV RBC AUTO: 88.8 FL (ref 80–100)
MONOCYTES ABSOLUTE: 1 K/UL (ref 0–1.3)
MONOCYTES RELATIVE PERCENT: 10.9 %
NEUTROPHILS ABSOLUTE: 5.9 K/UL (ref 1.7–7.7)
NEUTROPHILS RELATIVE PERCENT: 67.3 %
PDW BLD-RTO: 14.3 % (ref 12.4–15.4)
PLATELET # BLD: 202 K/UL (ref 135–450)
PMV BLD AUTO: 9.1 FL (ref 5–10.5)
POTASSIUM SERPL-SCNC: 3.8 MMOL/L (ref 3.5–5.1)
RBC # BLD: 5.09 M/UL (ref 4.2–5.9)
SODIUM BLD-SCNC: 140 MMOL/L (ref 136–145)
TOTAL PROTEIN: 7.6 G/DL (ref 6.4–8.2)
TRIGL SERPL-MCNC: 88 MG/DL (ref 0–150)
URIC ACID, SERUM: 7.5 MG/DL (ref 3.5–7.2)
VLDLC SERPL CALC-MCNC: 18 MG/DL
WBC # BLD: 8.8 K/UL (ref 4–11)

## 2019-10-30 PROCEDURE — 99214 OFFICE O/P EST MOD 30 MIN: CPT | Performed by: INTERNAL MEDICINE

## 2019-10-30 RX ORDER — PRAVASTATIN SODIUM 80 MG/1
TABLET ORAL
Qty: 90 TABLET | Refills: 3 | Status: SHIPPED | OUTPATIENT
Start: 2019-10-30 | End: 2020-07-29 | Stop reason: SDUPTHER

## 2019-10-30 RX ORDER — VENLAFAXINE HYDROCHLORIDE 75 MG/1
CAPSULE, EXTENDED RELEASE ORAL
Qty: 90 CAPSULE | Refills: 3 | Status: SHIPPED | OUTPATIENT
Start: 2019-10-30 | End: 2020-07-29 | Stop reason: SDUPTHER

## 2019-10-30 RX ORDER — CITALOPRAM 20 MG/1
TABLET ORAL
Qty: 90 TABLET | Refills: 3 | Status: SHIPPED | OUTPATIENT
Start: 2019-10-30 | End: 2020-07-29 | Stop reason: SDUPTHER

## 2019-10-30 RX ORDER — TELMISARTAN AND HYDROCHLORTHIAZIDE 80; 25 MG/1; MG/1
TABLET ORAL
Qty: 90 TABLET | Refills: 3 | Status: SHIPPED | OUTPATIENT
Start: 2019-10-30 | End: 2020-07-29 | Stop reason: SDUPTHER

## 2019-10-30 ASSESSMENT — ENCOUNTER SYMPTOMS
COUGH: 0
RHINORRHEA: 0
VOMITING: 0
WHEEZING: 0
SHORTNESS OF BREATH: 0
NAUSEA: 0
BACK PAIN: 0
ABDOMINAL PAIN: 0

## 2019-11-05 ENCOUNTER — NURSE ONLY (OUTPATIENT)
Dept: CARDIOLOGY CLINIC | Age: 69
End: 2019-11-05
Payer: COMMERCIAL

## 2019-11-05 DIAGNOSIS — I45.9 HEART BLOCK: ICD-10-CM

## 2019-11-05 DIAGNOSIS — Z95.0 PACEMAKER: ICD-10-CM

## 2019-11-05 PROCEDURE — 93296 REM INTERROG EVL PM/IDS: CPT | Performed by: INTERNAL MEDICINE

## 2019-11-05 PROCEDURE — 93294 REM INTERROG EVL PM/LDLS PM: CPT | Performed by: INTERNAL MEDICINE

## 2019-11-19 ENCOUNTER — NURSE ONLY (OUTPATIENT)
Dept: CARDIOLOGY CLINIC | Age: 69
End: 2019-11-19
Payer: COMMERCIAL

## 2019-11-19 ENCOUNTER — OFFICE VISIT (OUTPATIENT)
Dept: CARDIOLOGY CLINIC | Age: 69
End: 2019-11-19
Payer: COMMERCIAL

## 2019-11-19 VITALS — BODY MASS INDEX: 35.68 KG/M2 | HEIGHT: 66 IN | WEIGHT: 222 LBS

## 2019-11-19 DIAGNOSIS — I10 ESSENTIAL HYPERTENSION: ICD-10-CM

## 2019-11-19 DIAGNOSIS — Z95.0 PACEMAKER: ICD-10-CM

## 2019-11-19 DIAGNOSIS — I45.9 HEART BLOCK: Primary | ICD-10-CM

## 2019-11-19 DIAGNOSIS — I44.7 LBBB (LEFT BUNDLE BRANCH BLOCK): ICD-10-CM

## 2019-11-19 DIAGNOSIS — I45.9 HEART BLOCK: ICD-10-CM

## 2019-11-19 PROCEDURE — 93280 PM DEVICE PROGR EVAL DUAL: CPT | Performed by: INTERNAL MEDICINE

## 2019-11-19 PROCEDURE — 99214 OFFICE O/P EST MOD 30 MIN: CPT | Performed by: INTERNAL MEDICINE

## 2020-02-18 ENCOUNTER — NURSE ONLY (OUTPATIENT)
Dept: CARDIOLOGY CLINIC | Age: 70
End: 2020-02-18
Payer: COMMERCIAL

## 2020-02-18 PROCEDURE — 93296 REM INTERROG EVL PM/IDS: CPT | Performed by: INTERNAL MEDICINE

## 2020-02-18 PROCEDURE — 93294 REM INTERROG EVL PM/LDLS PM: CPT | Performed by: INTERNAL MEDICINE

## 2020-03-25 RX ORDER — ESCITALOPRAM OXALATE 10 MG/1
10 TABLET ORAL DAILY
Qty: 30 TABLET | Refills: 0 | Status: SHIPPED | OUTPATIENT
Start: 2020-03-25 | End: 2020-07-29 | Stop reason: SDUPTHER

## 2020-05-20 ENCOUNTER — NURSE ONLY (OUTPATIENT)
Dept: CARDIOLOGY CLINIC | Age: 70
End: 2020-05-20
Payer: COMMERCIAL

## 2020-05-20 PROCEDURE — 93294 REM INTERROG EVL PM/LDLS PM: CPT | Performed by: INTERNAL MEDICINE

## 2020-05-20 PROCEDURE — 93296 REM INTERROG EVL PM/IDS: CPT | Performed by: INTERNAL MEDICINE

## 2020-05-21 NOTE — PROGRESS NOTES
Carelink transmission shows normal sensing and pacing function. See interrogation for more details.  (hbp) 100%. No  arrhythmias recorded. Follow up in 3 months via carelink.

## 2020-07-29 ENCOUNTER — OFFICE VISIT (OUTPATIENT)
Dept: INTERNAL MEDICINE CLINIC | Age: 70
End: 2020-07-29

## 2020-07-29 ENCOUNTER — TELEPHONE (OUTPATIENT)
Dept: INTERNAL MEDICINE CLINIC | Age: 70
End: 2020-07-29

## 2020-07-29 VITALS
HEIGHT: 66 IN | RESPIRATION RATE: 12 BRPM | DIASTOLIC BLOOD PRESSURE: 70 MMHG | SYSTOLIC BLOOD PRESSURE: 138 MMHG | WEIGHT: 225 LBS | BODY MASS INDEX: 36.16 KG/M2 | HEART RATE: 70 BPM

## 2020-07-29 DIAGNOSIS — I10 ESSENTIAL HYPERTENSION: ICD-10-CM

## 2020-07-29 LAB
A/G RATIO: 1.2 (ref 1.1–2.2)
ALBUMIN SERPL-MCNC: 3.9 G/DL (ref 3.4–5)
ALP BLD-CCNC: 63 U/L (ref 40–129)
ALT SERPL-CCNC: 19 U/L (ref 10–40)
ANION GAP SERPL CALCULATED.3IONS-SCNC: 15 MMOL/L (ref 3–16)
AST SERPL-CCNC: 25 U/L (ref 15–37)
BASOPHILS ABSOLUTE: 0.1 K/UL (ref 0–0.2)
BASOPHILS RELATIVE PERCENT: 0.6 %
BILIRUB SERPL-MCNC: 0.4 MG/DL (ref 0–1)
BUN BLDV-MCNC: 15 MG/DL (ref 7–20)
CALCIUM SERPL-MCNC: 9.4 MG/DL (ref 8.3–10.6)
CHLORIDE BLD-SCNC: 103 MMOL/L (ref 99–110)
CO2: 24 MMOL/L (ref 21–32)
CREAT SERPL-MCNC: 0.8 MG/DL (ref 0.8–1.3)
EOSINOPHILS ABSOLUTE: 0.1 K/UL (ref 0–0.6)
EOSINOPHILS RELATIVE PERCENT: 1 %
GFR AFRICAN AMERICAN: >60
GFR NON-AFRICAN AMERICAN: >60
GLOBULIN: 3.2 G/DL
GLUCOSE BLD-MCNC: 156 MG/DL (ref 70–99)
HCT VFR BLD CALC: 45.1 % (ref 40.5–52.5)
HEMOGLOBIN: 15.5 G/DL (ref 13.5–17.5)
LYMPHOCYTES ABSOLUTE: 1.8 K/UL (ref 1–5.1)
LYMPHOCYTES RELATIVE PERCENT: 18.8 %
MCH RBC QN AUTO: 30.6 PG (ref 26–34)
MCHC RBC AUTO-ENTMCNC: 34.4 G/DL (ref 31–36)
MCV RBC AUTO: 89 FL (ref 80–100)
MONOCYTES ABSOLUTE: 0.8 K/UL (ref 0–1.3)
MONOCYTES RELATIVE PERCENT: 8.6 %
NEUTROPHILS ABSOLUTE: 6.9 K/UL (ref 1.7–7.7)
NEUTROPHILS RELATIVE PERCENT: 71 %
PDW BLD-RTO: 14.3 % (ref 12.4–15.4)
PLATELET # BLD: 206 K/UL (ref 135–450)
PMV BLD AUTO: 9.2 FL (ref 5–10.5)
POTASSIUM SERPL-SCNC: 3.7 MMOL/L (ref 3.5–5.1)
RBC # BLD: 5.07 M/UL (ref 4.2–5.9)
SODIUM BLD-SCNC: 142 MMOL/L (ref 136–145)
TOTAL PROTEIN: 7.1 G/DL (ref 6.4–8.2)
WBC # BLD: 9.7 K/UL (ref 4–11)

## 2020-07-29 PROCEDURE — 99213 OFFICE O/P EST LOW 20 MIN: CPT | Performed by: INTERNAL MEDICINE

## 2020-07-29 RX ORDER — TELMISARTAN AND HYDROCHLORTHIAZIDE 80; 25 MG/1; MG/1
TABLET ORAL
Qty: 90 TABLET | Refills: 3 | Status: SHIPPED | OUTPATIENT
Start: 2020-07-29 | End: 2021-02-02 | Stop reason: SDUPTHER

## 2020-07-29 RX ORDER — VENLAFAXINE HYDROCHLORIDE 75 MG/1
CAPSULE, EXTENDED RELEASE ORAL
Qty: 90 CAPSULE | Refills: 3 | Status: SHIPPED | OUTPATIENT
Start: 2020-07-29 | End: 2021-02-02 | Stop reason: SDUPTHER

## 2020-07-29 RX ORDER — CITALOPRAM 20 MG/1
TABLET ORAL
Qty: 90 TABLET | Refills: 3 | Status: SHIPPED | OUTPATIENT
Start: 2020-07-29 | End: 2020-07-29

## 2020-07-29 RX ORDER — PRAVASTATIN SODIUM 80 MG/1
TABLET ORAL
Qty: 90 TABLET | Refills: 3 | Status: SHIPPED | OUTPATIENT
Start: 2020-07-29 | End: 2021-02-02 | Stop reason: SDUPTHER

## 2020-07-29 RX ORDER — ESCITALOPRAM OXALATE 10 MG/1
10 TABLET ORAL DAILY
Qty: 30 TABLET | Refills: 0 | Status: SHIPPED | OUTPATIENT
Start: 2020-07-29 | End: 2020-11-02 | Stop reason: SDUPTHER

## 2020-07-29 ASSESSMENT — ENCOUNTER SYMPTOMS
RHINORRHEA: 0
BACK PAIN: 0
SHORTNESS OF BREATH: 0
COUGH: 0
WHEEZING: 0
NAUSEA: 0
ABDOMINAL PAIN: 0
VOMITING: 0

## 2020-07-29 NOTE — TELEPHONE ENCOUNTER
----- Message from [employer] sent at 7/29/2020  3:53 PM EDT -----  Pharmacy called because you prescribed pt Lexapro and Celexa, they want to know if pt is supposed to be taking both. Please advise.    Catherine Lemus

## 2020-07-29 NOTE — PROGRESS NOTES
Subjective:      Patient ID: Alexander Watt is a 78 y.o. . HPI  Patient is here for follow up of hypertension and hyperlipidemia. Patient's BP is controlled on current medications. No chest pain or dyspnea. His cholesterol is at goal. No myalgia. His anxiety is controlled. He had a complete heart block and has a pacemaker. He has moderate AI and mild MR. No changes. His vision is stable. He has had a prior vitrectomy. He has an appt with the eye doctor. Review of Systems   Constitutional: Negative for activity change and appetite change. HENT: Negative for postnasal drip and rhinorrhea. Respiratory: Negative for cough, shortness of breath and wheezing. Cardiovascular: Negative for chest pain, palpitations and leg swelling. Gastrointestinal: Negative for abdominal pain, nausea and vomiting. Genitourinary: Negative for difficulty urinating and frequency. Musculoskeletal: Negative for back pain and joint swelling. Skin: Negative for rash. Neurological: Negative for light-headedness. Psychiatric/Behavioral: Negative for sleep disturbance. Current Outpatient Medications   Medication Sig Dispense Refill    escitalopram (LEXAPRO) 10 MG tablet Take 1 tablet by mouth daily 30 tablet 0    venlafaxine (EFFEXOR XR) 75 MG extended release capsule Take one tablet daily 90 capsule 3    pravastatin (PRAVACHOL) 80 MG tablet TAKE ONE TABLET DAILY AT BEDTIME 90 tablet 3    citalopram (CELEXA) 20 MG tablet TAKE ONE TABLET DAILY 90 tablet 3    telmisartan-hydrochlorothiazide (MICARDIS HCT) 80-25 MG per tablet TAKE ONE TABLET DAILY 90 tablet 3    albuterol sulfate  (90 Base) MCG/ACT inhaler Inhale 2 puffs into the lungs 4 times daily as needed for Wheezing (Patient not taking: Reported on 11/19/2019) 1 Inhaler 0     No current facility-administered medications for this visit.        There are no changes to past medical history, family history, social history or review of ECHO.        Decrease calorie intake. Exercise,weight loss recommended. The current medical regimen is effective;  continue present plan and medications. See orders.

## 2020-08-04 ENCOUNTER — APPOINTMENT (OUTPATIENT)
Dept: GENERAL RADIOLOGY | Age: 70
End: 2020-08-04
Payer: COMMERCIAL

## 2020-08-04 ENCOUNTER — NURSE ONLY (OUTPATIENT)
Dept: CARDIOLOGY CLINIC | Age: 70
End: 2020-08-04
Payer: COMMERCIAL

## 2020-08-04 ENCOUNTER — HOSPITAL ENCOUNTER (EMERGENCY)
Age: 70
Discharge: HOME | End: 2020-08-04
Attending: EMERGENCY MEDICINE
Payer: COMMERCIAL

## 2020-08-04 VITALS
WEIGHT: 220 LBS | TEMPERATURE: 97.2 F | HEIGHT: 66 IN | BODY MASS INDEX: 35.36 KG/M2 | OXYGEN SATURATION: 97 % | HEART RATE: 72 BPM | DIASTOLIC BLOOD PRESSURE: 69 MMHG | RESPIRATION RATE: 17 BRPM | SYSTOLIC BLOOD PRESSURE: 128 MMHG

## 2020-08-04 LAB
A/G RATIO: 1.2 (ref 1.1–2.2)
ALBUMIN SERPL-MCNC: 3.8 G/DL (ref 3.4–5)
ALP BLD-CCNC: 58 U/L (ref 40–129)
ALT SERPL-CCNC: 20 U/L (ref 10–40)
ANION GAP SERPL CALCULATED.3IONS-SCNC: 9 MMOL/L (ref 3–16)
AST SERPL-CCNC: 21 U/L (ref 15–37)
BASOPHILS ABSOLUTE: 0.1 K/UL (ref 0–0.2)
BASOPHILS RELATIVE PERCENT: 0.8 %
BILIRUB SERPL-MCNC: 0.4 MG/DL (ref 0–1)
BUN BLDV-MCNC: 19 MG/DL (ref 7–20)
CALCIUM SERPL-MCNC: 9.4 MG/DL (ref 8.3–10.6)
CHLORIDE BLD-SCNC: 106 MMOL/L (ref 99–110)
CO2: 27 MMOL/L (ref 21–32)
CREAT SERPL-MCNC: 1 MG/DL (ref 0.8–1.3)
EKG ATRIAL RATE: 80 BPM
EKG DIAGNOSIS: NORMAL
EKG P AXIS: 51 DEGREES
EKG P-R INTERVAL: 202 MS
EKG Q-T INTERVAL: 494 MS
EKG QRS DURATION: 140 MS
EKG QTC CALCULATION (BAZETT): 569 MS
EKG R AXIS: 253 DEGREES
EKG T AXIS: 2 DEGREES
EKG VENTRICULAR RATE: 80 BPM
EOSINOPHILS ABSOLUTE: 0.1 K/UL (ref 0–0.6)
EOSINOPHILS RELATIVE PERCENT: 1.1 %
GFR AFRICAN AMERICAN: >60
GFR NON-AFRICAN AMERICAN: >60
GLOBULIN: 3.1 G/DL
GLUCOSE BLD-MCNC: 83 MG/DL (ref 70–99)
HCT VFR BLD CALC: 47.4 % (ref 40.5–52.5)
HEMOGLOBIN: 16.1 G/DL (ref 13.5–17.5)
LYMPHOCYTES ABSOLUTE: 1.9 K/UL (ref 1–5.1)
LYMPHOCYTES RELATIVE PERCENT: 19 %
MCH RBC QN AUTO: 30.4 PG (ref 26–34)
MCHC RBC AUTO-ENTMCNC: 34.1 G/DL (ref 31–36)
MCV RBC AUTO: 89.2 FL (ref 80–100)
MONOCYTES ABSOLUTE: 1.1 K/UL (ref 0–1.3)
MONOCYTES RELATIVE PERCENT: 11.3 %
NEUTROPHILS ABSOLUTE: 6.7 K/UL (ref 1.7–7.7)
NEUTROPHILS RELATIVE PERCENT: 67.8 %
PDW BLD-RTO: 13.9 % (ref 12.4–15.4)
PLATELET # BLD: 202 K/UL (ref 135–450)
PMV BLD AUTO: 8.6 FL (ref 5–10.5)
POTASSIUM SERPL-SCNC: 4 MMOL/L (ref 3.5–5.1)
PRO-BNP: 98 PG/ML (ref 0–124)
RBC # BLD: 5.31 M/UL (ref 4.2–5.9)
REASON FOR REJECTION: NORMAL
REJECTED TEST: NORMAL
SODIUM BLD-SCNC: 142 MMOL/L (ref 136–145)
TOTAL PROTEIN: 6.9 G/DL (ref 6.4–8.2)
TROPONIN: <0.01 NG/ML
WBC # BLD: 9.8 K/UL (ref 4–11)

## 2020-08-04 PROCEDURE — 83880 ASSAY OF NATRIURETIC PEPTIDE: CPT

## 2020-08-04 PROCEDURE — 93280 PM DEVICE PROGR EVAL DUAL: CPT | Performed by: INTERNAL MEDICINE

## 2020-08-04 PROCEDURE — 93010 ELECTROCARDIOGRAM REPORT: CPT | Performed by: INTERNAL MEDICINE

## 2020-08-04 PROCEDURE — 99291 CRITICAL CARE FIRST HOUR: CPT

## 2020-08-04 PROCEDURE — 99245 OFF/OP CONSLTJ NEW/EST HI 55: CPT | Performed by: INTERNAL MEDICINE

## 2020-08-04 PROCEDURE — 71045 X-RAY EXAM CHEST 1 VIEW: CPT

## 2020-08-04 PROCEDURE — 85025 COMPLETE CBC W/AUTO DIFF WBC: CPT

## 2020-08-04 PROCEDURE — 93005 ELECTROCARDIOGRAM TRACING: CPT | Performed by: EMERGENCY MEDICINE

## 2020-08-04 PROCEDURE — 80053 COMPREHEN METABOLIC PANEL: CPT

## 2020-08-04 PROCEDURE — 84484 ASSAY OF TROPONIN QUANT: CPT

## 2020-08-04 ASSESSMENT — ENCOUNTER SYMPTOMS
CONSTIPATION: 0
SORE THROAT: 0
ABDOMINAL PAIN: 0
SHORTNESS OF BREATH: 0
BACK PAIN: 0
NAUSEA: 0
COUGH: 0
VOMITING: 0
DIARRHEA: 0

## 2020-08-04 NOTE — ED PROVIDER NOTES
Patient signed out to me from Dr. Mona Mcdowell at 1 PM.  At time of signout, [employer] evaluation is pending. Briefly, patient presents to the emergency department complaining of \"bradycardia. Patient a pacemaker placed in 2019 and states that his heart rate has been running low. He also reports \"just a little bit \"short of breath. Physical exam: No acute distress. Heart: Bradycardia. Normal S1-S2. Lungs: Clear to auscultation bilaterally    LABS  I have reviewed all labs for this visit.    Results for orders placed or performed during the hospital encounter of 08/04/20   CBC Auto Differential   Result Value Ref Range    WBC 9.8 4.0 - 11.0 K/uL    RBC 5.31 4.20 - 5.90 M/uL    Hemoglobin 16.1 13.5 - 17.5 g/dL    Hematocrit 47.4 40.5 - 52.5 %    MCV 89.2 80.0 - 100.0 fL    MCH 30.4 26.0 - 34.0 pg    MCHC 34.1 31.0 - 36.0 g/dL    RDW 13.9 12.4 - 15.4 %    Platelets 334 629 - 889 K/uL    MPV 8.6 5.0 - 10.5 fL    Neutrophils % 67.8 %    Lymphocytes % 19.0 %    Monocytes % 11.3 %    Eosinophils % 1.1 %    Basophils % 0.8 %    Neutrophils Absolute 6.7 1.7 - 7.7 K/uL    Lymphocytes Absolute 1.9 1.0 - 5.1 K/uL    Monocytes Absolute 1.1 0.0 - 1.3 K/uL    Eosinophils Absolute 0.1 0.0 - 0.6 K/uL    Basophils Absolute 0.1 0.0 - 0.2 K/uL   SPECIMEN REJECTION   Result Value Ref Range    Rejected Test CMP,TROP     Reason for Rejection see below    Comprehensive Metabolic Panel   Result Value Ref Range    Sodium 142 136 - 145 mmol/L    Potassium 4.0 3.5 - 5.1 mmol/L    Chloride 106 99 - 110 mmol/L    CO2 27 21 - 32 mmol/L    Anion Gap 9 3 - 16    Glucose 83 70 - 99 mg/dL    BUN 19 7 - 20 mg/dL    CREATININE 1.0 0.8 - 1.3 mg/dL    GFR Non-African American >60 >60    GFR African American >60 >60    Calcium 9.4 8.3 - 10.6 mg/dL    Total Protein 6.9 6.4 - 8.2 g/dL    Alb 3.8 3.4 - 5.0 g/dL    Albumin/Globulin Ratio 1.2 1.1 - 2.2    Total Bilirubin 0.4 0.0 - 1.0 mg/dL    Alkaline Phosphatase 58 40 - 129 U/L    ALT 20 10 - 40 U/L    AST 21 15 - 37 U/L    Globulin 3.1 g/dL   Troponin   Result Value Ref Range    Troponin <0.01 <0.01 ng/mL   Brain Natriuretic Peptide   Result Value Ref Range    Pro-BNP 98 0 - 124 pg/mL   EKG 12 Lead   Result Value Ref Range    Ventricular Rate 80 BPM    Atrial Rate 80 BPM    P-R Interval 202 ms    QRS Duration 140 ms    Q-T Interval 494 ms    QTc Calculation (Bazett) 569 ms    P Axis 51 degrees    R Axis 253 degrees    T Axis 2 degrees    Diagnosis       Electronic ventricular pacemakerWhen compared with ECG of 19-APR-2019 17:36,Vent. rate has increased BY  10 BPMConfirmed by KING GAMINO MD ([id number]) on 8/4/2020 11:35:31 AM           RADIOLOGY  X-RAYS:  I have reviewed radiologic plain film image(s). ALL OTHER NON-PLAIN FILM IMAGES SUCH AS CT, ULTRASOUND AND MRI HAVE BEEN READ BY THE RADIOLOGIST. XR CHEST PORTABLE   Final Result   Chronic low lung volumes with bibasilar airspace disease, most likely   atelectasis. Findings very similar to April 2019. No evidence of acute   process otherwise. Rechecks: Physical assessment performed. EP consult: Patient was seen and evaluated by EP Dr. Orin Stoddard. Patient was reset and it is recommended that patient be discharged home with follow-up on an outpatient basis. ED COURSE/MDM  Patient seen and evaluated. Old records reviewed. Labs and imaging reviewed and results discussed with patient. Patient was seen and evaluated in the emergency department. EP evaluated patient in the emergency department was able to reset the pacemaker. . Patient was reassessed as noted above . No acute pathology was noted and pt is safe for discharge home to follow up with EP on an outpatient basis. . Plan of care discussed with patient and family. Patient and family in agreement with plan. Patient was given scripts for the following medications. I counseled patient how to take these medications.    New Prescriptions    No medications on file       CLINICAL IMPRESSION  1. Disorder of cardiac pacemaker system, initial encounter    2. Light headed        Blood pressure (!) 125/58, pulse 75, temperature 97.2 °F (36.2 °C), temperature source Oral, resp. rate 18, height 5' 6\" (1.676 m), weight 220 lb (99.8 kg), SpO2 96 %. [facility] Brian Loving was discharged to home in stable condition.         Emmit Later, DO  08/04/20 1416

## 2020-08-04 NOTE — ED PROVIDER NOTES
[address] [facility]  ED  EMERGENCY DEPARTMENT ENCOUNTER      Pt Name: Amanda Alexander  MRN: [id number]  [place] 1950  Date of evaluation: 8/4/2020  Provider: Jus Craven MD    [address] [place] [address]       Chief Complaint   Patient presents with    Bradycardia     patient c/o low HR, pacemaker placed in 2019.  Shortness of Breath     \"just a little bit\"         HISTORY OF PRESENT ILLNESS   (Location/Symptom, Timing/Onset, Context/Setting, Quality, Duration, Modifying Factors, Severity)  Note limiting factors. Amanda Alexander is a 69 y.o. male who presents to the emergency department     Patient is a 59-year-old male with a past medical history of hypertension, hyperlipidemia, pacemaker in place who presents with lightheadedness and episode of bradycardia. Patient has an apple watch and notified him that his heart rate was down in the 40s. Patient states that this is abnormal due to his pacemaker. He reports that he has been feeling ill since 8:00 this morning noting that he feels lightheaded and just generally unwell. He did have some chest pressure but is not experiencing any chest pain currently. The history is provided by the patient. Nursing Notes were reviewed. REVIEW OF SYSTEMS    (2-9 systems for level 4, 10 or more for level 5)     Review of Systems   Constitutional: Negative for chills and fever. HENT: Negative for congestion and sore throat. Eyes: Negative for visual disturbance. Respiratory: Negative for cough and shortness of breath. Cardiovascular: Positive for chest pain. Gastrointestinal: Negative for abdominal pain, constipation, diarrhea, nausea and vomiting. Genitourinary: Negative for dysuria and hematuria. Musculoskeletal: Negative for back pain and neck pain. Skin: Negative for pallor and rash. Neurological: Positive for light-headedness. Negative for headaches. All other systems reviewed and are negative.       Except as noted above the rarely    Drug use: No    Sexual activity: Yes     Partners: Female   Lifestyle    Physical activity     Days per week: None     Minutes per session: None    Stress: None   Relationships    Social connections     Talks on phone: None     Gets together: None     Attends Voodoo service: None     Active member of club or organization: None     Attends meetings of clubs or organizations: None     Relationship status: None    Intimate partner violence     Fear of current or ex partner: None     Emotionally abused: None     Physically abused: None     Forced sexual activity: None   Other Topics Concern    None   Social History Narrative    None       SCREENINGS    Jacobo Coma Scale  Eye Opening: Spontaneous  Best Verbal Response: Oriented  Best Motor Response: Obeys commands  Centuria Coma Scale Score: 15          PHYSICAL EXAM    (up to 7 for level 4, 8 or more for level 5)     ED Triage Vitals   BP Temp Temp Source Pulse Resp SpO2 Height Weight   08/04/20 1045 08/04/20 1041 08/04/20 1041 08/04/20 1041 08/04/20 1041 08/04/20 1041 08/04/20 1041 08/04/20 1041   (!) 156/85 97.2 °F (36.2 °C) Oral 77 16 96 % 5' 6\" (1.676 m) 220 lb (99.8 kg)       Physical Exam  Vitals signs and nursing note reviewed. Constitutional:       General: He is not in acute distress. Appearance: Normal appearance. HENT:      Head: Normocephalic and atraumatic. Nose: Nose normal. No congestion. Mouth/Throat:      Mouth: Mucous membranes are moist.   Eyes:      Conjunctiva/sclera: Conjunctivae normal.   Neck:      Musculoskeletal: Normal range of motion and neck supple. Cardiovascular:      Rate and Rhythm: Bradycardia present. Rhythm irregular. Pulses: Normal pulses. Heart sounds: Normal heart sounds. Pulmonary:      Effort: Pulmonary effort is normal. No respiratory distress. Breath sounds: Normal breath sounds. Abdominal:      General: There is no distension. Palpations: Abdomen is soft. Tenderness: There is no abdominal tenderness. Musculoskeletal: Normal range of motion. General: No swelling or deformity. Skin:     General: Skin is warm and dry. Neurological:      General: No focal deficit present. Mental Status: He is alert and oriented to person, place, and time. DIAGNOSTIC RESULTS     EKG: All EKG's are interpreted by the Emergency Department Physician who either signs or Co-signs this chart in the absence of a cardiologist.    Pacemaker, rate 80, no STEMI, similar to previous EKG dated April 19, 2018    RADIOLOGY:     Interpretation per the Radiologist below, if available at the time of this note:    XR CHEST PORTABLE   Final Result   Chronic low lung volumes with bibasilar airspace disease, most likely   atelectasis. Findings very similar to April 2019. No evidence of acute   process otherwise. LABS:  Labs Reviewed   CBC WITH AUTO DIFFERENTIAL    Narrative:     Performed at:  [place] [address] [address]   Phone [phone]    Narrative:     Eliecer Warren tel. [phone],  Rejected Test Name/Called to: Devaughn Myers RN, 08/04/2020 11:37, by Camilo Garrett  Performed at:  [place] [address] [address]   Phone [phone]   COMPREHENSIVE METABOLIC PANEL    Narrative:     Performed at:  [place] [address] [address]   Phone [phone]   TROPONIN    Narrative:     Performed at:  [place] [address] [address]   Phone [phone] PEPTIDE    Narrative:     Performed at:  [place] [address] [place]   Phone [phone]       All other labs were within normal range or not returned as of this dictation.     EMERGENCY DEPARTMENT COURSE and

## 2020-08-04 NOTE — TELEPHONE ENCOUNTER
----- Message from Tori Burrows MD sent at 2/5/2018 10:31 AM EST -----  Contact: pt [phone]  Z carolin  ----- Message -----  From: Kalen Vidal  Sent: 2/5/2018   8:40 AM  To: Tori Burrows MD    Pt wife was in last week and saw Phil Aldridge, and was prescribed Z carolin. Wife has gotten much better. Pt now has same symptoms wife had, and was wondering if you could prescribe z carolin for him. I told him you might want to come in, and he said he didn't want to drive in the weather, but if he has to he will. If you are willing, please send to [facility].    Last 12-1-17  Next 3-6-18  [facility]  Surgery Precert & Billing Form:    DEMOGRAPHICS:                                                                                                       Patient Name:  Mi Cantu  Patient :  1953   Patient SS#:      Patient Phone:  [phone] (home)  Alt.  Patient Phone:    Patient Address:  [address] [place] [address]    PCP:  Jolene Neal MD  Insurance: [facility] MEDICARE    DIAGNOSIS & PROCEDURE:                                                                                      Diagnosis: M54.16, M43.16, M48.062  Operation: left L4-5 TX MICAH #2    SURGERY  INFORMATION  Date of Surgery:   2020  Location:   [facility]  Type:    OUTPATIENT  23 hour hold:  NO  Surgeon:          Sade Quinones MD  20     BILLING INFORMATION:                                                                                                Physician Procedure                                            CPT Codes        LEFT L4-5 TRANSFORAMINAL MICAH #2  [id number]                  PA, or Fellow Procedure                                      CPT Codes

## 2020-08-04 NOTE — ED NOTES
1059 - called jenni @ [facility] Cardio  Re:  Has pacemaker, symptomatic bradycardia, having 6 second pauses here in ED  1103 - Dr Kennedy Lares called back to speak with Dr Chikis Castillo  08/04/20 110

## 2020-08-04 NOTE — ED NOTES
Patient sitting on side of the bed to urinate per urinal. Wife remains at bedside.       Cory Guillen RN  08/04/20 2033

## 2020-08-04 NOTE — CONSULTS
Electrophysiology Consultation   Date: 8/4/2020  Admit Date:  8/4/2020  Reason for Consultation: Symptomatic bradycardia. Consult Requesting Physician: Pamela Garcia DO     Chief Complaint   Patient presents with    Bradycardia     patient c/o low HR, pacemaker placed in 2019.  Shortness of Breath     \"just a little bit\"     HPI:   Mr. Tammy Morillo is a pleasant 69year old male with a medical history significant for complete heart block status post dual chamber pacemaker with his bundle pacing, hypertension, hyperlipidemia, and moderate aortic valve insufficiency who presents from home with symptomatic bradycardia. According to patient he was in his usual state of health until this morning when he woke up with lightheadedness and some dizziness that were associated with slower heartbeats. States that he felt as he did prior to his pacemaker implantation. He denies syncope. He denies chest pain or shortness of breath. He denies heart failure symptoms. Patient denies fevers, chest pain, orthopnea, PND, lower extremity edema, abdominal swelling, shortness of breath, dyspnea on exertion, chills, visual changes, headaches, sore throat, cough, abdominal pain, nausea, vomiting, bleeding, bruising, dysuria, muscle/joint pain, confusion, depression, anxiety, skin lesions, etc.    Emergency Room/Hospital Course:  Patient was evaluated emergency room. Telemetry showed pacing spikes without R wave. His CMP was reassuring. Troponins were negative. His CBC was reassuring. His EKG was reassuring. Chest radiograph was stable with leads appearing to be in stable position however this was only a portable image.     Past Medical History:   Diagnosis Date    Anxiety 12/21/2012    Hyperlipidemia     Hypertension     Hypertrophy of prostate without urinary obstruction     Mild mitral regurgitation 3/6/2018    Moderate aortic insufficiency 3/6/2018        Past Surgical History:   Procedure Laterality Date    CARDIAC PACEMAKER PLACEMENT  2019    CATARACT REMOVAL WITH IMPLANT Left 2019    COLONOSCOPY  2008    INTRACAPSULAR CATARACT EXTRACTION Left 2019    PHACO EMULSIFICATION OF CATARACT WITH  INTRAOCULAR LENS IMPLANT performed by Carolyn Mac MD at [facility] 15 Left        No Known Allergies    Social History:  Reviewed. reports that he has never smoked. He has never used smokeless tobacco. He reports current alcohol use. He reports that he does not use drugs. Family History:  Reviewed. family history includes Cancer in his father; Cancer (age of onset: 62) in his brother; No Known Problems in his sister; Other in his mother. No premature CAD. Review of System:  All other systems reviewed except for that noted above. Pertinent negatives and positives are:     · General: negative for fever, chills   · Ophthalmic ROS: negative for - eye pain or loss of vision  · ENT ROS: negative for - headaches, sore throat   · Respiratory: negative for - cough, sputum  · Cardiovascular: Reviewed in HPI  · Gastrointestinal: negative for - abdominal pain, diarrhea, N/V  · Hematology: negative for - bleeding, blood clots, bruising or jaundice  · Genito-Urinary:  negative for - Dysuria or incontinence  · Musculoskeletal: negative for - Joint swelling, muscle pain  · Neurological: negative for - confusion, dizziness, headaches   · Psychiatric: No anxiety, no depression. · Dermatological: negative for - rash    Physical Examination:  Vitals:    20 1341   BP:    Pulse: 75   Resp: 18   Temp:    SpO2: 96%      No intake or output data in the 24 hours ending 20 1403  No intake/output data recorded.    Wt Readings from Last 3 Encounters:   20 220 lb (99.8 kg)   20 225 lb (102.1 kg)   19 222 lb (100.7 kg)     Temp  Av.2 °F (36.2 °C)  Min: 97.2 °F (36.2 °C)  Max: 97.2 °F (36.2 °C)  Pulse  Av.1  Min: 42  Max: 77  BP  Min: 112/54  Max: 156/85  SpO2  Av.9 % Min: 94 %  Max: 97 %    · Telemetry: Sinus rhythm with missed beats. Complete heart block mainly. · Constitutional: Alert. Oriented to person, place, and time. No distress. · Head: Normocephalic and atraumatic. · Neck: Neck supple. No lymphadenopathy. No rigidity. No JVD present. · Cardiovascular: Normal rate, regular rhythm. Normal S1&S2. · Pulmonary/Chest: Bilateral respiratory sounds present. No respiratory accessory muscle use. No wheezes, No rhonchi. · Abdominal: Soft. Normal bowel sounds present. No distension, No tenderness. No splenomegaly. No hernia. · Musculoskeletal: No tenderness. No edema    · Neurological: Alert and oriented. Cranial nerve II-XII grossly intact. · Skin: Skin is warm and dry. No rash, lesions, ulcerations noted. · Psychiatric: No anxiety nor agitation. Labs:  Reviewed. Recent Labs     08/04/20  1144      K 4.0      CO2 27   BUN 19   CREATININE 1.0     Recent Labs     08/04/20  1055   WBC 9.8   HGB 16.1   HCT 47.4   MCV 89.2        Lab Results   Component Value Date    TROPONINI <0.01 08/04/2020     No results found for: BNP  No results found for: PROTIME, INR  Lab Results   Component Value Date    CHOL 133 10/30/2019    HDL 50 10/30/2019    TRIG 88 10/30/2019       Diagnostic and imaging results reviewed. ECG: Sinus rhythm with LV pacing (RBBB, likely His). Echo: 04/20/2020   Summary   Normal left ventricular systolic function with ejection fraction of 55-60%. The study is inadequate to exclude regional wall motion abnormalities. Systolic pulmonic artery pressure (SPAP) is normal estimated at 24 mmHg   (Right atrial pressure of 3 mmHg). Compared to previous study from 12- no changes noted. I independently reviewed the ECG and telemetry. Scheduled Meds:  Continuous Infusions:  PRN Meds:.      Assessment:   Patient Active Problem List    Diagnosis Date Noted    LBBB (left bundle branch block) 07/26/2019    Pacemaker 04/21/2019    Heart block 04/19/2019    Moderate aortic insufficiency 03/06/2018    Mild mitral regurgitation 03/06/2018    Anxiety 12/21/2012    Hypertension     Hyperlipidemia     Hypertrophy of prostate without urinary obstruction       There are no active hospital problems to display for this patient. Mr. Elizabeth Horne is a pleasant 69year old male with a medical history significant for complete heart block status post dual chamber pacemaker with his bundle pacing, hypertension, hyperlipidemia, and moderate aortic valve insufficiency who presents from home with symptomatic bradycardia. Problem List:  1. Complete heart block post DC pacemaker with His bundle pacing. Assessment and Plan:  1. Complete heart block post DC pacemaker with His bundle pacing. Patient presents from home with symptomatic bradycardia. His His bundle lead pacing threshold has increased and he was intermittently capturing with his device at current output around 2.75V at 1 ms pulse width. This is above initial implant threshold. After discussing further with patient we decided to increase his pacing outputs of 5 V at 1 ms pulse width. This decrease his battery life from 5 years to approximately 3.7 years. Patient understands that there is increased risk with generator changes however he is comfortable with the increase in output given his symptoms. Discussed whether or not he he wanted to see Dr. Fay Landry sooner rather than later and patient decided to keep his appointment in October. Patient will follow-up with Dr. Fay Landry in October.  - Pacing output increased to 5 V at 1 ms PW.   - Follow up with Dr. Fay Landry as an outpatient in 10/2020.  - Call or return if symptoms return or worsen. Patient understands and agrees with plan. - Ok to discharge from EP standpoint. Thank you for allowing me to participate in the care of Zuleyma Fatima .  If you have any questions/comments, please do not hesitate to contact .    Yareli Hernandez MD  Cardiac Electrophysiology  [address] [facility]  [phone] [facility]

## 2020-08-04 NOTE — PROGRESS NOTES
[facility] ER-rep check. Mr Odin Hendrix has intermittent loss of RV capture at 2.5 V( setting). Dr Marquez Ha placed a HIS lead back in April 2019. Dr Marilee Erwin had me put his outputs to [facility]-ER and hes seeing Lisandra Sampson in October. See PACEART report under Cardiology tab.

## 2020-08-04 NOTE — ED NOTES
Dr Neftaly Harrington in ED with Alesha Schwarzis from [address] [facility] at  bedside     Agustinia Clink  08/04/20 5839

## 2020-08-04 NOTE — ED NOTES
Patient noted to have about 6 second pause, HR 33. Patient moved from room 15 to room 1. Placed on cardiac pads. Dr. Greta Avila at bedside.       Ksenia Ordoñez RN  08/04/20 2092

## 2020-08-04 NOTE — ED NOTES
[employer] rep Nelly Zaldivar is at pt bedside at this time. Pooja Leon at [place] [facility] notified.  She will notify Dr Marilee Cornell  08/04/20 8824

## 2020-08-04 NOTE — ED NOTES
Dr Hortensia Ortiz called to speak with Dr Bennie Malcolm at this time.       Kamaljit Gomez  08/04/20 3146

## 2020-08-04 NOTE — PLAN OF CARE
Pt seen in ER. His pacing threshold increased. Increased to almost twice threshold. Life now 3.7 years. Charlene Malik for discharge. Follow up as scheduled with Dr. Rony Gurrola.       Renu Rodriguez MD  Cardiac Electrophysiology  [address] [facility]  [phone] [facility]

## 2020-08-04 NOTE — ED NOTES
Spoke to Fazal Woo in Cardiology who states per Dr. Missouri Meigs the [employer] rep is on the way, we are going to have him check the pacemaker first and see if it can be fixed, if not then the patient will be admitted\". Dr. Ginger Gale, patient and family aware.        Lila Saavedra, RN  08/04/20 7092

## 2020-08-04 NOTE — ED NOTES
Pt's wife Aundra Hernández) arrives to ED and requests to visit . When she is educated regarding the visitor policy and that since the patient endorsed shortness of breath at initial check in he will not be permitted to have visitors, she states 'that is bull shit.' She is informed that this is the current policy and is encouraged to review the sheet provided. She appears angry in the vestibule. Her phone number is taken for patient update purposes and placed in the comment box.       Maddy Shelby RN  08/04/20 5668

## 2020-08-13 ENCOUNTER — OFFICE VISIT (OUTPATIENT)
Dept: CARDIOLOGY CLINIC | Age: 70
End: 2020-08-13
Payer: COMMERCIAL

## 2020-08-13 VITALS
DIASTOLIC BLOOD PRESSURE: 60 MMHG | WEIGHT: 226 LBS | BODY MASS INDEX: 36.32 KG/M2 | SYSTOLIC BLOOD PRESSURE: 132 MMHG | HEART RATE: 79 BPM | OXYGEN SATURATION: 94 % | HEIGHT: 66 IN

## 2020-08-13 PROBLEM — I44.2 COMPLETE HEART BLOCK (HCC): Status: ACTIVE | Noted: 2020-08-13

## 2020-08-13 PROCEDURE — 93000 ELECTROCARDIOGRAM COMPLETE: CPT | Performed by: NURSE PRACTITIONER

## 2020-08-13 PROCEDURE — 99214 OFFICE O/P EST MOD 30 MIN: CPT | Performed by: NURSE PRACTITIONER

## 2020-08-13 NOTE — PROGRESS NOTES
[address]   Electrophysiology Note              Date:  August 13, 2020  Patient name: Kehinde Tse  YOB: 1950    Primary Care physician: Leslie Henao MD    HISTORY OF PRESENT ILLNESS: The patient is a 69 y.o.  male with a history of complete heart block, alternating BBB, HTN, HLD, and AI. He was admitted in 4/2019 with dizziness. Developed 2:1 AVB and CHB with alternating BBB. On 4/19/2019 he had a dual chamber pacemaker implanted at the HIS bundle. Device checks had been normal. In 8/2020 he went to the ER with dizziness. Device check showed increased HIS lead threshold with intermittent capture and output was increased to 5V at 1ms. Today he is being seen for complete heart block. Device check shows ongoing need for HIS lead output of 5V @ 1ms and draining battery), 2% AP, 100% , and no arrhythmias. EKG shows AS- with a HR of 73. He currently denies chest pain, palpitations, shortness of breath, and dizziness. Past Medical History:   has a past medical history of Anxiety, Hyperlipidemia, Hypertension, Hypertrophy of prostate without urinary obstruction, Mild mitral regurgitation, and Moderate aortic insufficiency. Past Surgical History:   has a past surgical history that includes Colonoscopy (4/21/2008); vitrectomy (Left, 2018); Cataract removal with implant (Left, 05/01/2019); Intracapsular cataract extraction (Left, 5/1/2019); and Cardiac pacemaker placement (04/19/2019). Home Medications:    Prior to Admission medications    Medication Sig Start Date End Date Taking?  Authorizing Provider   escitalopram (LEXAPRO) 10 MG tablet Take 1 tablet by mouth daily 7/29/20  Yes Merritt Ward MD   pravastatin (PRAVACHOL) 80 MG tablet TAKE ONE TABLET DAILY AT BEDTIME 7/29/20  Yes Merritt Ward MD   telmisartan-hydrochlorothiazide (MICARDIS HCT) 80-25 MG per tablet TAKE ONE TABLET DAILY 7/29/20  Yes Merritt Ward MD venlafaxine (EFFEXOR XR) 75 MG extended release capsule Take one tablet daily  Patient not taking: Reported on 8/13/2020 7/29/20   Madie Bill MD       Allergies:  Patient has no known allergies. Social History:   reports that he has never smoked. He has never used smokeless tobacco. He reports current alcohol use. He reports that he does not use drugs. Family History: family history includes Cancer in his father; Cancer (age of onset: 62) in his brother; No Known Problems in his sister; Other in his mother. Review of Systems   Constitutional: Negative. HENT: Negative. Eyes: Negative. Respiratory: Negative. Cardiovascular: see HPI  Gastrointestinal: Negative. Genitourinary: Negative. Musculoskeletal: Negative. Skin: Negative. Neurological: Negative. Hematological: Negative. Psychiatric/Behavioral: Negative. PHYSICAL EXAM:    Vital signs:    /60   Pulse 79   Ht 5' 6\" (1.676 m)   Wt 226 lb (102.5 kg)   SpO2 94%   BMI 36.48 kg/m²      Constitutional and general appearance: alert, cooperative, no distress and appears stated age  HEENT: PERRL, no cervical lymphadenopathy. No masses palpable.  Normal oral mucosa  Respiratory:  · Normal excursion and expansion without use of accessory muscles  · Resp auscultation: Normal breath sounds without wheezing, rhonchi, and rales  Cardiovascular:  · The apical impulse is not displaced  · Heart tones are crisp and normal. regular S1 and S2.  · Jugular venous pulsation Normal  · The carotid upstroke is normal in amplitude and contour without delay or bruit  · Peripheral pulses are symmetrical and full   Abdomen:  · No masses or tenderness  · Bowel sounds present  Extremities:  ·  No cyanosis or clubbing  ·  No lower extremity edema  ·  Skin: warm and dry; left upper chest incision is closed and healed   Neurological:  · Alert and oriented  · Moves all extremities well  · No abnormalities of mood, affect, memory, mentation, or behavior are noted    DATA:    ECG 8/13/2020:  AS- with a HR of 73    Echo 4/19/2019:  Normal left ventricular systolic function with ejection fraction of 55-60%.   The study is inadequate to exclude regional wall motion abnormalities.   Systolic pulmonic artery pressure (SPAP) is normal estimated at 24 mmHg   (Right atrial pressure of 3 mmHg).   Compared to previous study from 12- no changes noted. Echo 12/20/2017:  Normal left ventricle systolic function with an estimated ejection fraction of 55%.   No regional wall motion abnormalities are seen.   Diastolic filling parameters suggest normal diastolic filing pressure.   Mild mitral regurgitation.   Individual aortic valve leaflets are not clearly visualized, aortic valve appears sclerotic.   Moderate aortic regurgitation.   Systolic pulmonary artery pressure (SPAP) is normal and estimated at 15 mmHg   (RA pressure of 3 mmHg). All labs and testing reviewed. CARDIOLOGY LABS:   CBC: No results for input(s): WBC, HGB, HCT, PLT in the last 72 hours. BMP: No results for input(s): NA, K, CO2, BUN, CREATININE, LABGLOM, GLUCOSE in the last 72 hours. PT/INR: No results for input(s): PROTIME, INR in the last 72 hours. APTT:No results for input(s): APTT in the last 72 hours. FASTING LIPID PANEL:  Lab Results   Component Value Date    HDL 50 10/30/2019    LDLCALC 65 10/30/2019    TRIG 88 10/30/2019     LIVER PROFILE:No results for input(s): AST, ALT, ALB in the last 72 hours. Assessment:   1. Complete heart block: ongoing    -s/p dual chamber pacemaker implant  In 5/2019 (implanted at the HIS bundle)   -HIS lead with intermittent capture 8/2020 (patient was symptomatic) and required increased output   -device check today shows ongoing need for HIS lead output of 5V @ 1ms and draining battery), 2% AP, 100% , and no arrhythmias  2. Alternating BBB  3. HTN: controlled   4. HLD  5. Aortic insufficiency: moderate    Plan:   1.  Continue telmisartan and HCTZ  2. Annual BMP (due 8/2021)   3. Device to be reinterrogated/reprogrammed while Dr. Chris Velez is in office (would like to check underlying and decrease output to 3V)    Procedural plan/device check reviewed per Dr. Chris Velez.     Connor Sepulveda, DANIEL-CNP  [facility]  [phone]

## 2020-08-17 ENCOUNTER — NURSE ONLY (OUTPATIENT)
Dept: CARDIOLOGY CLINIC | Age: 70
End: 2020-08-17
Payer: COMMERCIAL

## 2020-08-17 ENCOUNTER — OFFICE VISIT (OUTPATIENT)
Dept: CARDIOLOGY CLINIC | Age: 70
End: 2020-08-17
Payer: COMMERCIAL

## 2020-08-17 VITALS
WEIGHT: 225.31 LBS | OXYGEN SATURATION: 94 % | HEART RATE: 90 BPM | HEIGHT: 66 IN | BODY MASS INDEX: 36.21 KG/M2 | SYSTOLIC BLOOD PRESSURE: 158 MMHG | DIASTOLIC BLOOD PRESSURE: 62 MMHG

## 2020-08-17 PROCEDURE — 99214 OFFICE O/P EST MOD 30 MIN: CPT | Performed by: INTERNAL MEDICINE

## 2020-08-17 PROCEDURE — 93280 PM DEVICE PROGR EVAL DUAL: CPT | Performed by: INTERNAL MEDICINE

## 2020-08-17 NOTE — PATIENT INSTRUCTIONS
Plan:  1. Remote Device Checks every 3 months  2. Continue current medications as prescribed  3.  Follow up with me in November as planned

## 2020-08-17 NOTE — PROGRESS NOTES
[facility]   Cardiac Consultation    Primary Care Provider:  Rei Pepe MD     Chief Complaint:   Chief Complaint   Patient presents with    1 Month Follow-Up    Other     complete heart block       HPI:  Fouzia Farias is a 69 y.o. male with PMH of HTN, HLD, and moderate AI. 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 S/p dual chamber PPM (4/19/19) with RV lead implanted at the His bundle. EKG on 5/23/19 showed AS- with HR of 84, consistent with non selective His bundle pacing. Echo (4/20/19) showed EF of 55-60%. Device interrogation 11/19/19 showed normally functioning PPM.  He reported he felt symptomatic improvement since his PPM implant. He is very active with good exercise tolerance. On 8/4/20 he presented to the ED for bradycardia and dizziness. His device check at that time showed increased His lead threshold with intermittent capture and output was increased to 5V at 1ms. His device check on 8/13/20 showed ongoing need for His lead output of 5V @ 1ms and draining battery, AP 2% and  100% with no arrhythmias. His device check today 8/17/20 demonstrates RV threshold 1.0v at 1.0 miliseconds with unipolar pacing,  1.5v at 1.0 msec with bipolar pacing. Today he reports he is feeling well from a cardiac standpoint. He reports he is no longer having the dizziness or bradycardia. He reports he is taking his medications as prescribed and is tolerating them well. Patient denies edema, chest pain, sob, palpitations, dizziness or syncope. Past Medical History:   has a past medical history of Anxiety, Hyperlipidemia, Hypertension, Hypertrophy of prostate without urinary obstruction, Mild mitral regurgitation, and Moderate aortic insufficiency. Surgical History:   has a past surgical history that includes Colonoscopy (4/21/2008); vitrectomy (Left, 2018); Cataract removal with implant (Left, 05/01/2019);  Intracapsular cataract extraction (Left, 2019); and Cardiac pacemaker placement (2019). Social History:   reports that he has never smoked. He has never used smokeless tobacco. He reports current alcohol use. He reports that he does not use drugs. Family History:  family history includes Cancer in his father; Cancer (age of onset: 62) in his brother; No Known Problems in his sister; Other in his mother. Home Medications:  Outpatient Encounter Medications as of 2020   Medication Sig Dispense Refill    escitalopram (LEXAPRO) 10 MG tablet Take 1 tablet by mouth daily 30 tablet 0    pravastatin (PRAVACHOL) 80 MG tablet TAKE ONE TABLET DAILY AT BEDTIME 90 tablet 3    telmisartan-hydrochlorothiazide (MICARDIS HCT) 80-25 MG per tablet TAKE ONE TABLET DAILY 90 tablet 3    venlafaxine (EFFEXOR XR) 75 MG extended release capsule Take one tablet daily (Patient not taking: Reported on 2020) 90 capsule 3     No facility-administered encounter medications on file as of 2020. Allergies:  Patient has no known allergies. Review of Systems   Constitutional: Negative. HENT: Negative. Eyes: Negative. Respiratory: Negative. Cardiovascular: Negative. Gastrointestinal: Negative. Genitourinary: Negative. Musculoskeletal: Negative. Skin: Negative. Neurological: Negative. Hematological: Negative. Psychiatric/Behavioral: Negative. BP (!) 158/62   Pulse 90   Ht 5' 6\" (1.676 m)   Wt 225 lb 5 oz (102.2 kg)   SpO2 94%   BMI 36.37 kg/m²     DATA:  EC20: V-Paced with HR 80 BPM    ECHO: 19: Summary   Normal left ventricular systolic function with ejection fraction of 55-60%. The study is inadequate to exclude regional wall motion abnormalities. Systolic pulmonic artery pressure (SPAP) is normal estimated at 24 mmHg   (Right atrial pressure of 3 mmHg). Compared to previous study from 2017 no changes noted.      Objective:  Physical Exam   Constitutional: He is oriented to person, place, and time. He appears well-developed and well-nourished. HENT:   Head: Normocephalic and atraumatic. Eyes: Pupils are equal, round, and reactive to light. Neck: Normal range of motion. Cardiovascular: Normal rate, regular rhythm and normal heart sounds. Pulmonary/Chest: Effort normal and breath sounds normal.   Abdominal: Soft. No tenderness. Musculoskeletal: Normal range of motion. He exhibits no edema. Neurological: He is alert and oriented to person, place, and time. Skin: Skin is warm and dry. Psychiatric: He has a normal mood and affect. Assessment:  1. CHB with alternating BBB  2. Dual chamber PPM 4/19/19 selective HIS capture < 2.75v. His pacing threshold in bipolar mode is 1.5 V at 1 ms. Since the pacing threshold in unipolar mode was lower, we reprogrammed him to unipolar pacing bipolar sensing. With the output programmed to 2.5 V, he has selective His bundle pacing with right bundle branch block. This change increase the battery life from 14 months to 2.7 years. Plan:  1. RV output 2.5v at 1.0 msec unipolar   2. Remote Device Checks every 3 months  3. Continue current medications as prescribed  4. Follow up with me 11/23/20    This note was scribed in the presence of Wanda Breaux MD by Frankie Duane. Chaim Johnson, Dr. Wanda Breaux, personally performed the services described in this documentation as scribed by Frankie Duane. Jensen Felder RN  in my presence, and it is both accurate and complete.       Wanda Breaux M.D.

## 2020-08-18 PROCEDURE — 93000 ELECTROCARDIOGRAM COMPLETE: CPT | Performed by: INTERNAL MEDICINE

## 2020-08-18 NOTE — PROGRESS NOTES
Device interrogation by company representative. See interrogation for further details. Patient to see Dr. Chris Velez today. His device check today 8/17/20 demonstrates RV threshold 1.0v at 1.0 miliseconds with unipolar pacing,  1.5v at 1.0 msec with bipolar pacing. Dx CHB w/ alternating BBB. Dual chamber PPM 4/19/19 selective HIS capture < 2.75v. His pacing threshold in bipolar mode is 1.5 V at 1 ms. Since the pacing threshold in unipolar mode was lower, we reprogrammed him to unipolar pacing bipolar sensing. With the output programmed to 2.5 V, he has selective His bundle pacing with right bundle branch block. This change increase the battery life from 14 months to 2.7 years. Follow up in 3 months via carelink/in office. Report was not synced to paceart and will need scanned into epic.

## 2020-11-02 ENCOUNTER — OFFICE VISIT (OUTPATIENT)
Dept: INTERNAL MEDICINE CLINIC | Age: 70
End: 2020-11-02

## 2020-11-02 VITALS
TEMPERATURE: 98 F | BODY MASS INDEX: 36.48 KG/M2 | HEART RATE: 78 BPM | WEIGHT: 227 LBS | SYSTOLIC BLOOD PRESSURE: 138 MMHG | HEIGHT: 66 IN | DIASTOLIC BLOOD PRESSURE: 80 MMHG | RESPIRATION RATE: 12 BRPM

## 2020-11-02 DIAGNOSIS — I10 ESSENTIAL HYPERTENSION: ICD-10-CM

## 2020-11-02 DIAGNOSIS — N40.0 HYPERTROPHY OF PROSTATE WITHOUT URINARY OBSTRUCTION: ICD-10-CM

## 2020-11-02 LAB
CHOLESTEROL, TOTAL: 141 MG/DL (ref 0–199)
HDLC SERPL-MCNC: 50 MG/DL (ref 40–60)
LDL CHOLESTEROL CALCULATED: 50 MG/DL
TRIGL SERPL-MCNC: 207 MG/DL (ref 0–150)
VLDLC SERPL CALC-MCNC: 41 MG/DL

## 2020-11-02 PROCEDURE — 90662 IIV NO PRSV INCREASED AG IM: CPT | Performed by: INTERNAL MEDICINE

## 2020-11-02 PROCEDURE — 99397 PER PM REEVAL EST PAT 65+ YR: CPT | Performed by: INTERNAL MEDICINE

## 2020-11-02 PROCEDURE — 90471 IMMUNIZATION ADMIN: CPT | Performed by: INTERNAL MEDICINE

## 2020-11-02 RX ORDER — ESCITALOPRAM OXALATE 10 MG/1
10 TABLET ORAL DAILY
Qty: 90 TABLET | Refills: 3 | Status: SHIPPED | OUTPATIENT
Start: 2020-11-02 | End: 2022-09-20

## 2020-11-02 ASSESSMENT — ENCOUNTER SYMPTOMS
SHORTNESS OF BREATH: 0
WHEEZING: 0
RHINORRHEA: 0
NAUSEA: 0
VOMITING: 0
ABDOMINAL PAIN: 0
BACK PAIN: 0
COUGH: 0

## 2020-11-02 ASSESSMENT — PATIENT HEALTH QUESTIONNAIRE - PHQ9
1. LITTLE INTEREST OR PLEASURE IN DOING THINGS: 0
SUM OF ALL RESPONSES TO PHQ QUESTIONS 1-9: 0
SUM OF ALL RESPONSES TO PHQ QUESTIONS 1-9: 0
SUM OF ALL RESPONSES TO PHQ9 QUESTIONS 1 & 2: 0
2. FEELING DOWN, DEPRESSED OR HOPELESS: 0
SUM OF ALL RESPONSES TO PHQ QUESTIONS 1-9: 0

## 2020-11-02 NOTE — PROGRESS NOTES
Subjective:      Patient ID: Shawna Najera is a 69 y.o. . HPI     Patient is here for an annual physical.  Patient is here for follow up of hypertension and hyperlipidemia. He had an ER visit for some bradycardia and dyspnea. Pacemaker was re programmed. He has history of complete heart block. Patient's BP is controlled on current medications. No chest pain or dyspnea. His cholesterol is at goal. No myalgia. His anxiety is controlled. He takes medication. He has moderate AI and mild MR. No changes. His vision is stable. He has had a prior vitrectomy. He has a family history of prostate cancer. Review of Systems   Constitutional: Negative for activity change and appetite change. HENT: Negative for postnasal drip and rhinorrhea. Respiratory: Negative for cough, shortness of breath and wheezing. Cardiovascular: Negative for chest pain, palpitations and leg swelling. Gastrointestinal: Negative for abdominal pain, nausea and vomiting. Genitourinary: Negative for difficulty urinating and frequency. Musculoskeletal: Negative for back pain and joint swelling. Skin: Negative for rash. Neurological: Negative for light-headedness. Psychiatric/Behavioral: Negative for sleep disturbance. Current Outpatient Medications   Medication Sig Dispense Refill    escitalopram (LEXAPRO) 10 MG tablet Take 1 tablet by mouth daily 30 tablet 0    venlafaxine (EFFEXOR XR) 75 MG extended release capsule Take one tablet daily (Patient not taking: Reported on 8/13/2020) 90 capsule 3    pravastatin (PRAVACHOL) 80 MG tablet TAKE ONE TABLET DAILY AT BEDTIME 90 tablet 3    telmisartan-hydrochlorothiazide (MICARDIS HCT) 80-25 MG per tablet TAKE ONE TABLET DAILY 90 tablet 3     No current facility-administered medications for this visit.        Past Medical History:   Diagnosis Date    Anxiety 12/21/2012    Hyperlipidemia     Hypertension     Hypertrophy of prostate without urinary obstruction     Mild mitral regurgitation 3/6/2018    Moderate aortic insufficiency 3/6/2018       Past Surgical History:   Procedure Laterality Date    CARDIAC PACEMAKER PLACEMENT  04/19/2019    CATARACT REMOVAL WITH IMPLANT Left 05/01/2019    COLONOSCOPY  4/21/2008    INTRACAPSULAR CATARACT EXTRACTION Left 5/1/2019    PHACO EMULSIFICATION OF CATARACT WITH  INTRAOCULAR LENS IMPLANT performed by Evelin Almeida MD at [address] VITRECTOMY Left 2018       Family History   Problem Relation Age of Onset    Cancer Brother 62        prostate cancer    Other Mother         Lung fibrosis    Cancer Father         Prostate cancer    No Known Problems Sister        Social History     Tobacco Use    Smoking status: Never Smoker    Smokeless tobacco: Never Used   Substance Use Topics    Alcohol use: Yes     Comment: rarely    Drug use: No           /80 (Site: Left Upper Arm, Position: Sitting, Cuff Size: Large Adult)   Pulse 78   Temp 98 °F (36.7 °C)   Resp 12   Ht 5' 6\" (1.676 m)   Wt 227 lb (103 kg)   BMI 36.64 kg/m²      Objective:   Physical Exam   Constitutional: He appears well-developed and well-nourished. HENT:   Head: Normocephalic and atraumatic. Eyes: Pupils are equal, round, and reactive to light. Conjunctivae and EOM are normal. No scleral icterus. Neck: Normal range of motion. Neck supple. No thyromegaly present. Cardiovascular: Normal rate and regular rhythm. No murmur heard. Pulmonary/Chest: Effort normal. He has no decreased breath sounds. He has no wheezes. Abdominal: Soft. He exhibits no mass. There is no abdominal tenderness. There is no rebound. Musculoskeletal:         General: No edema. Lymphadenopathy:     He has no cervical adenopathy.      ECHO DONE ON 12/20/2017  Conclusions      Summary   Normal left ventricle systolic function with an estimated ejection fraction   of 55%.   No regional wall motion abnormalities are seen.   Diastolic filling parameters suggest normal diastolic filing pressure.   Mild mitral regurgitation.   Individual aortic valve leaflets are not clearly visualized, aortic valve   appears sclerotic.   Moderate aortic regurgitation.   Systolic pulmonary artery pressure (SPAP) is normal and estimated at 15 mmHg   (RA pressure of 3 mmHg). Assessment:          Diagnosis Orders   1. Routine general medical examination at a health care facility     2. Essential hypertension  Lipid Panel   3. Hyperlipidemia, unspecified hyperlipidemia type     4. Moderate aortic insufficiency     5. Mild mitral regurgitation     6. Pacemaker     7. Anxiety     8. Hypertrophy of prostate without urinary obstruction  PSA, Prostatic Specific Antigen           Plan:      Decrease calorie intake. Annual exam.  Hypertension:  Well controlled. Hyperlipidemia:  At goal.   Anxiety: well controlled on Celexa. Mild MR and Moderate AI stable. AI not seen on last ECHO. CHB has pacemaker. BPH stable. Check PSA. Decrease calorie intake. Exercise,weight loss recommended. The current medical regimen is effective;  continue present plan and medications. See orders.

## 2020-11-03 LAB — PROSTATE SPECIFIC ANTIGEN: 2.43 NG/ML (ref 0–4)

## 2020-11-10 ENCOUNTER — TELEPHONE (OUTPATIENT)
Dept: INTERNAL MEDICINE CLINIC | Age: 70
End: 2020-11-10

## 2020-11-10 NOTE — TELEPHONE ENCOUNTER
----- Message from Rowena Cheung MD sent at 11/10/2020  2:44 PM EST -----  Flu clinic  ----- Message -----  From: Lani Millan  Sent: 11/10/2020  11:56 AM EST  To: Rowena Cheung MD    Granddaughter just tested positive for covid, he has been in contact, wants a covid test.  Phone number is [phone]

## 2020-11-12 ENCOUNTER — OFFICE VISIT (OUTPATIENT)
Dept: PRIMARY CARE CLINIC | Age: 70
End: 2020-11-12
Payer: COMMERCIAL

## 2020-11-12 PROCEDURE — 99211 OFF/OP EST MAY X REQ PHY/QHP: CPT | Performed by: NURSE PRACTITIONER

## 2020-11-12 NOTE — PATIENT INSTRUCTIONS

## 2020-11-14 LAB — SARS-COV-2, NAA: NOT DETECTED

## 2020-11-16 ENCOUNTER — TELEPHONE (OUTPATIENT)
Dept: INTERNAL MEDICINE CLINIC | Age: 70
End: 2020-11-16

## 2020-11-16 NOTE — TELEPHONE ENCOUNTER
----- Message from Rhett Goncalves MD sent at 11/16/2020 12:43 PM EST -----  ok  ----- Message -----  From: Katherin Lucio  Sent: 11/16/2020   9:03 AM EST  To: Rhett Goncalves MD    Pt's covid test was negative.   Needing return to work note faxed to his work [phone]

## 2020-11-19 ENCOUNTER — TELEPHONE (OUTPATIENT)
Dept: CARDIOLOGY CLINIC | Age: 70
End: 2020-11-19

## 2020-11-19 NOTE — TELEPHONE ENCOUNTER
Spoke with patient re: appointment and device check on 11/23/2020. Appointment cancelled due to increased Covid numbers. Patient reports he is doing well and does not have any cardiac complaints at this time. He reports he does not need any refills. Patient will call back later for new appointment with IVANA or TYRA next available. Will need a device check at that time.

## 2021-01-07 ENCOUNTER — NURSE ONLY (OUTPATIENT)
Dept: CARDIOLOGY CLINIC | Age: 71
End: 2021-01-07

## 2021-01-07 DIAGNOSIS — Z95.0 PACEMAKER: ICD-10-CM

## 2021-01-07 NOTE — PROGRESS NOTES
Pt scheduled to see npbb 1/8/2021. Mr Ashwin Desai has intermittent loss of RV capture at 2.5 V( setting). Dr Esau Castle placed a HIS lead back in April 2019. Ov jmb 8/17/20 and device check 8/17/20 demonstrates RV threshold 1.0v at 1.0 miliseconds with unipolar pacing,  1.5v at 1.0 msec with bipolar pacing. Dual chamber PPM 4/19/19 selective HIS capture < 2.75v.  His pacing threshold in bipolar mode is 1.5 V at 1 ms.  Since the pacing threshold in unipolar mode was lower, we reprogrammed him to unipolar pacing bipolar sensing.  With the output programmed to 2.5 V, he has selective His bundle pacing with right bundle branch block.  This change increased the battery life. Ov jmb 11/23/20 was cancelled d/t increased covid numbers. Carelink transmission shows normal sensing and pacing function. EP physician will review. See interrogation under cardiology tab in the [address] [place] [address] [address] field for more details. No  arrhythmias recorded.   Pacing (% of Time Since 18-Aug-2020)   99.9% (MVP Off)  AP 1.6%  Follow up in 3 months via carelink.

## 2021-01-08 ENCOUNTER — OFFICE VISIT (OUTPATIENT)
Dept: CARDIOLOGY CLINIC | Age: 71
End: 2021-01-08
Payer: COMMERCIAL

## 2021-01-08 ENCOUNTER — NURSE ONLY (OUTPATIENT)
Dept: CARDIOLOGY CLINIC | Age: 71
End: 2021-01-08
Payer: COMMERCIAL

## 2021-01-08 VITALS
WEIGHT: 226.5 LBS | HEART RATE: 67 BPM | OXYGEN SATURATION: 97 % | SYSTOLIC BLOOD PRESSURE: 130 MMHG | DIASTOLIC BLOOD PRESSURE: 70 MMHG | BODY MASS INDEX: 36.4 KG/M2 | HEIGHT: 66 IN

## 2021-01-08 DIAGNOSIS — Z95.0 PACEMAKER: ICD-10-CM

## 2021-01-08 DIAGNOSIS — I44.7 LBBB (LEFT BUNDLE BRANCH BLOCK): ICD-10-CM

## 2021-01-08 DIAGNOSIS — I10 ESSENTIAL HYPERTENSION: ICD-10-CM

## 2021-01-08 DIAGNOSIS — I44.2 COMPLETE HEART BLOCK (HCC): ICD-10-CM

## 2021-01-08 DIAGNOSIS — I44.2 COMPLETE HEART BLOCK (HCC): Primary | ICD-10-CM

## 2021-01-08 PROCEDURE — 93280 PM DEVICE PROGR EVAL DUAL: CPT | Performed by: INTERNAL MEDICINE

## 2021-01-08 PROCEDURE — 99214 OFFICE O/P EST MOD 30 MIN: CPT | Performed by: NURSE PRACTITIONER

## 2021-01-08 PROCEDURE — 93000 ELECTROCARDIOGRAM COMPLETE: CPT | Performed by: NURSE PRACTITIONER

## 2021-01-08 NOTE — PROGRESS NOTES
[address]   Electrophysiology Note              Date:  January 8, 2021  Patient name: Shima Washington  YOB: 1950    Primary Care physician: Cathy Cho MD    HISTORY OF PRESENT ILLNESS: The patient is a 69 y.o.  male with a history of complete heart block, alternating BBB, HTN, HLD, and AI. He was admitted in 4/2019 with dizziness. Developed 2:1 AVB and CHB with alternating BBB. On 4/19/2019 he had a dual chamber pacemaker implanted at the HIS bundle. Device checks had been normal. In 8/2020 he went to the ER with dizziness. Device check showed increased HIS lead threshold with intermittent capture and output was increased to 5V at 1ms. Further device changes were made 8/2020 to improve battery life. Today he is being seen for complete heart block. EKG shows AS- (HIS) with a HR of 67. He has some chronic mild SOB with exertion. Denies chest pain, palpitations, and dizziness. Device check today shows:   Brand: wishkicker  Mode: DDD  Normal function   1.6% AP  99.9%    Arrhythmias: none  Battery life 6.7 years  RA impedance 456 ohms   RV impedance 285 ohms   RA threshold 1.5 V @ 0.4 ms  RV threshold 2.5 V @ 1.0 ms  RA sensitivity 0.3 mV  RV sensitivity 0.45 mV     Past Medical History:   has a past medical history of Anxiety, Hyperlipidemia, Hypertension, Hypertrophy of prostate without urinary obstruction, Mild mitral regurgitation, and Moderate aortic insufficiency. Past Surgical History:   has a past surgical history that includes Colonoscopy (4/21/2008); vitrectomy (Left, 2018); Cataract removal with implant (Left, 05/01/2019); Intracapsular cataract extraction (Left, 5/1/2019); and Cardiac pacemaker placement (04/19/2019). Home Medications:    Prior to Admission medications    Medication Sig Start Date End Date Taking?  Authorizing Provider   escitalopram (LEXAPRO) 10 MG tablet Take 1 tablet by mouth daily 11/2/20  Yes Pancho Lucas MD pravastatin (PRAVACHOL) 80 MG tablet TAKE ONE TABLET DAILY AT BEDTIME 7/29/20  Yes Leonard Ibrahim MD   telmisartan-hydrochlorothiazide (MICARDIS HCT) 80-25 MG per tablet TAKE ONE TABLET DAILY 7/29/20  Yes Leonard Ibrahim MD   venlafaxine (EFFEXOR XR) 75 MG extended release capsule Take one tablet daily  Patient not taking: Reported on 1/8/2021 7/29/20   Leonard Ibrahim MD       Allergies:  Patient has no known allergies. Social History:   reports that he has never smoked. He has never used smokeless tobacco. He reports current alcohol use. He reports that he does not use drugs. Family History: family history includes Cancer in his father; Cancer (age of onset: 62) in his brother; No Known Problems in his sister; Other in his mother. Review of Systems   Constitutional: Negative. HENT: Negative. Eyes: Negative. Respiratory: + mild SOB with exertion  Cardiovascular: see HPI  Gastrointestinal: Negative. Genitourinary: Negative. Musculoskeletal: Negative. Skin: Negative. Neurological: Negative. Hematological: Negative. Psychiatric/Behavioral: Negative. PHYSICAL EXAM:    Vital signs:    /70   Pulse 67   Ht 5' 6\" (1.676 m)   Wt 226 lb 8 oz (102.7 kg)   SpO2 97%   BMI 36.56 kg/m²      Constitutional and general appearance: alert, cooperative, no distress and appears stated age  HEENT: PERRL, no cervical lymphadenopathy. No masses palpable.  Normal oral mucosa  Respiratory:  · Normal excursion and expansion without use of accessory muscles  · Resp auscultation: Normal breath sounds without wheezing, rhonchi, and rales  Cardiovascular:  · The apical impulse is not displaced  · Heart tones are crisp and normal. regular S1 and S2.  · Jugular venous pulsation Normal  · The carotid upstroke is normal in amplitude and contour without delay or bruit  · Peripheral pulses are symmetrical and full   Abdomen:  · No masses or tenderness  · Bowel sounds present -HIS lead with intermittent capture 8/2020 (patient was symptomatic) and required increased output   -device check 1/7/2021 showed normal function as noted in HPI  2. Alternating BBB  3. HTN: controlled   4. HLD  5. Aortic insufficiency: moderate    Plan:   1. Continue telmisartan and HCTZ  2. Annual BMP (due 8/2021)   3. Remote device transmissions every 3 months    4.  Follow up with Dr. Zhanna Lr in 6 months     MDM: moderate    James Garcia, [address]  [phone]

## 2021-01-08 NOTE — LETTER
[address]   Electrophysiology Note              Date:  January 8, 2021  Patient name: Donald Hilliard  YOB: 1950    Primary Care physician: Ragini Mccauley MD    HISTORY OF PRESENT ILLNESS: The patient is a 69 y.o.  male with a history of complete heart block, alternating BBB, HTN, HLD, and AI. He was admitted in 4/2019 with dizziness. Developed 2:1 AVB and CHB with alternating BBB. On 4/19/2019 he had a dual chamber pacemaker implanted at the HIS bundle. Device checks had been normal. In 8/2020 he went to the ER with dizziness. Device check showed increased HIS lead threshold with intermittent capture and output was increased to 5V at 1ms. Further device changes were made 8/2020 to improve battery life. Today he is being seen for complete heart block. EKG shows AS- (HIS) with a HR of 67. He has some chronic mild SOB with exertion. Denies chest pain, palpitations, and dizziness. Device check today shows:   Brand: [employer]iwave  Mode: DDD  Normal function   1.6% AP  99.9%    Arrhythmias: none  Battery life 6.7 years  RA impedance 456 ohms   RV impedance 285 ohms   RA threshold 1.5 V @ 0.4 ms  RV threshold 2.5 V @ 1.0 ms  RA sensitivity 0.3 mV  RV sensitivity 0.45 mV     Past Medical History:   has a past medical history of Anxiety, Hyperlipidemia, Hypertension, Hypertrophy of prostate without urinary obstruction, Mild mitral regurgitation, and Moderate aortic insufficiency. Past Surgical History:   has a past surgical history that includes Colonoscopy (4/21/2008); vitrectomy (Left, 2018); Cataract removal with implant (Left, 05/01/2019); Intracapsular cataract extraction (Left, 5/1/2019); and Cardiac pacemaker placement (04/19/2019). Home Medications:    Prior to Admission medications    Medication Sig Start Date End Date Taking?  Authorizing Provider escitalopram (LEXAPRO) 10 MG tablet Take 1 tablet by mouth daily 11/2/20  Yes Vahid Pagan MD   pravastatin (PRAVACHOL) 80 MG tablet TAKE ONE TABLET DAILY AT BEDTIME 7/29/20  Yes Vahid Pagan MD   telmisartan-hydrochlorothiazide (MICARDIS HCT) 80-25 MG per tablet TAKE ONE TABLET DAILY 7/29/20  Yes Vahid Pagan MD   venlafaxine (EFFEXOR XR) 75 MG extended release capsule Take one tablet daily  Patient not taking: Reported on 1/8/2021 7/29/20   Vahid Pagan MD       Allergies:  Patient has no known allergies. Social History:   reports that he has never smoked. He has never used smokeless tobacco. He reports current alcohol use. He reports that he does not use drugs. Family History: family history includes Cancer in his father; Cancer (age of onset: 62) in his brother; No Known Problems in his sister; Other in his mother. Review of Systems   Constitutional: Negative. HENT: Negative. Eyes: Negative. Respiratory: + mild SOB with exertion  Cardiovascular: see HPI  Gastrointestinal: Negative. Genitourinary: Negative. Musculoskeletal: Negative. Skin: Negative. Neurological: Negative. Hematological: Negative. Psychiatric/Behavioral: Negative. PHYSICAL EXAM:    Vital signs:    /70   Pulse 67   Ht 5' 6\" (1.676 m)   Wt 226 lb 8 oz (102.7 kg)   SpO2 97%   BMI 36.56 kg/m²      Constitutional and general appearance: alert, cooperative, no distress and appears stated age  HEENT: PERRL, no cervical lymphadenopathy. No masses palpable.  Normal oral mucosa  Respiratory:  · Normal excursion and expansion without use of accessory muscles  · Resp auscultation: Normal breath sounds without wheezing, rhonchi, and rales  Cardiovascular:  · The apical impulse is not displaced  · Heart tones are crisp and normal. regular S1 and S2.  · Jugular venous pulsation Normal  · The carotid upstroke is normal in amplitude and contour without delay or bruit · Peripheral pulses are symmetrical and full   Abdomen:  · No masses or tenderness  · Bowel sounds present  Extremities:  ·  No cyanosis or clubbing  ·  No lower extremity edema  ·  Skin: warm and dry; left upper chest incision is closed and healed   Neurological:  · Alert and oriented  · Moves all extremities well  · No abnormalities of mood, affect, memory, mentation, or behavior are noted    DATA:    ECG 1/8/2021:  SR with  (HIS) HR 67    Echo 4/19/2019:  Normal left ventricular systolic function with ejection fraction of 55-60%.   The study is inadequate to exclude regional wall motion abnormalities.   Systolic pulmonic artery pressure (SPAP) is normal estimated at 24 mmHg   (Right atrial pressure of 3 mmHg).   Compared to previous study from 12- no changes noted. Echo 12/20/2017:  Normal left ventricle systolic function with an estimated ejection fraction of 55%.   No regional wall motion abnormalities are seen.   Diastolic filling parameters suggest normal diastolic filing pressure.   Mild mitral regurgitation.   Individual aortic valve leaflets are not clearly visualized, aortic valve appears sclerotic.   Moderate aortic regurgitation.   Systolic pulmonary artery pressure (SPAP) is normal and estimated at 15 mmHg   (RA pressure of 3 mmHg). All labs and testing reviewed. CARDIOLOGY LABS:   CBC: No results for input(s): WBC, HGB, HCT, PLT in the last 72 hours. BMP: No results for input(s): NA, K, CO2, BUN, CREATININE, LABGLOM, GLUCOSE in the last 72 hours. PT/INR: No results for input(s): PROTIME, INR in the last 72 hours. APTT:No results for input(s): APTT in the last 72 hours. FASTING LIPID PANEL:  Lab Results   Component Value Date    HDL 50 11/02/2020    LDLCALC 50 11/02/2020    TRIG 207 11/02/2020     LIVER PROFILE:No results for input(s): AST, ALT, ALB in the last 72 hours. Assessment:   1.  Complete heart block: ongoing -s/p dual chamber pacemaker implant  In 5/2019 (implanted at the HIS bundle)   -HIS lead with intermittent capture 8/2020 (patient was symptomatic) and required increased output   -device check 1/7/2021 showed normal function as noted in HPI  2. Alternating BBB  3. HTN: controlled   4. HLD  5. Aortic insufficiency: moderate    Plan:   1. Continue telmisartan and HCTZ  2. Annual BMP (due 8/2021)   3. Remote device transmissions every 3 months    4.  Follow up with Dr. Pineda Prudent in 6 months     MDM: kenneth Tanner, [address]  [phone]

## 2021-01-11 NOTE — PROGRESS NOTES
Interrogation was preformed by company representative. See interrogation for further details. Patient will see NP Candance Antonio today in office. See Paceart report under the Cardiology tab and/or the Media tab for scanned in report.

## 2021-02-02 ENCOUNTER — OFFICE VISIT (OUTPATIENT)
Dept: INTERNAL MEDICINE CLINIC | Age: 71
End: 2021-02-02

## 2021-02-02 VITALS
HEART RATE: 70 BPM | BODY MASS INDEX: 36.48 KG/M2 | DIASTOLIC BLOOD PRESSURE: 80 MMHG | HEIGHT: 66 IN | RESPIRATION RATE: 12 BRPM | TEMPERATURE: 96.9 F | WEIGHT: 227 LBS | SYSTOLIC BLOOD PRESSURE: 120 MMHG

## 2021-02-02 DIAGNOSIS — Z95.0 PACEMAKER: ICD-10-CM

## 2021-02-02 DIAGNOSIS — I44.2 COMPLETE HEART BLOCK (HCC): ICD-10-CM

## 2021-02-02 DIAGNOSIS — I35.1 MODERATE AORTIC INSUFFICIENCY: ICD-10-CM

## 2021-02-02 DIAGNOSIS — D49.2 SKIN NEOPLASM: ICD-10-CM

## 2021-02-02 DIAGNOSIS — I10 ESSENTIAL HYPERTENSION: ICD-10-CM

## 2021-02-02 DIAGNOSIS — I34.0 MILD MITRAL REGURGITATION: ICD-10-CM

## 2021-02-02 DIAGNOSIS — E78.5 HYPERLIPIDEMIA, UNSPECIFIED HYPERLIPIDEMIA TYPE: ICD-10-CM

## 2021-02-02 DIAGNOSIS — I10 ESSENTIAL HYPERTENSION: Primary | ICD-10-CM

## 2021-02-02 LAB
A/G RATIO: 1.5 (ref 1.1–2.2)
ALBUMIN SERPL-MCNC: 4.5 G/DL (ref 3.4–5)
ALP BLD-CCNC: 65 U/L (ref 40–129)
ALT SERPL-CCNC: 20 U/L (ref 10–40)
ANION GAP SERPL CALCULATED.3IONS-SCNC: 14 MMOL/L (ref 3–16)
AST SERPL-CCNC: 24 U/L (ref 15–37)
BASOPHILS ABSOLUTE: 0 K/UL (ref 0–0.2)
BASOPHILS RELATIVE PERCENT: 0.6 %
BILIRUB SERPL-MCNC: 0.5 MG/DL (ref 0–1)
BUN BLDV-MCNC: 16 MG/DL (ref 7–20)
CALCIUM SERPL-MCNC: 10.2 MG/DL (ref 8.3–10.6)
CHLORIDE BLD-SCNC: 101 MMOL/L (ref 99–110)
CO2: 28 MMOL/L (ref 21–32)
CREAT SERPL-MCNC: 0.9 MG/DL (ref 0.8–1.3)
EOSINOPHILS ABSOLUTE: 0.1 K/UL (ref 0–0.6)
EOSINOPHILS RELATIVE PERCENT: 1.5 %
GFR AFRICAN AMERICAN: >60
GFR NON-AFRICAN AMERICAN: >60
GLOBULIN: 3 G/DL
GLUCOSE BLD-MCNC: 89 MG/DL (ref 70–99)
HCT VFR BLD CALC: 46.9 % (ref 40.5–52.5)
HEMOGLOBIN: 15.6 G/DL (ref 13.5–17.5)
LYMPHOCYTES ABSOLUTE: 1.7 K/UL (ref 1–5.1)
LYMPHOCYTES RELATIVE PERCENT: 21.5 %
MCH RBC QN AUTO: 29.9 PG (ref 26–34)
MCHC RBC AUTO-ENTMCNC: 33.2 G/DL (ref 31–36)
MCV RBC AUTO: 90.2 FL (ref 80–100)
MONOCYTES ABSOLUTE: 0.8 K/UL (ref 0–1.3)
MONOCYTES RELATIVE PERCENT: 9.5 %
NEUTROPHILS ABSOLUTE: 5.4 K/UL (ref 1.7–7.7)
NEUTROPHILS RELATIVE PERCENT: 66.9 %
PDW BLD-RTO: 13.6 % (ref 12.4–15.4)
PLATELET # BLD: 214 K/UL (ref 135–450)
PMV BLD AUTO: 9 FL (ref 5–10.5)
POTASSIUM SERPL-SCNC: 3.9 MMOL/L (ref 3.5–5.1)
RBC # BLD: 5.2 M/UL (ref 4.2–5.9)
SODIUM BLD-SCNC: 143 MMOL/L (ref 136–145)
TOTAL PROTEIN: 7.5 G/DL (ref 6.4–8.2)
URIC ACID, SERUM: 8 MG/DL (ref 3.5–7.2)
WBC # BLD: 8.1 K/UL (ref 4–11)

## 2021-02-02 PROCEDURE — 99214 OFFICE O/P EST MOD 30 MIN: CPT | Performed by: INTERNAL MEDICINE

## 2021-02-02 RX ORDER — PRAVASTATIN SODIUM 80 MG/1
TABLET ORAL
Qty: 90 TABLET | Refills: 3 | Status: SHIPPED | OUTPATIENT
Start: 2021-02-02 | End: 2021-05-05 | Stop reason: SDUPTHER

## 2021-02-02 RX ORDER — VENLAFAXINE HYDROCHLORIDE 75 MG/1
CAPSULE, EXTENDED RELEASE ORAL
Qty: 90 CAPSULE | Refills: 3 | Status: SHIPPED | OUTPATIENT
Start: 2021-02-02 | End: 2021-05-05

## 2021-02-02 RX ORDER — TELMISARTAN AND HYDROCHLORTHIAZIDE 80; 25 MG/1; MG/1
TABLET ORAL
Qty: 90 TABLET | Refills: 3 | Status: SHIPPED | OUTPATIENT
Start: 2021-02-02 | End: 2021-05-05 | Stop reason: SDUPTHER

## 2021-02-02 ASSESSMENT — ENCOUNTER SYMPTOMS
NAUSEA: 0
RHINORRHEA: 0
VOMITING: 0
ABDOMINAL PAIN: 0
COUGH: 0
BACK PAIN: 0
WHEEZING: 0
SHORTNESS OF BREATH: 0

## 2021-02-02 NOTE — PROGRESS NOTES
Subjective:      Patient ID: Jarred Ortega is a 69 y.o. . HPI  Patient is here for follow up of hypertension and hyperlipidemia. Patient's BP is controlled on current medications. No chest pain or dyspnea. His cholesterol is at goal. No myalgia. His anxiety is controlled. He had a complete heart block and has a pacemaker. He has moderate AI and mild MR. No changes. His vision is stable. He has had a prior vitrectomy. He has a lesion left ear for two weeks. It was a raised lesion. He picked on it and it started bleeding. Review of Systems   Constitutional: Negative for activity change and appetite change. HENT: Negative for postnasal drip and rhinorrhea. Respiratory: Negative for cough, shortness of breath and wheezing. Cardiovascular: Negative for chest pain, palpitations and leg swelling. Gastrointestinal: Negative for abdominal pain, nausea and vomiting. Genitourinary: Negative for difficulty urinating and frequency. Musculoskeletal: Negative for back pain and joint swelling. Skin: Negative for rash. Skin lesion on the left ear   Neurological: Negative for light-headedness. Psychiatric/Behavioral: Negative for sleep disturbance. Current Outpatient Medications   Medication Sig Dispense Refill    telmisartan-hydrochlorothiazide (MICARDIS HCT) 80-25 MG per tablet TAKE ONE TABLET DAILY 90 tablet 3    pravastatin (PRAVACHOL) 80 MG tablet TAKE ONE TABLET DAILY AT BEDTIME 90 tablet 3    venlafaxine (EFFEXOR XR) 75 MG extended release capsule Take one tablet daily 90 capsule 3    escitalopram (LEXAPRO) 10 MG tablet Take 1 tablet by mouth daily 90 tablet 3     No current facility-administered medications for this visit. There are no changes to past medical history, family history, social history or review of systems(except as noted in the history section) since prior note (all reviewed with patient).     /80 (Site: Right Upper Arm, Position: Sitting, Cuff Size: Large Adult)   Pulse 70   Temp 96.9 °F (36.1 °C)   Resp 12   Ht 5' 6\" (1.676 m)   Wt 227 lb (103 kg)   BMI 36.64 kg/m²      Objective:   Physical Exam   Constitutional: He appears well-developed and well-nourished. HENT:   Head: Normocephalic and atraumatic. Eyes: Pupils are equal, round, and reactive to light. Conjunctivae and EOM are normal. No scleral icterus. Neck: Normal range of motion. Neck supple. No thyromegaly present. Cardiovascular: Normal rate and regular rhythm. No murmur heard. Pulmonary/Chest: Effort normal. He has no decreased breath sounds. He has no wheezes. Abdominal: Soft. He exhibits no mass. There is no abdominal tenderness. There is no rebound. Musculoskeletal:         General: No edema. Lymphadenopathy:     He has no cervical adenopathy. Skin:   Raised lesion left ear           ECHO DONE ON 12/20/2017  Conclusions      Summary   Normal left ventricle systolic function with an estimated ejection fraction   of 55%.   No regional wall motion abnormalities are seen.   Diastolic filling parameters suggest normal diastolic filing pressure.   Mild mitral regurgitation.   Individual aortic valve leaflets are not clearly visualized, aortic valve   appears sclerotic.   Moderate aortic regurgitation.   Systolic pulmonary artery pressure (SPAP) is normal and estimated at 15 mmHg   (RA pressure of 3 mmHg). Assessment:          Diagnosis Orders   1. Essential hypertension  Comprehensive Metabolic Panel    CBC Auto Differential    Uric Acid   2. Hyperlipidemia, unspecified hyperlipidemia type     3. Moderate aortic insufficiency     4. Mild mitral regurgitation     5. Pacemaker     6. Complete heart block ([address]     7. Skin neoplasm  Tashi Corbett MD, Otolaryngology, [facility]           Plan:      Decrease calorie intake. Hypertension:  Well controlled. Hyperlipidemia:  At goal.   Anxiety: well controlled on Celexa. Mild MR and Moderate AI stable.  AI not seen on last ECHO. Skin neoplasm left ear. Refer to Dr Marylen Hoe        Decrease calorie intake. Exercise,weight loss recommended. The current medical regimen is effective;  continue present plan and medications. See orders.

## 2021-02-03 ENCOUNTER — OFFICE VISIT (OUTPATIENT)
Dept: ENT CLINIC | Age: 71
End: 2021-02-03
Payer: COMMERCIAL

## 2021-02-03 VITALS
WEIGHT: 227 LBS | TEMPERATURE: 97.2 F | HEART RATE: 77 BPM | BODY MASS INDEX: 36.48 KG/M2 | HEIGHT: 66 IN | SYSTOLIC BLOOD PRESSURE: 143 MMHG | DIASTOLIC BLOOD PRESSURE: 71 MMHG

## 2021-02-03 DIAGNOSIS — H61.92 SKIN LESION OF LEFT EXTERNAL EAR: Primary | ICD-10-CM

## 2021-02-03 PROBLEM — D49.2 SKIN NEOPLASM: Status: RESOLVED | Noted: 2021-02-02 | Resolved: 2021-02-03

## 2021-02-03 PROCEDURE — 11104 PUNCH BX SKIN SINGLE LESION: CPT | Performed by: OTOLARYNGOLOGY

## 2021-02-03 PROCEDURE — 99203 OFFICE O/P NEW LOW 30 MIN: CPT | Performed by: OTOLARYNGOLOGY

## 2021-02-03 NOTE — PROGRESS NOTES
[facility] PATIENT HISTORY AND PHYSICAL NOTE      Patient Name: Zelda Goodrich  Medical Record Number:  [id number]  Primary Care Physician:  Tania Simpson MD    ChiefComplaint     Chief Complaint   Patient presents with   Draper Lesion(s)     Patient here for a left ear lesion. First noticed about 3 weeks ago. He denies any pain or drainage       History of Present Illness     Zelda Goodrich is an 69 y.o. male referred for left ear lesion. This is been present for the past 3 weeks, he first noticed it as a small bump which has since grown in size. He think that it, with scant amount of bleeding, however has been stable for the past 1.5 weeks. Denies any purulent drainage, tenderness on palpation, neck lymphadenopathy. No fevers, chills, night sweats, dysphagia/odynophagia, other skin lesions. 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 No family history of head and neck cancer/cutaneous malignancy. No history of immunocompromise or uncontrolled diabetes. He is not currently on any blood thinners, although he has a pacemaker for left bundle branch block.     Past Medical History     Past Medical History:   Diagnosis Date    Anxiety 12/21/2012    Hyperlipidemia     Hypertension     Hypertrophy of prostate without urinary obstruction     Mild mitral regurgitation 3/6/2018    Moderate aortic insufficiency 3/6/2018       Past Surgical History     Past Surgical History:   Procedure Laterality Date    CARDIAC PACEMAKER PLACEMENT  04/19/2019    CATARACT REMOVAL WITH IMPLANT Left 05/01/2019    COLONOSCOPY  4/21/2008    INTRACAPSULAR CATARACT EXTRACTION Left 5/1/2019    PHACO EMULSIFICATION OF CATARACT WITH  INTRAOCULAR LENS IMPLANT performed by Pepe Maier MD at [facility] 15 Left 2018       Family History     Family History   Problem Relation Age of Onset    Cancer Brother 62 BP (!) 143/71 (Site: Right Upper Arm, Position: Sitting)   Pulse 77   Temp 97.2 °F (36.2 °C)   Ht 5' 6\" (1.676 m)   Wt 227 lb (103 kg)   BMI 36.64 kg/m²     GENERAL: No Acute Distress, Alert and Oriented, no hoarseness  EYES: EOMI, Anti-icteric  NOSE: On anterior rhinoscopy there is no epistaxis, nasal mucosa within normal limits, no purulent drainage  EARS: Normal external appearance of the right ear. On the left antihelix just inferior to the allie cymba there is a circular lesion, raised, 60 mm in diameter with a ulcerative center; on portable otomicroscopy:  -Right ear: External auditory canal without stenosis, tympanic membrane clear, no middle ear effusions or retractions  -Left ear: External auditory canal without stenosis, tympanic membrane clear, no middle ear effusions or retractions  FACE: 1/6 House-Brackmann Scale, symmetric, sensation equal bilaterally  ORAL CAVITY: No masses or lesions palpated, uvula is midline, moist mucous membranes, 1+ tonsils, dentition with multiple areas of fractures and caries  NECK: Normal range of motion, no thyromegaly, trachea is midline, no lymphadenopathy, no neck masses, no crepitus  CHEST: Normal respiratory effort, no retractions, breathing comfortably  SKIN: Scattered areas of actinic keratosis, none suspicious for cutaneous neoplasm  NEURO: CN 2, 3, 4, 5, 6, 7, 11, 12 intact bilaterally         Data/Imaging Review     None    Procedure     Head/Neck Biopsy     Pre op: Skin lesion of the left antihelix  Post op: Same  Procedure: Biopsy of left antihelix  Surgeon: GINO Bianchi  Anesthesia: 1% lidocaine with epinephrine 1:100,000; 0.8 cc used  Estimated Blood Loss: Minimal  The patient was placed in the examination chair in upright position. Risks and benefits of the procedure including pain, infection, inflammation, hemorrhage/hematoma were outlined. Local anesthesia was infiltrated over the  good with blanching appreciated. We waited 10 minutes to allow for adequate vasoconstriction. A 4 mm punch biopsy was used to take a sample from the left antihelix. This was placed into formalin for pathological evaluation; a small ellipse was also taken from the inferior aspect of the biopsy and sent for Gram stain and culture. The site was closed with 5-0 fast absorbing gut in interrupted fashion. *Patient tolerated the procedure well with no complications  *Patient instructed to place antibiotic ointment over the site three times daily for the next 4-5 days    Assessment and Plan     1. Skin lesion of left external ear  Patient with ulcerative lesion of the left antihelix, inferior to the allie cymba that has been present for approximately 3 weeks. This is approximately 16 mm in diameter, was biopsied under local anesthesia today. We sent specimens for both Gram stain and culture (including fungal cultures) and will call him with results; he is to follow up in 1 week for wound check. - Surgical Pathology  - Culture, Aerobic and Anaerobic    No follow-ups on file. Cindy Cantu MD  [address] [place], [place] [address]  Department of Otolaryngology/Head and Neck Surgery  2/3/21    I have performed a head and neck physical exam personally or was physically present during the key or critical portions of the service. Medical Decision Making: The following items were considered in medical decision making:  Independent review of images  Review / order clinical lab tests  Review / order radiology tests  Decision to obtain old records  Review and summation of old records as accessed through [employer] (a summary of my findings in these old records: none)     Portions of this note were dictated using Dragon.  There may be linguistic errors secondary to the use of this program.

## 2021-02-04 ENCOUNTER — IMMUNIZATION (OUTPATIENT)
Dept: PRIMARY CARE CLINIC | Age: 71
End: 2021-02-04
Payer: COMMERCIAL

## 2021-02-04 PROCEDURE — 91301 COVID-19, MODERNA VACCINE 100MCG/0.5ML DOSE: CPT | Performed by: FAMILY MEDICINE

## 2021-02-04 PROCEDURE — 0011A COVID-19, MODERNA VACCINE 100MCG/0.5ML DOSE: CPT | Performed by: FAMILY MEDICINE

## 2021-02-08 ENCOUNTER — TELEPHONE (OUTPATIENT)
Dept: INTERNAL MEDICINE CLINIC | Age: 71
End: 2021-02-08

## 2021-02-08 DIAGNOSIS — R50.9 FEBRILE ILLNESS: Primary | ICD-10-CM

## 2021-02-08 LAB
ANAEROBIC CULTURE: NORMAL
GRAM STAIN RESULT: NORMAL
WOUND/ABSCESS: NORMAL

## 2021-02-08 NOTE — TELEPHONE ENCOUNTER
----- Message from Johnny Gomez sent at 2/8/2021  9:19 AM EST -----  Contact: Blake Florez [phone]  Patient needs orders for Covid Test. Thank you

## 2021-02-10 ENCOUNTER — OFFICE VISIT (OUTPATIENT)
Dept: ENT CLINIC | Age: 71
End: 2021-02-10

## 2021-02-10 VITALS — TEMPERATURE: 97.2 F | HEIGHT: 66 IN | BODY MASS INDEX: 35.36 KG/M2 | WEIGHT: 220 LBS

## 2021-02-10 DIAGNOSIS — C44.229 SQUAMOUS CELL CARCINOMA, EAR, LEFT: Primary | ICD-10-CM

## 2021-02-10 PROCEDURE — 99024 POSTOP FOLLOW-UP VISIT: CPT | Performed by: OTOLARYNGOLOGY

## 2021-02-10 NOTE — PROGRESS NOTES
INTRAOCULAR LENS IMPLANT performed by Jodi Taylor MD at [facility] VITRECTOMY Left 2018       Family History     Family History   Problem Relation Age of Onset    Cancer Brother 62        prostate cancer    Other Mother         Lung fibrosis    Cancer Father         Prostate cancer    No Known Problems Sister        Social History     Social History     Tobacco Use    Smoking status: Never Smoker    Smokeless tobacco: Never Used   Substance Use Topics    Alcohol use: Yes     Comment: rarely    Drug use: No        Allergies     No Known Allergies    Medications     Current Outpatient Medications   Medication Sig Dispense Refill    telmisartan-hydrochlorothiazide (MICARDIS HCT) 80-25 MG per tablet TAKE ONE TABLET DAILY 90 tablet 3    pravastatin (PRAVACHOL) 80 MG tablet TAKE ONE TABLET DAILY AT BEDTIME 90 tablet 3    venlafaxine (EFFEXOR XR) 75 MG extended release capsule Take one tablet daily 90 capsule 3    escitalopram (LEXAPRO) 10 MG tablet Take 1 tablet by mouth daily 90 tablet 3     No current facility-administered medications for this visit.         Review of Systems     REVIEW OF SYSTEMS  The following systems were reviewed and revealed the following in addition to any already discussed in the HPI:    CONSTITUTIONAL: No weight loss, no fever, no night sweats, no chills  EYES: no vision changes, no blurry vision  EARS: no changes in hearing, no otalgia  NOSE: no epistaxis, no rhinorrhea  RESPIRATORY: No difficulty breathing, no shortness of breath  CV: no chest pain, no peripheral vascular disease  HEME: No coagulation disorder, no bleeding disorder  NEURO: No TIA or stroke-like symptoms  SKIN: No new rashes in the head and neck, no recent skin cancers  MOUTH: No new ulcers, no recent teeth infections  GASTROINTESTINAL: No diarrhea, stomach pain  PSYCH: No anxiety, no depression    PhysicalExam     Vitals:    02/10/21 1055   Temp: 97.2 °F (36.2 °C)   TempSrc: Infrared   Weight: 220 lb (99.8 kg) Height: 5' 6\" (1.676 m)       PHYSICAL EXAM  Temp 97.2 °F (36.2 °C) (Infrared)   Ht 5' 6\" (1.676 m)   Wt 220 lb (99.8 kg)   BMI 35.51 kg/m²     GENERAL: No Acute Distress, Alert and Oriented, no hoarseness  EYES: EOMI, Anti-icteric  NOSE: On anterior rhinoscopy there is no epistaxis, nasal mucosa within normal limits, no purulent drainage  EARS: Normal external appearance of the right ear. On the left antihelix just inferior to the allie cymba there is a circular lesion, raised, 10mm in diameter with telangiectatic change of the skin  FACE: 1/6 House-Brackmann Scale, symmetric, sensation equal bilaterally  ORAL CAVITY: No masses or lesions palpated, uvula is midline, moist mucous membranes, 1+ tonsils, dentition with multiple areas of fractures and caries  NECK: Normal range of motion, no thyromegaly, trachea is midline, no lymphadenopathy, no neck masses, no crepitus  CHEST: Normal respiratory effort, no retractions, breathing comfortably  SKIN: Scattered areas of actinic keratosis, none overt lesions suspicious for cutaneous neoplasm  NEURO: CN 2, 3, 4, 5, 6, 7, 11, 12 intact bilaterally            Procedure     none    Assessment and Plan     1. Squamous cell carcinoma, ear, left  Patient with left ear squamous cell carcinoma, well differentiated, [id number].   We discussed options including observation, topical therapy or excision with frozen sections and reconstruction and the patient is in agreement with plan for surgical excision with reconstruction  Given the above history and the patient's desire for surgery, they are a candidate for wide local excision of the left ear with plan for local flap reconstruction/adjacent tissue transfer, full thickness skin graft or cartilage grafting  -Risks and benefits of the above procedure include incomplete excision of the lesion/recurrence, cosmesis, pain, inflammation, infection, hemorrhage, hematoma/seroma outlined and patient agreed to proceed  -General anesthesia

## 2021-02-10 NOTE — PROGRESS NOTES
Jarred Ortega    Age 69 y.o.    male    1950    MRN [id number]    3/12/2021  Arrival Time_____________  OR Time____________205 Min     Procedure(s):  WIDE LOCAL EXCISION OF LEFT EAR LESION WITH FROZEN SECTIONS, RECONSTRUCTION  WITH POSSIBLE FULL THICKNESS SKIN GRAFT, LOCAL FLAP, CARTILAGE GRAFT                      General     Surgeon(s):  Elle Geronimo MD      DAY ADMIT ___  SDS/OP ___  OUTPT IN BED ___         Phone [phone] (home) [phone] (work)   PCP _____________________ Phone_________________ [address] ( ) Epic  ( ) Appt ________    ADDITIONAL INFO __________________________________ Cardio/Consult _____________    NOTES _____________________________________________________________________    ____________________________________________________________________________    PAT APPT DATE:________ TIME: ________  FAXED QAD: _______  (__) H&P w/ hospitalist  ____________________________________________________________________________    COVID TEST: Date/Location______________        NURSING HISTORY COMPLETE: _______  (__) CBC       (__) W/ DIFF ___________  (__)  ECHO    __________  (__) Hgb A1C    ___________  (__) CHEST X RAY   __________  (__) LIPID PROFILE  ___________  (__) EKG   __________  (__) PT/PTT   ___________  (__) PFT's   __________  (__) BMP   ___________  (__) CAROTIDS  __________  (__) CMP   ___________  (__) VEIN MAPPING  __________  (__) U/A   ___________  (__) HISTORY & PHYSICAL __________  (__) URINE C & S  ___________  (__) CARDIAC CLEARANCE __________  (__) U/A W/ FLEX  ___________  (__) PULM.  CLEARANCE __________  (__) SERUM PREGNANCY ___________  (__) Check Epic DOS orders __________  (__) TYPE & SCREEN ________ repeat ( ) (__)  __________________ __________  (__) ALBUMIN   ___________  (__)  __________________ __________  (__) TRANSFERRIN  ___________  (__)  __________________ __________  (__) LIVER PROFILE  ___________  (__)  __________________ __________ (__) CARBOXY HGB  ___________  (__) URINE PREG DOS __________  (__) NICOTINE & MET.  ___________  (__) BLOOD SUGAR DOS __________  (__) PREALBUMIN  ___________    (__) MRSA NASAL SWAB ___________  (__) BLOOD THINNERS __________  (__) ACE/ ARBS: _____________________    (__) BETABLOCKERS ___________________

## 2021-02-11 ENCOUNTER — TELEPHONE (OUTPATIENT)
Dept: INTERNAL MEDICINE CLINIC | Age: 71
End: 2021-02-11

## 2021-02-11 ENCOUNTER — HOSPITAL ENCOUNTER (OUTPATIENT)
Dept: CT IMAGING | Age: 71
Discharge: HOME | End: 2021-02-11
Payer: COMMERCIAL

## 2021-02-11 DIAGNOSIS — C44.229 SQUAMOUS CELL CARCINOMA, EAR, LEFT: ICD-10-CM

## 2021-02-11 PROCEDURE — 6360000004 HC RX CONTRAST MEDICATION: Performed by: OTOLARYNGOLOGY

## 2021-02-11 PROCEDURE — 70491 CT SOFT TISSUE NECK W/DYE: CPT

## 2021-02-11 RX ADMIN — IOPAMIDOL 75 ML: 755 INJECTION, SOLUTION INTRAVENOUS at 09:44

## 2021-02-11 NOTE — TELEPHONE ENCOUNTER
----- Message from Nataly Figueroa MD sent at 2/11/2021  1:02 PM EST -----  yes  ----- Message -----  From: Ric Wakefield  Sent: 2/11/2021   9:59 AM EST  To: Nataly Figueroa MD    Pt dropped off h&p form for surgery that he states you told him you would fill out. Pt's surgery is on March 12th and pt was last seen by you on February 2nd. Does pt need to schedule pre-op appt? Please advise.

## 2021-02-15 ENCOUNTER — TELEPHONE (OUTPATIENT)
Dept: OTOLARYNGOLOGY | Age: 71
End: 2021-02-15

## 2021-02-17 ENCOUNTER — OFFICE VISIT (OUTPATIENT)
Dept: INTERNAL MEDICINE CLINIC | Age: 71
End: 2021-02-17

## 2021-02-17 VITALS
BODY MASS INDEX: 36.48 KG/M2 | RESPIRATION RATE: 12 BRPM | HEIGHT: 66 IN | DIASTOLIC BLOOD PRESSURE: 80 MMHG | TEMPERATURE: 97.5 F | SYSTOLIC BLOOD PRESSURE: 136 MMHG | HEART RATE: 70 BPM | WEIGHT: 227 LBS

## 2021-02-17 DIAGNOSIS — I10 ESSENTIAL HYPERTENSION: ICD-10-CM

## 2021-02-17 DIAGNOSIS — Z01.818 PREOP EXAM FOR INTERNAL MEDICINE: Primary | ICD-10-CM

## 2021-02-17 DIAGNOSIS — I44.2 COMPLETE HEART BLOCK (HCC): ICD-10-CM

## 2021-02-17 DIAGNOSIS — Z95.0 PACEMAKER: ICD-10-CM

## 2021-02-17 DIAGNOSIS — C44.229 SQUAMOUS CELL CANCER OF EXTERNAL EAR, LEFT: ICD-10-CM

## 2021-02-17 DIAGNOSIS — E78.5 HYPERLIPIDEMIA, UNSPECIFIED HYPERLIPIDEMIA TYPE: ICD-10-CM

## 2021-02-17 DIAGNOSIS — I35.1 MODERATE AORTIC INSUFFICIENCY: ICD-10-CM

## 2021-02-17 PROCEDURE — 99214 OFFICE O/P EST MOD 30 MIN: CPT | Performed by: INTERNAL MEDICINE

## 2021-02-17 NOTE — PROGRESS NOTES
Subjective:      Donald Hilliard is a 69 y.o. male who presents to the office today for a preoperative consultation at the request of surgeon Dr Capri Arrington who plans on performing  wide local excision of the left ear with plan for local flap reconstruction/adjacent tissues transfer, full thickness skin graft or cartilage grafting   Planned anesthesia is General.       Past Medical History:   Diagnosis Date    Anxiety 12/21/2012    Hyperlipidemia     Hypertension     Hypertrophy of prostate without urinary obstruction     Mild mitral regurgitation 3/6/2018    Moderate aortic insufficiency 3/6/2018     Past Surgical History:   Procedure Laterality Date    CARDIAC PACEMAKER PLACEMENT  04/19/2019    CATARACT REMOVAL WITH IMPLANT Left 05/01/2019    COLONOSCOPY  4/21/2008    INTRACAPSULAR CATARACT EXTRACTION Left 5/1/2019    PHACO EMULSIFICATION OF CATARACT WITH  INTRAOCULAR LENS IMPLANT performed by May Oropeza MD at [facility] 15 Left 2018     Family History   Problem Relation Age of Onset    Cancer Brother 62        prostate cancer    Other Mother         Lung fibrosis    Cancer Father         Prostate cancer    No Known Problems Sister      Social History     Socioeconomic History    Marital status:      Spouse name: None    Number of children: None    Years of education: None    Highest education level: None   Occupational History    None   Social Needs    Financial resource strain: None    Food insecurity     Worry: None     Inability: None    Transportation needs     Medical: None     Non-medical: None   Tobacco Use    Smoking status: Never Smoker    Smokeless tobacco: Never Used   Substance and Sexual Activity    Alcohol use: Yes     Comment: rarely    Drug use: No    Sexual activity: Yes     Partners: Female   Lifestyle    Physical activity     Days per week: None     Minutes per session: None    Stress: None   Relationships    Social connections     Talks on phone: None     Gets together: None     Attends [employer] service: None     Active member of club or organization: None     Attends meetings of clubs or organizations: None     Relationship status: None    Intimate partner violence     Fear of current or ex partner: None     Emotionally abused: None     Physically abused: None     Forced sexual activity: None   Other Topics Concern    None   Social History Narrative    None     Current Outpatient Medications   Medication Sig Dispense Refill    telmisartan-hydrochlorothiazide (MICARDIS HCT) 80-25 MG per tablet TAKE ONE TABLET DAILY 90 tablet 3    pravastatin (PRAVACHOL) 80 MG tablet TAKE ONE TABLET DAILY AT BEDTIME 90 tablet 3    venlafaxine (EFFEXOR XR) 75 MG extended release capsule Take one tablet daily 90 capsule 3    escitalopram (LEXAPRO) 10 MG tablet Take 1 tablet by mouth daily 90 tablet 3     No current facility-administered medications for this visit. No Known Allergies  Review of Systems  A comprehensive review of systems was negative.      Planned anesthesia: General  Known anesthesia problems: none  Bleeding risk: no  Personal or FH of DVT/PE: no  Patient objection to receiving blood products: no     Objective:      /80 (Site: Right Upper Arm, Position: Sitting, Cuff Size: Large Adult)   Pulse 70   Temp 97.5 °F (36.4 °C)   Resp 12   Ht 5' 6\" (1.676 m)   Wt 227 lb (103 kg)   BMI 36.64 kg/m²     General Appearance:  Alert, cooperative, no distress, appears stated age   Head:  Normocephalic, without obvious abnormality, atraumatic   Eyes:  PERRL, conjunctiva/corneas clear, EOM's intact   Ears:  Small mass left external ear   Nose: Nares normal, septum midline, mucosa normal, no drainage or sinus tenderness   Throat: Lips, mucosa, and tongue normal; teeth and gums normal   Neck: Supple, symmetrical, trachea midline, no adenopathy, thyroid: not enlarged, symmetric, no tenderness/mass/nodules, no carotid bruit or JVD   Back:   deferred   Lungs: Clear to auscultation bilaterally, respirations unlabored   Chest Wall:  No tenderness or deformity   Heart:  Regular rate and rhythm, S1, S2 normal, + murmur, no rub or gallop   Abdomen:   Soft, non-tender, bowel sounds active all four quadrants,  no masses, no organomegaly   Genitalia:  deferred   Rectal:  deferred   Extremities: Extremities normal, atraumatic, no cyanosis or edema   Pulses: 2+ and symmetric   Skin: Skin color, texture, turgor normal, no rashes or lesions   Lymph nodes: Cervical, supraclavicular, and axillary nodes normal   Neurologic: Normal                Assessment:       Diagnosis Orders   1. Preop exam for internal medicine     2. Squamous cell cancer of external ear, left     3. Essential hypertension     4. Pacemaker     5. Moderate aortic insufficiency     6. Hyperlipidemia, unspecified hyperlipidemia type     7. Complete heart block ([address]           69 y.o. male with planned surgery as above. Plan:     Patient medically cleared for above planned procedure.

## 2021-03-02 ENCOUNTER — ANESTHESIA EVENT (OUTPATIENT)
Dept: OPERATING ROOM | Age: 71
End: 2021-03-02
Payer: COMMERCIAL

## 2021-03-04 ENCOUNTER — TELEPHONE (OUTPATIENT)
Dept: CARDIOLOGY CLINIC | Age: 71
End: 2021-03-04

## 2021-03-04 ENCOUNTER — IMMUNIZATION (OUTPATIENT)
Dept: PRIMARY CARE CLINIC | Age: 71
End: 2021-03-04
Payer: COMMERCIAL

## 2021-03-04 PROCEDURE — 0012A PR IMM ADMN SARSCOV2 100 MCG/0.5 ML 2ND DOSE: CPT | Performed by: FAMILY MEDICINE

## 2021-03-04 PROCEDURE — 91301 COVID-19, MODERNA VACCINE 100MCG/0.5ML DOSE: CPT | Performed by: FAMILY MEDICINE

## 2021-03-04 NOTE — PROGRESS NOTES
Obstructive Sleep Apnea (DENISE) Screening     Patient:  Maxi Byrd    YOB: 1950      Medical Record #:  [id number]                     Date:  3/4/2021     1. Are you a loud and/or regular snorer? [x]  Yes       [] No    2. Have you been observed to gasp or stop breathing during sleep? []  Yes       [x] No    3. Do you feel tired or groggy upon awakening or do you awaken with a headache?           []  Yes       [x] No    4. Are you often tired or fatigued during the wake time hours? []  Yes       [x] No    5. Do you fall asleep sitting, reading, watching TV or driving? []  Yes       [x] No    6. Do you often have problems with memory or concentration? []  Yes       [x] No    If patient's DENISE score if greater than or equal to 3, they are considered high risk for DENISE. An anesthesia provider will evaluate the patient and develop a plan of care the day of surgery. Note:  If the patient's BMI is more than 35 kg m¯² , has neck circumference > 40 cm, and/or high blood pressure the risk is greater (© American Sleep [employer]nea Association, 2006).

## 2021-03-04 NOTE — LETTER
[address] Cardiology - [address]  Phone: [phone]  Fax: [phone]    Petar Mccauley MD        March 5, 2021      Diane Rodriguez 1950 is at low cardiovascular risk for non cardiac surgery.         Sincerely,        Petar Mccauley MD

## 2021-03-04 NOTE — PROGRESS NOTES
Preoperative Screening for Elective Surgery/Invasive Procedures While COVID-19 present in the community    ? Have you had any of the following symptoms? o Fever, chills  o Cough  o Shortness of breath  o Muscle aches/pain  o Diarrhea  o Abdominal pain, nausea, vomiting  o Loss or decrease in taste and / or smell  ? Risk of Exposure  o Have you recently been hospitalized for COVID-19 or flu-like illness, if so when?  o Recently diagnosed with COVID-19, if so when?  o Recently tested for COVID-19, if so when?  o Have you been in close contact with a person or family member who currently has or recently had COVID-23? If yes, when and in what context?  o Do you live with anybody who in the last 14 days has had fever, chills, shortness of breath, muscle aches, flu-like illness?  o Do you have any close contacts or family members who are currently in the hospital for COVID-19 or flu-like illness? If yes, assess recent close contact with this person. Indicate if the patient has a positive screen by answering yes to one or more of the above questions. Patients who test positive or screen positive prior to surgery or on the day of surgery should be evaluated in conjunction with the surgeon/proceduralist/anesthesiologist to determine the urgency of the procedure.      Pt denies all

## 2021-03-04 NOTE — TELEPHONE ENCOUNTER
CARDIAC CLEARANCE REQUEST    What type of procedure are you having: wide local excision of left ear lesion with frozen sections,reconstruction    Are you taking any blood thinners: please advise if pt should hold any medications    When is your procedure scheduled for:3-12-21    What physician is performing your procedure: Dr. Adia Kaplan    Phone Number: n/a    Fax number to send the letter: fax letter to [facility] at [phone]    Pt last saw NPBB on 1-8-21

## 2021-03-05 ENCOUNTER — TELEPHONE (OUTPATIENT)
Dept: CARDIOLOGY CLINIC | Age: 71
End: 2021-03-05

## 2021-03-05 NOTE — TELEPHONE ENCOUNTER
Beth Perkins from [address] PAT only needs Cardiac Clearance letter placed on pt chart for his procedure. Does not need any meds held.  TY

## 2021-03-05 NOTE — TELEPHONE ENCOUNTER
Dr. Mookie Calabrese - Pt having surgery 3/12/21, wide local excision of left ear lesion with frozen sections,reconstruction. Surgeon just needs the ok from you, no medications need to be held. Last OV with TYRA 08/2020. Patient saw NPBB 01/2021.   TY

## 2021-03-08 ENCOUNTER — OFFICE VISIT (OUTPATIENT)
Dept: PRIMARY CARE CLINIC | Age: 71
End: 2021-03-08
Payer: COMMERCIAL

## 2021-03-08 DIAGNOSIS — Z01.818 PREOP TESTING: Primary | ICD-10-CM

## 2021-03-08 PROCEDURE — 99211 OFF/OP EST MAY X REQ PHY/QHP: CPT | Performed by: NURSE PRACTITIONER

## 2021-03-08 NOTE — PATIENT INSTRUCTIONS
Advance Care Planning  People with COVID-19 may have no symptoms, mild symptoms, such as fever, cough, and shortness of breath or they may have more severe illness, developing severe and fatal pneumonia. As a result, Advance Care Planning with attention to naming a health care decision maker (someone you trust to make healthcare decisions for you if you could not speak for yourself) and sharing other health care preferences is important BEFORE a possible health crisis. Please contact your Primary Care Provider to discuss Advance Care Planning. Preventing the Spread of Coronavirus Disease 2019 in Homes and Residential Communities  For the most recent information go to [employer]    Prevention steps for People with confirmed or suspected COVID-19 (including persons under investigation) who do not need to be hospitalized  and   People with confirmed COVID-19 who were hospitalized and determined to be medically stable to go home    Your healthcare provider and public health staff will evaluate whether you can be cared for at home. If it is determined that you do not need to be hospitalized and can be isolated at home, you will be monitored by staff from your local or state health department. You should follow the prevention steps below until a healthcare provider or local or state health department says you can return to your normal activities. Stay home except to get medical care  People who are mildly ill with COVID-19 are able to isolate at home during their illness. You should restrict activities outside your home, except for getting medical care. Do not go to work, school, or public areas. Avoid using public transportation, ride-sharing, or taxis. Separate yourself from other people and animals in your home  People: As much as possible, you should stay in a specific room and away from other people in your home.  Also, you should use a separate before eating or preparing food. If soap and water are not readily available, use an alcohol-based hand  with at least 60% alcohol, covering all surfaces of your hands and rubbing them together until they feel dry. Soap and water are the best option if hands are visibly dirty. Avoid touching your eyes, nose, and mouth with unwashed hands. Avoid sharing personal household items  You should not share dishes, drinking glasses, cups, eating utensils, towels, or bedding with other people or pets in your home. After using these items, they should be washed thoroughly with soap and water. Clean all high-touch surfaces everyday  High touch surfaces include counters, tabletops, doorknobs, bathroom fixtures, toilets, phones, keyboards, tablets, and bedside tables. Also, clean any surfaces that may have blood, stool, or body fluids on them. Use a household cleaning spray or wipe, according to the label instructions. Labels contain instructions for safe and effective use of the cleaning product including precautions you should take when applying the product, such as wearing gloves and making sure you have good ventilation during use of the product. Monitor your symptoms  Seek prompt medical attention if your illness is worsening (e.g., difficulty breathing). Before seeking care, call your healthcare provider and tell them that you have, or are being evaluated for, COVID-19. Put on a facemask before you enter the facility. These steps will help the healthcare providers office to keep other people in the office or waiting room from getting infected or exposed. Ask your healthcare provider to call the local or state health department. Persons who are placed under active monitoring or facilitated self-monitoring should follow instructions provided by their local health department or occupational health professionals, as appropriate. When working with your local health department check their available hours.   If you have a medical emergency and need to call 911, notify the dispatch personnel that you have, or are being evaluated for COVID-19. If possible, put on a facemask before emergency medical services arrive. Discontinuing home isolation  Patients with confirmed COVID-19 should remain under home isolation precautions until the risk of secondary transmission to others is thought to be low. The decision to discontinue home isolation precautions should be made on a case-by-case basis, in consultation with healthcare providers and state and local health departments.

## 2021-03-08 NOTE — PROGRESS NOTES
Kwasi Pascual received a viral test for COVID-19. They were educated on isolation and quarantine as appropriate. For any symptoms, they were directed to seek care from their PCP, given contact information to establish with a doctor, directed to an urgent care or the emergency room.

## 2021-03-09 LAB — SARS-COV-2: NOT DETECTED

## 2021-03-12 ENCOUNTER — HOSPITAL ENCOUNTER (OUTPATIENT)
Age: 71
Setting detail: OUTPATIENT SURGERY
Discharge: HOME | End: 2021-03-12
Attending: OTOLARYNGOLOGY | Admitting: OTOLARYNGOLOGY
Payer: COMMERCIAL

## 2021-03-12 ENCOUNTER — ANESTHESIA (OUTPATIENT)
Dept: OPERATING ROOM | Age: 71
End: 2021-03-12
Payer: COMMERCIAL

## 2021-03-12 VITALS
BODY MASS INDEX: 35.03 KG/M2 | SYSTOLIC BLOOD PRESSURE: 107 MMHG | RESPIRATION RATE: 18 BRPM | HEART RATE: 88 BPM | OXYGEN SATURATION: 98 % | HEIGHT: 66 IN | DIASTOLIC BLOOD PRESSURE: 55 MMHG | TEMPERATURE: 97.5 F | WEIGHT: 218 LBS

## 2021-03-12 VITALS
OXYGEN SATURATION: 85 % | DIASTOLIC BLOOD PRESSURE: 103 MMHG | SYSTOLIC BLOOD PRESSURE: 161 MMHG | RESPIRATION RATE: 2 BRPM | TEMPERATURE: 98.2 F

## 2021-03-12 DIAGNOSIS — C44.229: ICD-10-CM

## 2021-03-12 LAB
ANION GAP SERPL CALCULATED.3IONS-SCNC: 10 MMOL/L (ref 3–16)
BUN BLDV-MCNC: 16 MG/DL (ref 7–20)
CALCIUM SERPL-MCNC: 9.4 MG/DL (ref 8.3–10.6)
CHLORIDE BLD-SCNC: 105 MMOL/L (ref 99–110)
CO2: 27 MMOL/L (ref 21–32)
CREAT SERPL-MCNC: 1 MG/DL (ref 0.8–1.3)
GFR AFRICAN AMERICAN: >60
GFR NON-AFRICAN AMERICAN: >60
GLUCOSE BLD-MCNC: 114 MG/DL (ref 70–99)
POTASSIUM REFLEX MAGNESIUM: 3.9 MMOL/L (ref 3.5–5.1)
SODIUM BLD-SCNC: 142 MMOL/L (ref 136–145)

## 2021-03-12 PROCEDURE — 3700000000 HC ANESTHESIA ATTENDED CARE: Performed by: OTOLARYNGOLOGY

## 2021-03-12 PROCEDURE — 3600000012 HC SURGERY LEVEL 2 ADDTL 15MIN: Performed by: OTOLARYNGOLOGY

## 2021-03-12 PROCEDURE — 7100000000 HC PACU RECOVERY - FIRST 15 MIN: Performed by: OTOLARYNGOLOGY

## 2021-03-12 PROCEDURE — 2500000003 HC RX 250 WO HCPCS: Performed by: NURSE ANESTHETIST, CERTIFIED REGISTERED

## 2021-03-12 PROCEDURE — 6360000002 HC RX W HCPCS: Performed by: NURSE ANESTHETIST, CERTIFIED REGISTERED

## 2021-03-12 PROCEDURE — 88305 TISSUE EXAM BY PATHOLOGIST: CPT

## 2021-03-12 PROCEDURE — 7100000001 HC PACU RECOVERY - ADDTL 15 MIN: Performed by: OTOLARYNGOLOGY

## 2021-03-12 PROCEDURE — 7100000011 HC PHASE II RECOVERY - ADDTL 15 MIN: Performed by: OTOLARYNGOLOGY

## 2021-03-12 PROCEDURE — 3600000002 HC SURGERY LEVEL 2 BASE: Performed by: OTOLARYNGOLOGY

## 2021-03-12 PROCEDURE — 11642 EXC F/E/E/N/L MAL+MRG 1.1-2: CPT | Performed by: OTOLARYNGOLOGY

## 2021-03-12 PROCEDURE — 2709999900 HC NON-CHARGEABLE SUPPLY: Performed by: OTOLARYNGOLOGY

## 2021-03-12 PROCEDURE — 6370000000 HC RX 637 (ALT 250 FOR IP): Performed by: OTOLARYNGOLOGY

## 2021-03-12 PROCEDURE — 2580000003 HC RX 258: Performed by: NURSE ANESTHETIST, CERTIFIED REGISTERED

## 2021-03-12 PROCEDURE — 7100000010 HC PHASE II RECOVERY - FIRST 15 MIN: Performed by: OTOLARYNGOLOGY

## 2021-03-12 PROCEDURE — 88331 PATH CONSLTJ SURG 1 BLK 1SPC: CPT

## 2021-03-12 PROCEDURE — 15260 FTH/GFT FR N/E/E/L 20 SQCM/<: CPT | Performed by: OTOLARYNGOLOGY

## 2021-03-12 PROCEDURE — 2500000003 HC RX 250 WO HCPCS: Performed by: OTOLARYNGOLOGY

## 2021-03-12 PROCEDURE — 2500000003 HC RX 250 WO HCPCS: Performed by: ANESTHESIOLOGY

## 2021-03-12 PROCEDURE — 2580000003 HC RX 258: Performed by: ANESTHESIOLOGY

## 2021-03-12 PROCEDURE — 3700000001 HC ADD 15 MINUTES (ANESTHESIA): Performed by: OTOLARYNGOLOGY

## 2021-03-12 PROCEDURE — 2580000003 HC RX 258: Performed by: OTOLARYNGOLOGY

## 2021-03-12 PROCEDURE — 80048 BASIC METABOLIC PNL TOTAL CA: CPT

## 2021-03-12 PROCEDURE — 6360000002 HC RX W HCPCS: Performed by: OTOLARYNGOLOGY

## 2021-03-12 RX ORDER — BACITRACIN, NEOMYCIN, POLYMYXIN B 400; 3.5; 5 [USP'U]/G; MG/G; [USP'U]/G
OINTMENT TOPICAL
Qty: 14.17 G | Refills: 1 | Status: SHIPPED | OUTPATIENT
Start: 2021-03-12 | End: 2021-03-22

## 2021-03-12 RX ORDER — OXYCODONE HYDROCHLORIDE AND ACETAMINOPHEN 5; 325 MG/1; MG/1
2 TABLET ORAL PRN
Status: DISCONTINUED | OUTPATIENT
Start: 2021-03-12 | End: 2021-03-12 | Stop reason: HOSPADM

## 2021-03-12 RX ORDER — LABETALOL HYDROCHLORIDE 5 MG/ML
5 INJECTION, SOLUTION INTRAVENOUS
Status: DISCONTINUED | OUTPATIENT
Start: 2021-03-12 | End: 2021-03-12 | Stop reason: HOSPADM

## 2021-03-12 RX ORDER — MAGNESIUM HYDROXIDE 1200 MG/15ML
LIQUID ORAL CONTINUOUS PRN
Status: COMPLETED | OUTPATIENT
Start: 2021-03-12 | End: 2021-03-12

## 2021-03-12 RX ORDER — FENTANYL CITRATE 50 UG/ML
INJECTION, SOLUTION INTRAMUSCULAR; INTRAVENOUS PRN
Status: DISCONTINUED | OUTPATIENT
Start: 2021-03-12 | End: 2021-03-12 | Stop reason: SDUPTHER

## 2021-03-12 RX ORDER — ONDANSETRON 2 MG/ML
4 INJECTION INTRAMUSCULAR; INTRAVENOUS EVERY 30 MIN PRN
Status: DISCONTINUED | OUTPATIENT
Start: 2021-03-12 | End: 2021-03-12 | Stop reason: HOSPADM

## 2021-03-12 RX ORDER — PROPOFOL 10 MG/ML
INJECTION, EMULSION INTRAVENOUS PRN
Status: DISCONTINUED | OUTPATIENT
Start: 2021-03-12 | End: 2021-03-12 | Stop reason: SDUPTHER

## 2021-03-12 RX ORDER — OXYCODONE HYDROCHLORIDE 5 MG/1
5 TABLET ORAL EVERY 6 HOURS PRN
Qty: 12 TABLET | Refills: 0 | Status: SHIPPED | OUTPATIENT
Start: 2021-03-12 | End: 2021-03-15

## 2021-03-12 RX ORDER — ACETAMINOPHEN 500 MG
500 TABLET ORAL 4 TIMES DAILY PRN
Qty: 120 TABLET | Refills: 0 | Status: SHIPPED | OUTPATIENT
Start: 2021-03-12 | End: 2021-05-05

## 2021-03-12 RX ORDER — LIDOCAINE HYDROCHLORIDE 20 MG/ML
INJECTION, SOLUTION EPIDURAL; INFILTRATION; INTRACAUDAL; PERINEURAL PRN
Status: DISCONTINUED | OUTPATIENT
Start: 2021-03-12 | End: 2021-03-12 | Stop reason: SDUPTHER

## 2021-03-12 RX ORDER — SODIUM CHLORIDE 0.9 % (FLUSH) 0.9 %
10 SYRINGE (ML) INJECTION EVERY 12 HOURS SCHEDULED
Status: DISCONTINUED | OUTPATIENT
Start: 2021-03-12 | End: 2021-03-12 | Stop reason: HOSPADM

## 2021-03-12 RX ORDER — DEXAMETHASONE SODIUM PHOSPHATE 4 MG/ML
INJECTION, SOLUTION INTRA-ARTICULAR; INTRALESIONAL; INTRAMUSCULAR; INTRAVENOUS; SOFT TISSUE PRN
Status: DISCONTINUED | OUTPATIENT
Start: 2021-03-12 | End: 2021-03-12 | Stop reason: SDUPTHER

## 2021-03-12 RX ORDER — LIDOCAINE HYDROCHLORIDE AND EPINEPHRINE 10; 10 MG/ML; UG/ML
INJECTION, SOLUTION INFILTRATION; PERINEURAL PRN
Status: DISCONTINUED | OUTPATIENT
Start: 2021-03-12 | End: 2021-03-12 | Stop reason: ALTCHOICE

## 2021-03-12 RX ORDER — SODIUM CHLORIDE, SODIUM LACTATE, POTASSIUM CHLORIDE, CALCIUM CHLORIDE 600; 310; 30; 20 MG/100ML; MG/100ML; MG/100ML; MG/100ML
INJECTION, SOLUTION INTRAVENOUS CONTINUOUS
Status: DISCONTINUED | OUTPATIENT
Start: 2021-03-12 | End: 2021-03-12 | Stop reason: HOSPADM

## 2021-03-12 RX ORDER — HYDRALAZINE HYDROCHLORIDE 20 MG/ML
5 INJECTION INTRAMUSCULAR; INTRAVENOUS EVERY 30 MIN PRN
Status: DISCONTINUED | OUTPATIENT
Start: 2021-03-12 | End: 2021-03-12 | Stop reason: HOSPADM

## 2021-03-12 RX ORDER — ROCURONIUM BROMIDE 10 MG/ML
INJECTION, SOLUTION INTRAVENOUS PRN
Status: DISCONTINUED | OUTPATIENT
Start: 2021-03-12 | End: 2021-03-12 | Stop reason: SDUPTHER

## 2021-03-12 RX ORDER — ONDANSETRON 2 MG/ML
INJECTION INTRAMUSCULAR; INTRAVENOUS PRN
Status: DISCONTINUED | OUTPATIENT
Start: 2021-03-12 | End: 2021-03-12 | Stop reason: SDUPTHER

## 2021-03-12 RX ORDER — OXYCODONE HYDROCHLORIDE AND ACETAMINOPHEN 5; 325 MG/1; MG/1
1 TABLET ORAL PRN
Status: DISCONTINUED | OUTPATIENT
Start: 2021-03-12 | End: 2021-03-12 | Stop reason: HOSPADM

## 2021-03-12 RX ORDER — DIPHENHYDRAMINE HYDROCHLORIDE 50 MG/ML
6.25 INJECTION INTRAMUSCULAR; INTRAVENOUS
Status: DISCONTINUED | OUTPATIENT
Start: 2021-03-12 | End: 2021-03-12 | Stop reason: HOSPADM

## 2021-03-12 RX ORDER — CEPHALEXIN 500 MG/1
500 CAPSULE ORAL 2 TIMES DAILY
Qty: 14 CAPSULE | Refills: 0 | Status: SHIPPED | OUTPATIENT
Start: 2021-03-12 | End: 2021-03-19

## 2021-03-12 RX ORDER — EPHEDRINE SULFATE 50 MG/ML
INJECTION, SOLUTION INTRAVENOUS PRN
Status: DISCONTINUED | OUTPATIENT
Start: 2021-03-12 | End: 2021-03-12 | Stop reason: SDUPTHER

## 2021-03-12 RX ORDER — MEPERIDINE HYDROCHLORIDE 50 MG/ML
12.5 INJECTION INTRAMUSCULAR; INTRAVENOUS; SUBCUTANEOUS EVERY 5 MIN PRN
Status: DISCONTINUED | OUTPATIENT
Start: 2021-03-12 | End: 2021-03-12 | Stop reason: HOSPADM

## 2021-03-12 RX ORDER — SODIUM CHLORIDE 0.9 % (FLUSH) 0.9 %
10 SYRINGE (ML) INJECTION PRN
Status: DISCONTINUED | OUTPATIENT
Start: 2021-03-12 | End: 2021-03-12 | Stop reason: HOSPADM

## 2021-03-12 RX ORDER — MIDAZOLAM HYDROCHLORIDE 1 MG/ML
INJECTION INTRAMUSCULAR; INTRAVENOUS PRN
Status: DISCONTINUED | OUTPATIENT
Start: 2021-03-12 | End: 2021-03-12 | Stop reason: SDUPTHER

## 2021-03-12 RX ADMIN — SODIUM CHLORIDE, SODIUM LACTATE, POTASSIUM CHLORIDE, AND CALCIUM CHLORIDE: .6; .31; .03; .02 INJECTION, SOLUTION INTRAVENOUS at 06:24

## 2021-03-12 RX ADMIN — DEXAMETHASONE SODIUM PHOSPHATE 8 MG: 4 INJECTION, SOLUTION INTRAMUSCULAR; INTRAVENOUS at 08:22

## 2021-03-12 RX ADMIN — CEFAZOLIN SODIUM 2 G: 10 INJECTION, POWDER, FOR SOLUTION INTRAVENOUS at 07:45

## 2021-03-12 RX ADMIN — ROCURONIUM BROMIDE 50 MG: 10 SOLUTION INTRAVENOUS at 07:40

## 2021-03-12 RX ADMIN — FAMOTIDINE 20 MG: 10 INJECTION, SOLUTION INTRAVENOUS at 06:46

## 2021-03-12 RX ADMIN — EPHEDRINE SULFATE 10 MG: 50 INJECTION INTRAMUSCULAR; INTRAVENOUS; SUBCUTANEOUS at 08:08

## 2021-03-12 RX ADMIN — PHENYLEPHRINE HYDROCHLORIDE 40 MCG/MIN: 10 INJECTION INTRAVENOUS at 08:08

## 2021-03-12 RX ADMIN — SUGAMMADEX 200 MG: 100 INJECTION, SOLUTION INTRAVENOUS at 09:15

## 2021-03-12 RX ADMIN — EPHEDRINE SULFATE 10 MG: 50 INJECTION INTRAMUSCULAR; INTRAVENOUS; SUBCUTANEOUS at 07:49

## 2021-03-12 RX ADMIN — MIDAZOLAM HYDROCHLORIDE 2 MG: 2 INJECTION, SOLUTION INTRAMUSCULAR; INTRAVENOUS at 07:28

## 2021-03-12 RX ADMIN — FENTANYL CITRATE 75 MCG: 50 INJECTION, SOLUTION INTRAMUSCULAR; INTRAVENOUS at 07:40

## 2021-03-12 RX ADMIN — LIDOCAINE HYDROCHLORIDE 50 MG: 20 INJECTION, SOLUTION EPIDURAL; INFILTRATION; INTRACAUDAL; PERINEURAL at 07:40

## 2021-03-12 RX ADMIN — ONDANSETRON 4 MG: 2 INJECTION INTRAMUSCULAR; INTRAVENOUS at 08:22

## 2021-03-12 RX ADMIN — PROPOFOL 200 MG: 10 INJECTION, EMULSION INTRAVENOUS at 07:40

## 2021-03-12 ASSESSMENT — PULMONARY FUNCTION TESTS
PIF_VALUE: 25
PIF_VALUE: 28
PIF_VALUE: 25
PIF_VALUE: 25
PIF_VALUE: 28
PIF_VALUE: 3
PIF_VALUE: 0
PIF_VALUE: 28
PIF_VALUE: 27
PIF_VALUE: 25
PIF_VALUE: 20
PIF_VALUE: 25
PIF_VALUE: 25
PIF_VALUE: 28
PIF_VALUE: 25
PIF_VALUE: 25
PIF_VALUE: 26
PIF_VALUE: 27
PIF_VALUE: 2
PIF_VALUE: 28
PIF_VALUE: 28
PIF_VALUE: 25
PIF_VALUE: 28
PIF_VALUE: 25
PIF_VALUE: 25
PIF_VALUE: 31
PIF_VALUE: 25
PIF_VALUE: 25
PIF_VALUE: 17
PIF_VALUE: 25
PIF_VALUE: 29
PIF_VALUE: 26
PIF_VALUE: 22
PIF_VALUE: 0
PIF_VALUE: 29
PIF_VALUE: 28
PIF_VALUE: 25
PIF_VALUE: 25
PIF_VALUE: 28
PIF_VALUE: 25
PIF_VALUE: 0
PIF_VALUE: 36
PIF_VALUE: 20
PIF_VALUE: 25
PIF_VALUE: 28
PIF_VALUE: 28
PIF_VALUE: 26
PIF_VALUE: 26
PIF_VALUE: 29
PIF_VALUE: 26
PIF_VALUE: 26
PIF_VALUE: 5
PIF_VALUE: 24
PIF_VALUE: 1
PIF_VALUE: 21
PIF_VALUE: 29
PIF_VALUE: 28
PIF_VALUE: 25
PIF_VALUE: 18
PIF_VALUE: 33
PIF_VALUE: 28
PIF_VALUE: 25
PIF_VALUE: 24
PIF_VALUE: 25
PIF_VALUE: 29
PIF_VALUE: 25
PIF_VALUE: 28
PIF_VALUE: 2
PIF_VALUE: 29
PIF_VALUE: 29
PIF_VALUE: 25
PIF_VALUE: 28
PIF_VALUE: 1
PIF_VALUE: 25

## 2021-03-12 ASSESSMENT — PAIN - FUNCTIONAL ASSESSMENT: PAIN_FUNCTIONAL_ASSESSMENT: 0-10

## 2021-03-12 NOTE — PROGRESS NOTES
Admit to PACU. Report received from anesthesia and OR nurse. . Oral airway in place on arrival-removed by Zuhair Hilliard CRNA. Nasal cannula on at 4 liters initialyy-taped to face to avoid pressure on surgical site left ear.

## 2021-03-12 NOTE — H&P
[address] UP VISIT        Patient Name: Tyler Pineda Record Number:  [id number]  Primary Care Physician:  Jazlyn Sanchez MD     ChiefComplaint           Chief Complaint   Patient presents with    Post-Op Check       Biopsy of left ear on 02/03/2021. States ear \"seems to be healing\" since last visit.          History of Present Illness      Jeronimo Dunbar is an 69 y. o. male referred for left ear lesion.  This is been present for the past 3 weeks, he first noticed it as a small bump which has since grown in size.  He think that it, with scant amount of bleeding, however has been stable for the past 1.5 weeks.  Denies any purulent drainage, tenderness on palpation, neck lymphadenopathy.  No fevers, chills, night sweats, dysphagia/odynophagia, other skin lesions.  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  No family history of head and neck cancer/cutaneous malignancy.  No history of immunocompromise or uncontrolled diabetes. Lillian Bruce is not currently on any blood thinners, although he has a pacemaker for left bundle branch block.     Interval History 2/10/2021: Biopsy returned positive for well-differentiated squamous cell carcinoma. Patient states that since using topical antibiotics his inflammation has markedly improved. No drainage, lymphadenopathy.      Interval History 3/12/2021: No changes to history     Past Medical History      Past Medical History        Past Medical History:   Diagnosis Date    Anxiety 12/21/2012    Hyperlipidemia      Hypertension      Hypertrophy of prostate without urinary obstruction      Mild mitral regurgitation 3/6/2018    Moderate aortic insufficiency 3/6/2018            Past Surgical History      Past Surgical History         Past Surgical History:   Procedure Laterality Date    CARDIAC PACEMAKER PLACEMENT   04/19/2019    CATARACT REMOVAL WITH IMPLANT Left 05/01/2019    COLONOSCOPY   4/21/2008    INTRACAPSULAR CATARACT EXTRACTION Left 5/1/2019     PHACO EMULSIFICATION OF CATARACT WITH  INTRAOCULAR LENS IMPLANT performed by Elsi Carias MD at [facility] VITRECTOMY Left 2018            Family History      Family History         Family History   Problem Relation Age of Onset    Cancer Brother 62         prostate cancer    Other Mother           Lung fibrosis    Cancer Father           Prostate cancer    No Known Problems Sister              Social History      Social History            Tobacco Use    Smoking status: Never Smoker    Smokeless tobacco: Never Used   Substance Use Topics    Alcohol use: Yes       Comment: rarely    Drug use:  No         Allergies      No Known Allergies     Medications      Current Facility-Administered Medications          Current Outpatient Medications   Medication Sig Dispense Refill    telmisartan-hydrochlorothiazide (MICARDIS HCT) 80-25 MG per tablet TAKE ONE TABLET DAILY 90 tablet 3    pravastatin (PRAVACHOL) 80 MG tablet TAKE ONE TABLET DAILY AT BEDTIME 90 tablet 3    venlafaxine (EFFEXOR XR) 75 MG extended release capsule Take one tablet daily 90 capsule 3    escitalopram (LEXAPRO) 10 MG tablet Take 1 tablet by mouth daily 90 tablet 3      No current facility-administered medications for this visit.             Review of Systems      REVIEW OF SYSTEMS  The following systems were reviewed and revealed the following in addition to any already discussed in the HPI:     CONSTITUTIONAL: No weight loss, no fever, no night sweats, no chills  EYES: no vision changes, no blurry vision  EARS: no changes in hearing, no otalgia  NOSE: no epistaxis, no rhinorrhea  RESPIRATORY: No difficulty breathing, no shortness of breath  CV: no chest pain, no peripheral vascular disease  HEME: No coagulation disorder, no bleeding disorder  NEURO: No TIA or stroke-like symptoms  SKIN: No new rashes in the head and neck, no recent skin cancers  MOUTH: No new ulcers, no recent teeth infections  GASTROINTESTINAL: No diarrhea, stomach pain  PSYCH: No anxiety, no depression     PhysicalExam      Vitals       Vitals:     02/10/21 1055   Temp: 97.2 °F (36.2 °C)   TempSrc: Infrared   Weight: 220 lb (99.8 kg)   Height: 5' 6\" (1.676 m)            PHYSICAL EXAM  Temp 97.2 °F (36.2 °C) (Infrared)   Ht 5' 6\" (1.676 m)   Wt 220 lb (99.8 kg)   BMI 35.51 kg/m²      GENERAL: No Acute Distress, Alert and Oriented, no hoarseness  EYES: EOMI, Anti-icteric  NOSE: On anterior rhinoscopy there is no epistaxis, nasal mucosa within normal limits, no purulent drainage  EARS: Normal external appearance of the right ear.  On the left antihelix just inferior to the allie cymba there is a circular lesion, raised, 10mm in diameter with telangiectatic change of the skin  FACE: 1/6 House-Brackmann Scale, symmetric, sensation equal bilaterally  ORAL CAVITY: No masses or lesions palpated, uvula is midline, moist mucous membranes, 1+ tonsils, dentition with multiple areas of fractures and caries  NECK: Normal range of motion, no thyromegaly, trachea is midline, no lymphadenopathy, no neck masses, no crepitus  CHEST: Normal respiratory effort, no retractions, breathing comfortably  SKIN: Scattered areas of actinic keratosis, none overt lesions suspicious for cutaneous neoplasm  NEURO: CN 2, 3, 4, 5, 6, 7, 11, 12 intact bilaterally      Prior images:           Procedure      none     Assessment and Plan      1.  Squamous cell carcinoma, ear, left  Patient with left ear squamous cell carcinoma, well differentiated, [id number].  We discussed options including observation, topical therapy or excision with frozen sections and reconstruction and the patient is in agreement with plan for surgical excision with reconstruction  Given the above history and the patient's desire for surgery, they are a candidate for wide local excision of the left ear with plan for local flap reconstruction/adjacent tissue transfer, full thickness skin graft or cartilage grafting  -Risks and benefits of the above procedure include incomplete excision of the lesion/recurrence, cosmesis, pain, inflammation, infection, hemorrhage, hematoma/seroma outlined and patient agreed to proceed  -General anesthesia carries its own risks, which will be discussed with the Anesthesiology team on day of surgery  -PCP clearance obtained 2/17/2021  -Negative COVID testing noted    RTC 1 week after procedure     Leah Kennedy MD  [address] [place] [address] Floor  Department of Otolaryngology/Head and Neck Surgery  2/10/21     I have performed a head and neck physical exam personally or was physically present during the key or critical portions of the service.     Medical Decision Making: The following items were considered in medical decision making:  Independent review of images  Review / order clinical lab tests  Review / order radiology tests  Decision to obtain old records  Review and summation of old records as accessed through [facility] (a summary of my findings in these old records: none)      Portions of this note were dictated using Dragon.  There may be linguistic errors secondary to the use of this program.

## 2021-03-12 NOTE — PROGRESS NOTES
arera around ear is numb-denies pain. Drainage from incision anterior to ear. Area cleansed with saline and patted dry. Noted swelling at jaw line with some bruising. Dr. Vicky Huber saw incision and swelling. OK to discharge. Wife given instructions over phone and questions answered. IV removed and discharged to car by wheelchair.

## 2021-03-12 NOTE — ANESTHESIA PRE PROCEDURE
Department of Anesthesiology  Preprocedure Note       Name:  Claire Majano   Age:  69 y.o.  :  1950                                          MRN:  [id number]         Date:  3/11/2021      Surgeon: Alfredo Burns):  Danielle Saunders MD    Procedure: Procedure(s):  WIDE LOCAL EXCISION OF LEFT EAR LESION WITH FROZEN SECTIONS, RECONSTRUCTION  WITH POSSIBLE FULL THICKNESS SKIN GRAFT, LOCAL FLAP, CARTILAGE GRAFT    Medications prior to admission:   Prior to Admission medications    Medication Sig Start Date End Date Taking? Authorizing Provider   telmisartan-hydrochlorothiazide (MICARDIS HCT) 80-25 MG per tablet TAKE ONE TABLET DAILY 21   Bartolo Stark MD   pravastatin (PRAVACHOL) 80 MG tablet TAKE ONE TABLET DAILY AT BEDTIME 21   Bartolo Stark MD   venlafaxine (EFFEXOR XR) 75 MG extended release capsule Take one tablet daily 21   Bartolo Stark MD   escitalopram (LEXAPRO) 10 MG tablet Take 1 tablet by mouth daily 20   Bartolo Stark MD       Current medications:    No current facility-administered medications for this encounter.       Current Outpatient Medications   Medication Sig Dispense Refill    telmisartan-hydrochlorothiazide (MICARDIS HCT) 80-25 MG per tablet TAKE ONE TABLET DAILY 90 tablet 3    pravastatin (PRAVACHOL) 80 MG tablet TAKE ONE TABLET DAILY AT BEDTIME 90 tablet 3    venlafaxine (EFFEXOR XR) 75 MG extended release capsule Take one tablet daily 90 capsule 3    escitalopram (LEXAPRO) 10 MG tablet Take 1 tablet by mouth daily 90 tablet 3       Allergies:  No Known Allergies    Problem List:    Patient Active Problem List   Diagnosis Code    Hypertension I10    Hyperlipidemia E78.5    Hypertrophy of prostate without urinary obstruction N40.0    Anxiety F41.9    Moderate aortic insufficiency I35.1    Mild mitral regurgitation I34.0    Pacemaker Z95.0    LBBB (left bundle branch block) I44.7    Complete heart block (HCC) I44.2    Skin lesion of left external ear H61.91       Past Medical History:        Diagnosis Date    Anxiety 12/21/2012    Hyperlipidemia     Hypertension     Hypertrophy of prostate without urinary obstruction     Mild mitral regurgitation 3/6/2018    Moderate aortic insufficiency 3/6/2018       Past Surgical History:        Procedure Laterality Date    CARDIAC PACEMAKER PLACEMENT  04/19/2019    CATARACT REMOVAL WITH IMPLANT Left 05/01/2019    COLONOSCOPY  4/21/2008    INTRACAPSULAR CATARACT EXTRACTION Left 5/1/2019    PHACO EMULSIFICATION OF CATARACT WITH  INTRAOCULAR LENS IMPLANT performed [facility]anna [facility] at [facility] 10  2019    VITRECTOMY Left 2018       Social History:    Social History     Tobacco Use    Smoking status: Never Smoker    Smokeless tobacco: Never Used   Substance Use Topics    Alcohol use: Yes     Comment: rarely                                Counseling given: Not Answered      Vital Signs (Current):   Vitals:    03/04/21 1157   Weight: 220 lb (99.8 kg)   Height: 5' 6\" (1.676 m)                                              BP Readings from Last 3 Encounters:   02/17/21 136/80   02/03/21 (!) 143/71   02/02/21 120/80       NPO Status:                                                                                 BMI:   Wt Readings from Last 3 Encounters:   02/17/21 227 lb (103 kg)   02/10/21 220 lb (99.8 kg)   02/03/21 227 lb (103 kg)     Body mass index is 35.51 kg/m².     CBC:   Lab Results   Component Value Date    WBC 8.1 02/02/2021    RBC 5.20 02/02/2021    HGB 15.6 02/02/2021    HCT 46.9 02/02/2021    MCV 90.2 02/02/2021    RDW 13.6 02/02/2021     02/02/2021       CMP:   Lab Results   Component Value Date     02/02/2021    K 3.9 02/02/2021    K 4.0 04/20/2019     02/02/2021    CO2 28 02/02/2021    BUN 16 02/02/2021    CREATININE 0.9 02/02/2021    GFRAA >60 02/02/2021    AGRATIO 1.5 02/02/2021    LABGLOM >60 02/02/2021    LABGLOM 85 07/19/2016 GLUCOSE 89 02/02/2021    GLUCOSE 73 10/21/2011    PROT 7.5 02/02/2021    PROT 7.2 02/26/2013    CALCIUM 10.2 02/02/2021    BILITOT 0.5 02/02/2021    ALKPHOS 65 02/02/2021    AST 24 02/02/2021    ALT 20 02/02/2021       POC Tests: No results for input(s): POCGLU, POCNA, POCK, POCCL, POCBUN, POCHEMO, POCHCT in the last 72 hours. Coags: No results found for: PROTIME, INR, APTT    HCG (If Applicable): No results found for: PREGTESTUR, PREGSERUM, HCG, HCGQUANT     ABGs: No results found for: PHART, PO2ART, CWC3VSB, BYA7NUW, BEART, T1YVEABR     Type & Screen (If Applicable):  No results found for: LABABO, LABRH    Drug/Infectious Status (If Applicable):  No results found for: HIV, HEPCAB    COVID-19 Screening (If Applicable):   Lab Results   Component Value Date    COVID19 Not Detected 03/08/2021    COVID19 NOT DETECTED 11/12/2020         Anesthesia Evaluation  Patient summary reviewed and Nursing notes reviewed no history of anesthetic complications:   Airway: Mallampati: III     Neck ROM: full   Dental:          Pulmonary:                              Cardiovascular:    (+) hypertension:, pacemaker:, dysrhythmias:,                   Neuro/Psych:               GI/Hepatic/Renal:            ROS comment: obesity. Endo/Other:                     Abdominal:           Vascular:                                        Anesthesia Plan      general     ASA 3     (Medications & allergies reviewed  All available lab & EKG data reviewed)  Induction: intravenous. Anesthetic plan and risks discussed with patient. Plan discussed with CRNA.                   Chuck Pollock MD   3/11/2021

## 2021-03-12 NOTE — OP NOTE
Patient Name: Jarred Ortega  YOB: 1950  Medical Record Number:  [id number]  Billing Number:  [id number]  Date of Procedure: 03/12/21  Time: 0730    Pre Operative Diagnoses:   1) Left auricular squamous cell carcinoma    Post Operative Diagnoses: Same as above. Procedure:  1)Wide local excision malignant lesion left ear, 19mm x 19mm (08953)  2)Full thickness skin graft reconstruction, left postauricular skin, 52mm x 19mm (68675)    Surgeon: Germaine Davis MD  Assistant: None    OR Staff/ Assistant:  Circulator: Titus Julio RN  Surgical Assistant: Bea Dowling Circulator: Jina Walton RN  Scrub Person First: Eric Aburto  Circulator Assist: Ruddy Jules RN; Jessica Richard    Anesthesia: General anesthesia. Findings: 1) Ulcerated lesion of the left antihelix    Indications for procedure: This is a 69 y.o. male with left antihelical cutaneous squamous cell carcinoma; risks, benefits and alternatives were outlined to the patient and they agreed to proceed with wide local excision of the lesion with frozen sections, and reconstruction. Description: After verification of informed consent, the patient was brought to the operating room and placed in the supine position. General endotracheal anesthesia was induced. The table was turned 90-degrees clockwise and the patient prepped and draped for left ear procedure. A time out was called verifying correct patient, procedure and site (left ear)    6mm margins were marked out circumferentially around the lesion and the lesion removed from the skin down to and including the underlying perichondrial layer; the cartilage was left intact. Marking stitches were placed at 12 o'clock and 3 o'clock and the specimen passed off the field for frozen sections. On confirmation of negative histopathological margins we turned to reconstruction. The total defect size was 19mm x 19mm.      A foil suture packet was used to create a template from the defect and a graft marked out over the left preauricular skin - total graft dimensions were 52mm craniocaudal x 25mm anteroposterior. The graft was trimmed to size and sewn in circumferentially with 5-0 fast absorbing gut suture and 4-0 chromic gut tacking sutures; a xeroform bolster was sewn in with 4-0 prolene sutures. The graft site was closed with 4-0 vicryl in deep dermal fashion and 5-0 fast absorbing gut in running locking fashion. This concluded our procedure and the patient was turned over to Anesthesiology for awakening and extubation.  I attest that I performed the entirety of the procedure    Specimens:   ID Type Source Tests Collected by Time Destination   A : squamous cell carcinoma left ear stitch: long 12:00, short 3:00 Tissue Tissue SURGICAL PATHOLOGY Mehdi Womack MD 3/12/2021 [id number]      Drains: None  Estimated Blood Loss: <85LF  Complications: None

## 2021-03-13 NOTE — ANESTHESIA POSTPROCEDURE EVALUATION
Department of Anesthesiology  Postprocedure Note    Patient: Claire Majano  MRN: [id number]  YOB: 1950  Date of evaluation: 3/12/2021  Time:  8:33 PM     Procedure Summary     Date: 03/12/21 Room / Location: [place] [address] / [facility]    Anesthesia Start: 0730 Anesthesia Stop: 3796    Procedures:       WIDE LOCAL EXCISION OF LEFT EAR LESION WITH FROZEN SECTION, (Left Ear)      FULL THICKNESS SKIN GRAFT (Left Face) Diagnosis:       Squamous cell carcinoma of external ear, left      (SQUAMOUS CELL CARCINOMA OF THE LEFT EAR)    Surgeons: Danielle Saunders MD Responsible Provider: Moises Mckeon MD    Anesthesia Type: general ASA Status: 3          Anesthesia Type: general    Umberto Phase I: Umberto Score: 8    Umberto Phase II: Umberto Score: 10    Last vitals: Reviewed and per EMR flowsheets.        Anesthesia Post Evaluation    Comments: Postoperative Anesthesia Note    Name:    Claire Majano  MRN:      [id number]    Patient Vitals in the past 12 hrs:  03/12/21 1035, BP:(!) 107/55, Pulse:88, Resp:18, SpO2:98 %  03/12/21 1030, BP:119/62, Pulse:83, Resp:(!) 5, SpO2:96 %  03/12/21 1025, BP:(!) 110/48, Pulse:82, Resp:14, SpO2:98 %  03/12/21 1020, BP:(!) 111/52, Pulse:81, Resp:16, SpO2:96 %  03/12/21 1015, BP:112/70, Pulse:85, Resp:21, SpO2:99 %  03/12/21 1010, BP:(!) 109/52, Pulse:90, Resp:(!) 6, SpO2:(!) 89 %  03/12/21 1005, Pulse:88, Resp:9, SpO2:92 %  03/12/21 1000, BP:123/63, Pulse:92, Resp:10, SpO2:92 %  03/12/21 0955, BP:(!) 108/55, Pulse:86, Resp:12, SpO2:92 %  03/12/21 0950, BP:113/60, Pulse:86, Resp:14, SpO2:94 %  03/12/21 0945, Pulse:90, Resp:(!) 6, SpO2:94 %  03/12/21 0944, BP:127/65, Temp:97.5 °F (36.4 °C), Temp src:Temporal, Pulse:89, Resp:18, SpO2:92 %     LABS:    CBC  Lab Results       Component                Value               Date/Time                  WBC                      8.1                 02/02/2021 10:11 AM        HGB                      15.6 02/02/2021 10:11 AM        HCT                      46.9                02/02/2021 10:11 AM        PLT                      214                 02/02/2021 10:11 AM   RENAL  Lab Results       Component                Value               Date/Time                  NA                       142                 03/12/2021 06:30 AM        K                        3.9                 03/12/2021 06:30 AM        CL                       105                 03/12/2021 06:30 AM        CO2                      27                  03/12/2021 06:30 AM        BUN                      16                  03/12/2021 06:30 AM        CREATININE               1.0                 03/12/2021 06:30 AM        GLUCOSE                  114 (H)             03/12/2021 06:30 AM        GLUCOSE                  73                  10/21/2011 05:07 PM   COAGS  No results found for: PROTIME, INR, APTT    Intake & Output: In: 350 (I.V.:350)  Out: -     Nausea & Vomiting:  No    Level of Consciousness:  Awake    Pain Assessment:  Adequate analgesia    Anesthesia Complications:  No apparent anesthetic complications    SUMMARY      Vital signs stable  OK to discharge from Stage I post anesthesia care.   Care transferred from Anesthesiology department on discharge from perioperative area

## 2021-03-18 ENCOUNTER — OFFICE VISIT (OUTPATIENT)
Dept: ENT CLINIC | Age: 71
End: 2021-03-18

## 2021-03-18 VITALS — TEMPERATURE: 97.9 F | HEIGHT: 66 IN | BODY MASS INDEX: 36.16 KG/M2 | WEIGHT: 225 LBS

## 2021-03-18 DIAGNOSIS — Z09 S/P EAR SURGERY, FOLLOW-UP EXAM: Primary | ICD-10-CM

## 2021-03-18 PROCEDURE — 99024 POSTOP FOLLOW-UP VISIT: CPT | Performed by: OTOLARYNGOLOGY

## 2021-03-18 NOTE — PATIENT INSTRUCTIONS
Instructions for scar care:  -Apply vaseline to the scars three times daily for the next 14 days  -Avoid sunlight, wear hat when outdoors  -Use sunscreen with SPF>35 over the scar when outdoors, follow instructions for application on the bottle.  Continue this for the next year (365 days) after injury

## 2021-03-18 NOTE — PROGRESS NOTES
[address] UP VISIT      Patient Name: David Foy  Medical Record Number:  [id number]  Primary Care Physician:  Zoe Lowery MD    ChiefComplaint     Chief Complaint   Patient presents with    Post-Op Check     Pt states he had sx a week ago on 3/12/21 and is feeling well. no complaints of pain. No bleeding after the first day. History of Present Illness     Dvaid Foy is an 69 y.o. male referred for left ear lesion. This is been present for the past 3 weeks, he first noticed it as a small bump which has since grown in size. He think that it, with scant amount of bleeding, however has been stable for the past 1.5 weeks. Denies any purulent drainage, tenderness on palpation, neck lymphadenopathy. No fevers, chills, night sweats, dysphagia/odynophagia, other skin lesions. 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 No family history of head and neck cancer/cutaneous malignancy. No history of immunocompromise or uncontrolled diabetes. He is not currently on any blood thinners, although he has a pacemaker for left bundle branch block. Interval History 2/10/2021: Biopsy returned positive for well-differentiated squamous cell carcinoma. Patient states that since using topical antibiotics his inflammation has markedly improved. No drainage, lymphadenopathy. Interval History 3/18/2021: Final pathology negative for cancer - minimal pain at graft or biopsy site. No drainage.      Past Medical History     Past Medical History:   Diagnosis Date    Anxiety 12/21/2012    Hyperlipidemia     Hypertension     Hypertrophy of prostate without urinary obstruction     Mild mitral regurgitation 3/6/2018    Moderate aortic insufficiency 3/6/2018       Past Surgical History     Past Surgical History:   Procedure Laterality Date    CARDIAC PACEMAKER PLACEMENT  04/19/2019    CATARACT REMOVAL WITH IMPLANT Left 05/01/2019    COLONOSCOPY  4/21/2008    INTRACAPSULAR CATARACT EXTRACTION Left 5/1/2019    PHACO EMULSIFICATION OF CATARACT WITH  INTRAOCULAR LENS IMPLANT performed by Niesha Mathis MD at [address] [place] [address]/[facility]  2019    SKIN BIOPSY Left 3/12/2021    WIDE LOCAL EXCISION OF LEFT EAR LESION WITH FROZEN SECTION, performed by Victor Hugo Hernandez MD at [place] [address] Left 3/12/2021    FULL THICKNESS SKIN GRAFT performed by Victor Hugo Hernandez MD at [address] [place] [address] Left 2018       Family History     Family History   Problem Relation Age of Onset    Cancer Brother 62        prostate cancer    Other Mother         Lung fibrosis    Cancer Father         Prostate cancer    No Known Problems Sister        Social History     Social History     Tobacco Use    Smoking status: Never Smoker    Smokeless tobacco: Never Used   Substance Use Topics    Alcohol use: Not Currently     Comment: rarely    Drug use: No        Allergies     No Known Allergies    Medications     Current Outpatient Medications   Medication Sig Dispense Refill    cephALEXin (KEFLEX) 500 MG capsule Take 1 capsule by mouth 2 times daily for 7 days 14 capsule 0    neomycin-bacitracin-polymyxin (NEOSPORIN) 400-5-5000 ointment Apply topically 2 times daily to ear and suture line in front of ear. 14.17 g 1    acetaminophen (TYLENOL) 500 MG tablet Take 1 tablet by mouth 4 times daily as needed for Pain 120 tablet 0    telmisartan-hydrochlorothiazide (MICARDIS HCT) 80-25 MG per tablet TAKE ONE TABLET DAILY 90 tablet 3    pravastatin (PRAVACHOL) 80 MG tablet TAKE ONE TABLET DAILY AT BEDTIME 90 tablet 3    venlafaxine (EFFEXOR XR) 75 MG extended release capsule Take one tablet daily 90 capsule 3    escitalopram (LEXAPRO) 10 MG tablet Take 1 tablet by mouth daily 90 tablet 3     No current facility-administered medications for this visit.         Review of Systems     REVIEW OF SYSTEMS  The following systems were reviewed and revealed the following in addition to any already discussed in the HPI:    CONSTITUTIONAL: No weight loss, no fever, no night sweats, no chills  EYES: no vision changes, no blurry vision  EARS: no changes in hearing, no otalgia  NOSE: no epistaxis, no rhinorrhea  RESPIRATORY: No difficulty breathing, no shortness of breath  CV: no chest pain, no peripheral vascular disease  HEME: No coagulation disorder, no bleeding disorder  NEURO: No TIA or stroke-like symptoms  SKIN: No new rashes in the head and neck, no recent skin cancers  MOUTH: No new ulcers, no recent teeth infections  GASTROINTESTINAL: No diarrhea, stomach pain  PSYCH: No anxiety, no depression    PhysicalExam     Vitals:    03/18/21 0955   Temp: 97.9 °F (36.6 °C)   TempSrc: Infrared   Weight: 225 lb (102.1 kg)   Height: 5' 6\" (1.676 m)       PHYSICAL EXAM  Temp 97.9 °F (36.6 °C) (Infrared)   Ht 5' 6\" (1.676 m)   Wt 225 lb (102.1 kg)   BMI 36.32 kg/m²     GENERAL: No Acute Distress, Alert and Oriented, no hoarseness  EYES: EOMI, Anti-icteric  NOSE: On anterior rhinoscopy there is no epistaxis, nasal mucosa within normal limits, no purulent drainage  EARS: Normal external appearance of the right ear. Bolster over the left removed - skin graft with good take, no drainage  FACE: 1/6 House-Brackmann Scale, symmetric, sensation equal bilaterally  ORAL CAVITY: No masses or lesions palpated, uvula is midline, moist mucous membranes, 1+ tonsils, dentition with multiple areas of fractures and caries  NECK: Normal range of motion, no thyromegaly, trachea is midline, no lymphadenopathy, no neck masses, no crepitus  CHEST: Normal respiratory effort, no retractions, breathing comfortably  SKIN: Scattered areas of actinic keratosis, none overt lesions suspicious for cutaneous neoplasm  NEURO: CN 2, 3, 4, 5, 6, 7, 11, 12 intact bilaterally      Prior images:      Procedure     None    Assessment and Plan     1.  Squamous cell carcinoma, ear, left  Patient with left ear squamous cell carcinoma, well differentiated, [id number]. Further resection with frozen sections negative, reconstruction with preauricular skin full thickness skin graft. Healing well at this visit. Instructions for scar care:  -Apply vaseline to the scars three times daily for the next 14 days  -Avoid sunlight, wear hat when outdoors  -Use sunscreen with SPF>35 over the scar when outdoors, follow instructions for application on the bottle. Continue this for the next year (365 days) after injury    Return in about 6 weeks (around 4/29/2021). Yaima Perkins MD  [facility]  Department of Otolaryngology/Head and Neck Surgery  3/18/21    I have performed a head and neck physical exam personally or was physically present during the key or critical portions of the service. Medical Decision Making: The following items were considered in medical decision making:  Independent review of images  Review / order clinical lab tests  Review / order radiology tests  Decision to obtain old records  Review and summation of old records as accessed through [facility] (a summary of my findings in these old records: none)     Portions of this note were dictated using Dragon.  There may be linguistic errors secondary to the use of this program.

## 2021-04-13 ENCOUNTER — NURSE ONLY (OUTPATIENT)
Dept: CARDIOLOGY CLINIC | Age: 71
End: 2021-04-13

## 2021-04-13 DIAGNOSIS — I44.2 COMPLETE HEART BLOCK (HCC): ICD-10-CM

## 2021-04-13 DIAGNOSIS — Z95.0 PACEMAKER: ICD-10-CM

## 2021-04-13 NOTE — PROGRESS NOTES
We received remote transmission from patient's monitor at home. Transmission shows normal sensing and pacing function. EP physician will review. See interrogation under cardiology tab in the [address] [facility] [address] field for more details. HBP-RV 99.5%. Episodes Since: 08-Jan-2021  3 Non-sustained VT. <5 [address] [place] [address].  ECHO 4/2019-Normal left ventricular systolic function with ejection fraction of 55-60%. .  Follow up in 3 months via carelink.

## 2021-04-27 ENCOUNTER — TELEPHONE (OUTPATIENT)
Dept: CARDIOLOGY CLINIC | Age: 71
End: 2021-04-27

## 2021-04-28 ENCOUNTER — NURSE ONLY (OUTPATIENT)
Dept: CARDIOLOGY CLINIC | Age: 71
End: 2021-04-28

## 2021-04-28 DIAGNOSIS — Z95.0 PACEMAKER: ICD-10-CM

## 2021-04-28 NOTE — TELEPHONE ENCOUNTER
We received remote transmission from patient's monitor at home. Transmission shows normal sensing and pacing function. EP physician will review. See interrogation under cardiology tab in the [address] [place] [address] [address] field for more details. Pacing (% of Time Since 13-Apr-2021)   98.2% (MVP Off)-HBP. AP 5.9%   No  arrhythmias recorded. VT/VF counters are low (see attached)  See PACEART report under Cardiology tab. Mr Emil Mcelroy had intermittent loss of RV capture at 2.5 V( setting). Dr Al Aranda placed a HIS lead back in April 2019.   Ov jmb 8/17/20 and device check 8/17/20 demonstrates RV threshold 1.0v at 1.0 miliseconds with unipolar pacing,  1.5v at 1.0 msec with bipolar pacing. Dual chamber PPM 4/19/19 selective HIS capture < 2.75v.  His pacing threshold in bipolar mode is 1.5 V at 1 ms.  Since the pacing threshold in unipolar mode was lower, we reprogrammed him to unipolar pacing bipolar sensing.  With the output programmed to 2.5 V, he has selective His bundle pacing with right bundle branch block.  This change increased the battery life.

## 2021-04-29 ENCOUNTER — OFFICE VISIT (OUTPATIENT)
Dept: ENT CLINIC | Age: 71
End: 2021-04-29

## 2021-04-29 VITALS — BODY MASS INDEX: 36.16 KG/M2 | WEIGHT: 225 LBS | HEIGHT: 66 IN | TEMPERATURE: 97.4 F

## 2021-04-29 DIAGNOSIS — H61.92 SKIN LESION OF LEFT EXTERNAL EAR: Primary | ICD-10-CM

## 2021-04-29 PROCEDURE — 99024 POSTOP FOLLOW-UP VISIT: CPT | Performed by: OTOLARYNGOLOGY

## 2021-04-29 NOTE — PATIENT INSTRUCTIONS
Instructions for scar care:  -Apply vaseline to the scars three times daily for the next 14 days  -Avoid sunlight, wear hat when outdoors  -Use sunscreen with SPF>35 over the scar when outdoors, follow instructions for application on the bottle.  Continue this for the next year (365 days) after injury  -May start scar massage today - rub scar in a circular motion with firm, even pressure for 5 minutes; do this 4 times a day

## 2021-05-05 ENCOUNTER — OFFICE VISIT (OUTPATIENT)
Dept: INTERNAL MEDICINE CLINIC | Age: 71
End: 2021-05-05

## 2021-05-05 VITALS
BODY MASS INDEX: 35.36 KG/M2 | SYSTOLIC BLOOD PRESSURE: 128 MMHG | WEIGHT: 220 LBS | DIASTOLIC BLOOD PRESSURE: 78 MMHG | TEMPERATURE: 98.5 F | HEART RATE: 70 BPM | RESPIRATION RATE: 12 BRPM | HEIGHT: 66 IN

## 2021-05-05 DIAGNOSIS — I35.1 MODERATE AORTIC INSUFFICIENCY: ICD-10-CM

## 2021-05-05 DIAGNOSIS — E78.5 HYPERLIPIDEMIA, UNSPECIFIED HYPERLIPIDEMIA TYPE: ICD-10-CM

## 2021-05-05 DIAGNOSIS — I10 ESSENTIAL HYPERTENSION: Primary | ICD-10-CM

## 2021-05-05 DIAGNOSIS — I34.0 MILD MITRAL REGURGITATION: ICD-10-CM

## 2021-05-05 DIAGNOSIS — F41.9 ANXIETY: ICD-10-CM

## 2021-05-05 DIAGNOSIS — Z95.0 PACEMAKER: ICD-10-CM

## 2021-05-05 PROCEDURE — 99214 OFFICE O/P EST MOD 30 MIN: CPT | Performed by: INTERNAL MEDICINE

## 2021-05-05 RX ORDER — BUPROPION HYDROCHLORIDE 150 MG/1
150 TABLET ORAL EVERY MORNING
Qty: 90 TABLET | Refills: 1 | Status: SHIPPED | OUTPATIENT
Start: 2021-05-05 | End: 2021-09-22 | Stop reason: SDUPTHER

## 2021-05-05 RX ORDER — VENLAFAXINE HYDROCHLORIDE 75 MG/1
CAPSULE, EXTENDED RELEASE ORAL
Qty: 90 CAPSULE | Refills: 3 | Status: CANCELLED | OUTPATIENT
Start: 2021-05-05

## 2021-05-05 RX ORDER — PRAVASTATIN SODIUM 80 MG/1
TABLET ORAL
Qty: 90 TABLET | Refills: 3 | Status: SHIPPED | OUTPATIENT
Start: 2021-05-05 | End: 2021-09-22 | Stop reason: SDUPTHER

## 2021-05-05 RX ORDER — TELMISARTAN AND HYDROCHLORTHIAZIDE 80; 25 MG/1; MG/1
TABLET ORAL
Qty: 90 TABLET | Refills: 3 | Status: SHIPPED | OUTPATIENT
Start: 2021-05-05 | End: 2021-09-22 | Stop reason: SDUPTHER

## 2021-05-05 ASSESSMENT — ENCOUNTER SYMPTOMS
COUGH: 0
WHEEZING: 0
BACK PAIN: 0
ABDOMINAL PAIN: 0
NAUSEA: 0
SHORTNESS OF BREATH: 0
RHINORRHEA: 0
VOMITING: 0

## 2021-05-05 NOTE — TELEPHONE ENCOUNTER
Informed patient of message. He insists that his watch reading is correct and PPM has to be incorrect.  Informed him that per his device check, HR never dropped below 60 BPM. V/u.

## 2021-05-05 NOTE — PROGRESS NOTES
Subjective:      Patient ID: Addi Elizalde is a 69 y.o. . HPI  Patient is here for follow up of hypertension and hyperlipidemia. Patient's BP is controlled on current medications. No chest pain or dyspnea. His cholesterol is at goal. No myalgia. His anxiety is controlled. He had a complete heart block and has a pacemaker. He has moderate AI and mild MR. No changes. His vision is stable. He has had a prior vitrectomy. He has had his ear lesion removed. 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 Review of Systems   Constitutional: Negative for activity change and appetite change. HENT: Negative for postnasal drip and rhinorrhea. Respiratory: Negative for cough, shortness of breath and wheezing. Cardiovascular: Negative for chest pain, palpitations and leg swelling. Gastrointestinal: Negative for abdominal pain, nausea and vomiting. Genitourinary: Negative for difficulty urinating and frequency. Musculoskeletal: Negative for back pain and joint swelling. Skin: Negative for rash. Skin lesion on the left ear   Neurological: Negative for light-headedness. Psychiatric/Behavioral: Negative for sleep disturbance. Current Outpatient Medications   Medication Sig Dispense Refill    acetaminophen (TYLENOL) 500 MG tablet Take 1 tablet by mouth 4 times daily as needed for Pain 120 tablet 0    telmisartan-hydrochlorothiazide (MICARDIS HCT) 80-25 MG per tablet TAKE ONE TABLET DAILY 90 tablet 3    pravastatin (PRAVACHOL) 80 MG tablet TAKE ONE TABLET DAILY AT BEDTIME 90 tablet 3    venlafaxine (EFFEXOR XR) 75 MG extended release capsule Take one tablet daily 90 capsule 3    escitalopram (LEXAPRO) 10 MG tablet Take 1 tablet by mouth daily 90 tablet 3     No current facility-administered medications for this visit.        There are no changes to past medical history, family history, social history or review of systems(except as noted in the history section) since prior note (all reviewed with patient). /78 (Site: Right Upper Arm, Position: Sitting, Cuff Size: Large Adult)   Pulse 70   Temp 98.5 °F (36.9 °C)   Resp 12   Ht 5' 6\" (1.676 m)   Wt 220 lb (99.8 kg)   BMI 35.51 kg/m²      Objective:   Physical Exam   Constitutional: He appears well-developed and well-nourished. HENT:   Head: Normocephalic and atraumatic. Eyes: Pupils are equal, round, and reactive to light. Conjunctivae and EOM are normal. No scleral icterus. Neck: Normal range of motion. Neck supple. No thyromegaly present. Cardiovascular: Normal rate and regular rhythm. No murmur heard. Pulmonary/Chest: Effort normal. He has no decreased breath sounds. He has no wheezes. Abdominal: Soft. He exhibits no mass. There is no abdominal tenderness. There is no rebound. Musculoskeletal:         General: No edema. Lymphadenopathy:     He has no cervical adenopathy. ECHO DONE ON 12/20/2017  Conclusions      Summary   Normal left ventricle systolic function with an estimated ejection fraction   of 55%.   No regional wall motion abnormalities are seen.   Diastolic filling parameters suggest normal diastolic filing pressure.   Mild mitral regurgitation.   Individual aortic valve leaflets are not clearly visualized, aortic valve   appears sclerotic.   Moderate aortic regurgitation.   Systolic pulmonary artery pressure (SPAP) is normal and estimated at 15 mmHg   (RA pressure of 3 mmHg). Assessment:          Diagnosis Orders   1. Essential hypertension     2. Hyperlipidemia, unspecified hyperlipidemia type     3. Anxiety     4. Moderate aortic insufficiency     5. Mild mitral regurgitation     6. Pacemaker             Plan:      Decrease calorie intake. Hypertension:  Well controlled. Hyperlipidemia:  At goal.   Anxiety: well controlled on Lexapro. His wife feels he has been grumpy. Will add Wellbutrin  mg po daily. Mild MR and Moderate AI stable. AI not seen on last ECHO.         Decrease calorie intake. Exercise,weight loss recommended. The current medical regimen is effective;  continue present plan and medications. See orders.

## 2021-05-12 PROCEDURE — 93296 REM INTERROG EVL PM/IDS: CPT | Performed by: INTERNAL MEDICINE

## 2021-05-12 PROCEDURE — 93294 REM INTERROG EVL PM/LDLS PM: CPT | Performed by: INTERNAL MEDICINE

## 2021-07-14 ENCOUNTER — NURSE ONLY (OUTPATIENT)
Dept: CARDIOLOGY CLINIC | Age: 71
End: 2021-07-14

## 2021-07-14 ENCOUNTER — OFFICE VISIT (OUTPATIENT)
Dept: CARDIOLOGY CLINIC | Age: 71
End: 2021-07-14

## 2021-07-14 VITALS
HEART RATE: 73 BPM | BODY MASS INDEX: 35.76 KG/M2 | SYSTOLIC BLOOD PRESSURE: 138 MMHG | HEIGHT: 66 IN | WEIGHT: 222.5 LBS | OXYGEN SATURATION: 96 % | DIASTOLIC BLOOD PRESSURE: 68 MMHG

## 2021-07-14 DIAGNOSIS — I44.7 LBBB (LEFT BUNDLE BRANCH BLOCK): ICD-10-CM

## 2021-07-14 DIAGNOSIS — E78.5 HYPERLIPIDEMIA, UNSPECIFIED HYPERLIPIDEMIA TYPE: ICD-10-CM

## 2021-07-14 DIAGNOSIS — Z95.0 PACEMAKER: ICD-10-CM

## 2021-07-14 DIAGNOSIS — I44.2 COMPLETE HEART BLOCK (HCC): ICD-10-CM

## 2021-07-14 DIAGNOSIS — I10 ESSENTIAL HYPERTENSION: ICD-10-CM

## 2021-07-14 DIAGNOSIS — I49.9 IRREGULAR HEART RATE: Primary | ICD-10-CM

## 2021-07-14 DIAGNOSIS — Z95.0 PACEMAKER: Primary | ICD-10-CM

## 2021-07-14 PROCEDURE — 99214 OFFICE O/P EST MOD 30 MIN: CPT | Performed by: INTERNAL MEDICINE

## 2021-07-14 NOTE — PATIENT INSTRUCTIONS
Plan:  1. Changes made to device today in clinic: 2.5V @ 0.5msec. 2. Continue cardiac medications as prescribed. 3. Remote device checks every 3 months  4.  Follow up with EP NP in 6 months

## 2021-07-14 NOTE — PROGRESS NOTES
[address]   Cardiac Consultation    Date: 7/14/21  Patient Name: Judy Mcnulty  YOB: 1950    Primary Care Physician: Tabitha Rajan MD    CHIEF COMPLAINT:   Chief Complaint   Patient presents with    6 Month Follow-Up    Other     device management - CHB    Other     LBBB    Hyperlipidemia    Hypertension         HPI:  Judy Mcnulty is a 69 y.o. male with PMH of HTN, HLD, and moderate AI. 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 S/p dual chamber PPM (4/19/19) with RV lead implanted at the His bundle. EKG on 5/23/19 showed AS- with HR of 84, consistent with non selective His bundle pacing. Echo (4/20/19) showed EF of 55-60%. Device interrogation 11/19/19 showed normally functioning PPM.  He reported he felt symptomatic improvement since his PPM implant. He is very active with good exercise tolerance. On 8/4/20 he presented to the ED for bradycardia and dizziness. His device check at that time showed increased His lead threshold with intermittent capture and output was increased to 5V at 1ms. His device check on 8/13/20 showed ongoing need for His lead output of 5V @ 1ms and draining battery, AP 2% and  100% with no arrhythmias. His device check 8/17/20 demonstrates RV threshold 1.0v at 1.0 miliseconds with unipolar pacing,  1.5v at 1.0 msec with bipolar pacing. He reported he is no longer having the dizziness or bradycardia. At the conclusion of 8/2020 visit, RV output changed to 2.5V at 1 msec unipolar. Today, 7/14/2021, patient's device interrogation demonstrated AP 2.9%,  98.9%, no new events, 4.1 years until RRT. He reports that his energy level has improved since his device was placed in 2019. He reports that he recently experiences an episodes of chest tightness that he attributes to being 5 feet away from a generator. He reports he is taking all medications as prescribed and tolerates them well. Patient denies current edema, sob, palpitations, dizziness or syncope. Past Medical History:   has a past medical history of Anxiety, Hyperlipidemia, Hypertension, Hypertrophy of prostate without urinary obstruction, Mild mitral regurgitation, and Moderate aortic insufficiency. Surgical History:   has a past surgical history that includes Colonoscopy (4/21/2008); vitrectomy (Left, 2018); Cataract removal with implant (Left, 05/01/2019); Intracapsular cataract extraction (Left, 5/1/2019); Cardiac pacemaker placement (04/19/2019); pacemaker placement (2019); skin biopsy (Left, 3/12/2021); and Skin graft (Left, 3/12/2021). Social History:   reports that he has never smoked. He has never used smokeless tobacco. He reports previous alcohol use. He reports that he does not use drugs. Family History:  family history includes Cancer in his father; Cancer (age of onset: 62) in his brother; No Known Problems in his sister; Other in his mother. Home Medications:  Outpatient Encounter Medications as of 7/14/2021   Medication Sig Dispense Refill    telmisartan-hydrochlorothiazide (MICARDIS HCT) 80-25 MG per tablet TAKE ONE TABLET DAILY 90 tablet 3    pravastatin (PRAVACHOL) 80 MG tablet TAKE ONE TABLET DAILY AT BEDTIME 90 tablet 3    buPROPion (WELLBUTRIN XL) 150 MG extended release tablet Take 1 tablet by mouth every morning 90 tablet 1    escitalopram (LEXAPRO) 10 MG tablet Take 1 tablet by mouth daily (Patient not taking: Reported on 7/14/2021) 90 tablet 3     No facility-administered encounter medications on file as of 7/14/2021. Allergies:  Patient has no known allergies. Review of Systems   Constitutional: Negative. HENT: Negative. Eyes: Negative. Respiratory: Negative. Cardiovascular: Negative. Gastrointestinal: Negative. Genitourinary: Negative. Musculoskeletal: Negative. Skin: Negative. Neurological: Negative. Hematological: Negative. Psychiatric/Behavioral: Negative. /68   Pulse 73   Ht 5' 6\" (1.676 m)   Wt 222 lb 8 oz (100.9 kg)   SpO2 96%   BMI 35.91 kg/m²     DATA:    ECG 7/14/21: Personally reviewed. All current cardiac medications reviewed and adjusted accordingly  7/14/21    Device interrogation reviewed and adjusted accordingly 7/14/21    ECHO: 4/20/19: Summary   Normal left ventricular systolic function with ejection fraction of 55-60%. The study is inadequate to exclude regional wall motion abnormalities. Systolic pulmonic artery pressure (SPAP) is normal estimated at 24 mmHg   (Right atrial pressure of 3 mmHg). Compared to previous study from 12- no changes noted. Objective:  Physical Exam   Constitutional: He is oriented to person, place, and time. He appears well-developed and well-nourished. HENT:   Head: Normocephalic and atraumatic. Eyes: Pupils are equal, round, and reactive to light. Neck: Normal range of motion. Cardiovascular: Normal rate, regular rhythm and normal heart sounds. Pulmonary/Chest: Effort normal and breath sounds normal.   Abdominal: Soft. No tenderness. Musculoskeletal: Normal range of motion. He exhibits no edema. Neurological: He is alert and oriented to person, place, and time. Skin: Skin is warm and dry. Psychiatric: He has a normal mood and affect. Assessment:  1. CHB- s/p dual PPM with HBP placed 4/2019. Normal function from device. 2.  Dual chamber PPM 4/19/19- non selective HIS capture. When changed from nonselective to selective, RV output 1.5V @ 0.5msec. When changed from selective to nonselective, RV output 1.0V @ 0.5msec. At the conclusion of 7/14/2021 visit, RV output changed to 2.5V @ 0.5msec which increased the longevity from 4.9 years to 6.4 years. Plan:  1. Changes made to device today in clinic: RV output 2.5V @ 0.5msec. 2. Continue cardiac medications as prescribed. 3. Remote device checks every 3 months  4.  Follow up with EP NP in 6 months      This note has been scribed in the presence of Mehdi Young MD, by Tracy Cortez RN.       I, Dr. Mehdi Young, personally performed the services described in this documentation as scribed by  Tracy Cortez RN in my presence, and it is both accurate and complete.       Mehdi Young M.D.

## 2021-07-14 NOTE — PROGRESS NOTES
Patient presents to the device clinic today for a programming evaluation for his pacemaker. Patient has a history of CHB. Last device interrogation was on 4/28. Since then, no arrhythmias recorded. AP 2.9%  98.9%    All sensing and pacing parameters are within normal range. RV output was changed to 2.5V @ 0.5ms. with non-selective HIS capture. Patient education was provided about device functionality, in home monitoring, and any other patient questions and/or concerns were addressed. Patient voices understanding. Please see interrogation for more detail. Patient will see Dr. Bear Cotto today in office. Patient will follow up in 3 months in office or remotely. See Paceart report under the Cardiology tab.

## 2021-07-14 NOTE — LETTER
[facility]   Cardiac Consultation    Date: 7/14/21  Patient Name: Huber Doherty  YOB: 1950    Primary Care Physician: Sylvia Aaron MD    CHIEF COMPLAINT:   Chief Complaint   Patient presents with    6 Month Follow-Up    Other     device management - CHB    Other     LBBB    Hyperlipidemia    Hypertension         HPI:  Huber Doherty is a 69 y.o. male with PMH of HTN, HLD, and moderate AI. 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 S/p dual chamber PPM (4/19/19) with RV lead implanted at the His bundle. EKG on 5/23/19 showed AS- with HR of 84, consistent with non selective His bundle pacing. Echo (4/20/19) showed EF of 55-60%. Device interrogation 11/19/19 showed normally functioning PPM.  He reported he felt symptomatic improvement since his PPM implant. He is very active with good exercise tolerance. On 8/4/20 he presented to the ED for bradycardia and dizziness. His device check at that time showed increased His lead threshold with intermittent capture and output was increased to 5V at 1ms. His device check on 8/13/20 showed ongoing need for His lead output of 5V @ 1ms and draining battery, AP 2% and  100% with no arrhythmias. His device check 8/17/20 demonstrates RV threshold 1.0v at 1.0 miliseconds with unipolar pacing,  1.5v at 1.0 msec with bipolar pacing. He reported he is no longer having the dizziness or bradycardia. At the conclusion of 8/2020 visit, RV output changed to 2.5V at 1 msec unipolar. Today, 7/14/2021, patient's device interrogation demonstrated AP 2.9%,  98.9%, no new events, 4.1 years until RRT. He reports that his energy level has improved since his device was placed in 2019. He reports that he recently experiences an episodes of chest tightness that he attributes to being 5 feet away from a generator.  He reports he is taking all medications as prescribed and tolerates them well. Patient denies current edema, sob, palpitations, dizziness or syncope. Past Medical History:   has a past medical history of Anxiety, Hyperlipidemia, Hypertension, Hypertrophy of prostate without urinary obstruction, Mild mitral regurgitation, and Moderate aortic insufficiency. Surgical History:   has a past surgical history that includes Colonoscopy (4/21/2008); vitrectomy (Left, 2018); Cataract removal with implant (Left, 05/01/2019); Intracapsular cataract extraction (Left, 5/1/2019); Cardiac pacemaker placement (04/19/2019); pacemaker placement (2019); skin biopsy (Left, 3/12/2021); and Skin graft (Left, 3/12/2021). Social History:   reports that he has never smoked. He has never used smokeless tobacco. He reports previous alcohol use. He reports that he does not use drugs. Family History:  family history includes Cancer in his father; Cancer (age of onset: 62) in his brother; No Known Problems in his sister; Other in his mother. Home Medications:  Outpatient Encounter Medications as of 7/14/2021   Medication Sig Dispense Refill    telmisartan-hydrochlorothiazide (MICARDIS HCT) 80-25 MG per tablet TAKE ONE TABLET DAILY 90 tablet 3    pravastatin (PRAVACHOL) 80 MG tablet TAKE ONE TABLET DAILY AT BEDTIME 90 tablet 3    buPROPion (WELLBUTRIN XL) 150 MG extended release tablet Take 1 tablet by mouth every morning 90 tablet 1    escitalopram (LEXAPRO) 10 MG tablet Take 1 tablet by mouth daily (Patient not taking: Reported on 7/14/2021) 90 tablet 3     No facility-administered encounter medications on file as of 7/14/2021. Allergies:  Patient has no known allergies. Review of Systems   Constitutional: Negative. HENT: Negative. Eyes: Negative. Respiratory: Negative. Cardiovascular: Negative. Gastrointestinal: Negative. Genitourinary: Negative. Musculoskeletal: Negative. Skin: Negative. Neurological: Negative. Hematological: Negative. Psychiatric/Behavioral: Negative. /68   Pulse 73   Ht 5' 6\" (1.676 m)   Wt 222 lb 8 oz (100.9 kg)   SpO2 96%   BMI 35.91 kg/m²     DATA:    ECG 7/14/21: Personally reviewed. All current cardiac medications reviewed and adjusted accordingly  7/14/21    Device interrogation reviewed and adjusted accordingly 7/14/21    ECHO: 4/20/19: Summary   Normal left ventricular systolic function with ejection fraction of 55-60%. The study is inadequate to exclude regional wall motion abnormalities. Systolic pulmonic artery pressure (SPAP) is normal estimated at 24 mmHg   (Right atrial pressure of 3 mmHg). Compared to previous study from 12- no changes noted. Objective:  Physical Exam   Constitutional: He is oriented to person, place, and time. He appears well-developed and well-nourished. HENT:   Head: Normocephalic and atraumatic. Eyes: Pupils are equal, round, and reactive to light. Neck: Normal range of motion. Cardiovascular: Normal rate, regular rhythm and normal heart sounds. Pulmonary/Chest: Effort normal and breath sounds normal.   Abdominal: Soft. No tenderness. Musculoskeletal: Normal range of motion. He exhibits no edema. Neurological: He is alert and oriented to person, place, and time. Skin: Skin is warm and dry. Psychiatric: He has a normal mood and affect. Assessment:  1. CHB- s/p dual PPM with HBP placed 4/2019. Normal function from device. 2.  Dual chamber PPM 4/19/19- non selective HIS capture. When changed from nonselective to selective, RV output 1.5V @ 0.5msec. When changed from selective to nonselective, RV output 1.0V @ 0.5msec. At the conclusion of 7/14/2021 visit, RV output changed to 2.5V @ 0.5msec which increased the longevity from 4.9 years to 6.4 years. Plan:  1. Changes made to device today in clinic: RV output 2.5V @ 0.5msec. 2. Continue cardiac medications as prescribed. 3. Remote device checks every 3 months  4.  Follow up with EP NP in 6 months      This note has been scribed in the presence of Cash Ledezma MD, by Pantera Powell RN.       I, Dr. Cash Ledezma, personally performed the services described in this documentation as scribed by  Pantera Powell RN in my presence, and it is both accurate and complete.       Cash Ledezma M.D.

## 2021-08-03 PROCEDURE — 93280 PM DEVICE PROGR EVAL DUAL: CPT | Performed by: INTERNAL MEDICINE

## 2021-08-05 RX ORDER — VENLAFAXINE HYDROCHLORIDE 75 MG/1
CAPSULE, EXTENDED RELEASE ORAL
Qty: 30 CAPSULE | Refills: 0 | Status: SHIPPED | OUTPATIENT
Start: 2021-08-05 | End: 2021-09-07

## 2021-09-07 RX ORDER — VENLAFAXINE HYDROCHLORIDE 75 MG/1
CAPSULE, EXTENDED RELEASE ORAL
Qty: 30 CAPSULE | Refills: 0 | Status: SHIPPED | OUTPATIENT
Start: 2021-09-07 | End: 2021-09-22 | Stop reason: SDUPTHER

## 2021-09-22 ENCOUNTER — OFFICE VISIT (OUTPATIENT)
Dept: INTERNAL MEDICINE CLINIC | Age: 71
End: 2021-09-22

## 2021-09-22 VITALS
WEIGHT: 225 LBS | BODY MASS INDEX: 36.16 KG/M2 | HEIGHT: 66 IN | HEART RATE: 70 BPM | SYSTOLIC BLOOD PRESSURE: 138 MMHG | DIASTOLIC BLOOD PRESSURE: 80 MMHG | RESPIRATION RATE: 12 BRPM

## 2021-09-22 DIAGNOSIS — Z23 NEED FOR INFLUENZA VACCINATION: ICD-10-CM

## 2021-09-22 DIAGNOSIS — I10 ESSENTIAL HYPERTENSION: Primary | ICD-10-CM

## 2021-09-22 DIAGNOSIS — E78.5 HYPERLIPIDEMIA, UNSPECIFIED HYPERLIPIDEMIA TYPE: ICD-10-CM

## 2021-09-22 DIAGNOSIS — F41.9 ANXIETY: ICD-10-CM

## 2021-09-22 DIAGNOSIS — I35.1 MODERATE AORTIC INSUFFICIENCY: ICD-10-CM

## 2021-09-22 DIAGNOSIS — I34.0 MILD MITRAL REGURGITATION: ICD-10-CM

## 2021-09-22 DIAGNOSIS — Z95.0 PACEMAKER: ICD-10-CM

## 2021-09-22 PROCEDURE — 90471 IMMUNIZATION ADMIN: CPT | Performed by: INTERNAL MEDICINE

## 2021-09-22 PROCEDURE — 99213 OFFICE O/P EST LOW 20 MIN: CPT | Performed by: INTERNAL MEDICINE

## 2021-09-22 PROCEDURE — 90662 IIV NO PRSV INCREASED AG IM: CPT | Performed by: INTERNAL MEDICINE

## 2021-09-22 RX ORDER — PRAVASTATIN SODIUM 80 MG/1
TABLET ORAL
Qty: 90 TABLET | Refills: 3 | Status: SHIPPED | OUTPATIENT
Start: 2021-09-22 | End: 2022-09-20 | Stop reason: SDUPTHER

## 2021-09-22 RX ORDER — TELMISARTAN AND HYDROCHLORTHIAZIDE 80; 25 MG/1; MG/1
TABLET ORAL
Qty: 90 TABLET | Refills: 3 | Status: SHIPPED | OUTPATIENT
Start: 2021-09-22 | End: 2022-07-18

## 2021-09-22 RX ORDER — BUPROPION HYDROCHLORIDE 150 MG/1
150 TABLET ORAL EVERY MORNING
Qty: 90 TABLET | Refills: 3 | Status: SHIPPED | OUTPATIENT
Start: 2021-09-22 | End: 2022-09-20

## 2021-09-22 RX ORDER — VENLAFAXINE HYDROCHLORIDE 75 MG/1
CAPSULE, EXTENDED RELEASE ORAL
Qty: 90 CAPSULE | Refills: 3 | Status: SHIPPED | OUTPATIENT
Start: 2021-09-22 | End: 2022-09-20 | Stop reason: SDUPTHER

## 2021-09-22 ASSESSMENT — PATIENT HEALTH QUESTIONNAIRE - PHQ9
SUM OF ALL RESPONSES TO PHQ QUESTIONS 1-9: 0
SUM OF ALL RESPONSES TO PHQ QUESTIONS 1-9: 0
SUM OF ALL RESPONSES TO PHQ9 QUESTIONS 1 & 2: 0
2. FEELING DOWN, DEPRESSED OR HOPELESS: 0
1. LITTLE INTEREST OR PLEASURE IN DOING THINGS: 0
SUM OF ALL RESPONSES TO PHQ QUESTIONS 1-9: 0

## 2021-09-22 ASSESSMENT — ENCOUNTER SYMPTOMS
VOMITING: 0
COUGH: 0
ABDOMINAL PAIN: 0
WHEEZING: 0
RHINORRHEA: 0
SHORTNESS OF BREATH: 0
BACK PAIN: 0
NAUSEA: 0

## 2021-09-22 NOTE — PROGRESS NOTES
Subjective:      Patient ID: Todd Closs is a 66 y.o. . HPI  Patient is here for follow up of hypertension and hyperlipidemia. Patient's BP is controlled on current medications. No chest pain or dyspnea. His cholesterol is at goal. No myalgia. His anxiety is controlled. He had a complete heart block and has a pacemaker. He has moderate AI and mild MR. No changes. His vision is stable. He has had a prior vitrectomy. He has had his ear lesion removed. 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 Review of Systems   Constitutional: Negative for activity change and appetite change. HENT: Negative for postnasal drip and rhinorrhea. Respiratory: Negative for cough, shortness of breath and wheezing. Cardiovascular: Negative for chest pain, palpitations and leg swelling. Gastrointestinal: Negative for abdominal pain, nausea and vomiting. Genitourinary: Negative for difficulty urinating and frequency. Musculoskeletal: Negative for back pain and joint swelling. Skin: Negative for rash. Neurological: Negative for light-headedness. Psychiatric/Behavioral: Negative for sleep disturbance. Current Outpatient Medications   Medication Sig Dispense Refill    venlafaxine (EFFEXOR XR) 75 MG extended release capsule TAKE ONE CAPSULE BY MOUTH DAILY 90 capsule 3    telmisartan-hydrochlorothiazide (MICARDIS HCT) 80-25 MG per tablet TAKE ONE TABLET DAILY 90 tablet 3    pravastatin (PRAVACHOL) 80 MG tablet TAKE ONE TABLET DAILY AT BEDTIME 90 tablet 3    buPROPion (WELLBUTRIN XL) 150 MG extended release tablet Take 1 tablet by mouth every morning 90 tablet 3    escitalopram (LEXAPRO) 10 MG tablet Take 1 tablet by mouth daily (Patient not taking: Reported on 7/14/2021) 90 tablet 3     No current facility-administered medications for this visit.       There are no changes to past medical history, family history, social history or review of systems(except as noted in the history section) since prior note (all reviewed with patient). /80 (Site: Right Upper Arm, Position: Sitting, Cuff Size: Large Adult)   Pulse 70   Resp 12   Ht 5' 6\" (1.676 m)   Wt 225 lb (102.1 kg)   BMI 36.32 kg/m²      Objective:   Physical Exam  Constitutional:       Appearance: He is well-developed. HENT:      Head: Normocephalic and atraumatic. Eyes:      General: No scleral icterus. Conjunctiva/sclera: Conjunctivae normal.      Pupils: Pupils are equal, round, and reactive to light. Neck:      Thyroid: No thyromegaly. Cardiovascular:      Rate and Rhythm: Normal rate and regular rhythm. Heart sounds: No murmur heard. Pulmonary:      Effort: Pulmonary effort is normal.      Breath sounds: No decreased breath sounds or wheezing. Abdominal:      Palpations: Abdomen is soft. There is no mass. Tenderness: There is no abdominal tenderness. There is no rebound. Musculoskeletal:      Cervical back: Normal range of motion and neck supple. Lymphadenopathy:      Cervical: No cervical adenopathy. ECHO DONE ON 12/20/2017  Conclusions      Summary   Normal left ventricle systolic function with an estimated ejection fraction   of 55%.   No regional wall motion abnormalities are seen.   Diastolic filling parameters suggest normal diastolic filing pressure.   Mild mitral regurgitation.   Individual aortic valve leaflets are not clearly visualized, aortic valve   appears sclerotic.   Moderate aortic regurgitation.   Systolic pulmonary artery pressure (SPAP) is normal and estimated at 15 mmHg   (RA pressure of 3 mmHg). Assessment:          Diagnosis Orders   1. Essential hypertension  Comprehensive Metabolic Panel    CBC Auto Differential    Uric Acid   2. Hyperlipidemia, unspecified hyperlipidemia type     3. Anxiety     4. Moderate aortic insufficiency     5. Mild mitral regurgitation     6. Pacemaker     7.  Need for influenza vaccination  INFLUENZA, HIGH-DOSE, QUADV, 65 YRS +, IM, PF, 0.7ML (FLUZONE) Plan:      Decrease calorie intake. Hypertension:  Well controlled. Hyperlipidemia:  At goal.   Anxiety: well controlled on Effexor XR and Wellbutrin XL. Mild MR and Moderate AI stable. AI not seen on last ECHO. Decrease calorie intake. Exercise,weight loss recommended. The current medical regimen is effective;  continue present plan and medications. See orders.

## 2021-10-04 ENCOUNTER — HOSPITAL ENCOUNTER (EMERGENCY)
Age: 71
Discharge: HOME | End: 2021-10-04
Attending: STUDENT IN AN ORGANIZED HEALTH CARE EDUCATION/TRAINING PROGRAM
Payer: COMMERCIAL

## 2021-10-04 ENCOUNTER — APPOINTMENT (OUTPATIENT)
Dept: CT IMAGING | Age: 71
End: 2021-10-04
Payer: COMMERCIAL

## 2021-10-04 VITALS
WEIGHT: 220 LBS | TEMPERATURE: 98.8 F | HEIGHT: 65 IN | OXYGEN SATURATION: 93 % | HEART RATE: 80 BPM | DIASTOLIC BLOOD PRESSURE: 85 MMHG | SYSTOLIC BLOOD PRESSURE: 144 MMHG | RESPIRATION RATE: 16 BRPM | BODY MASS INDEX: 36.65 KG/M2

## 2021-10-04 DIAGNOSIS — N20.0 KIDNEY STONE: Primary | ICD-10-CM

## 2021-10-04 DIAGNOSIS — N13.2 HYDRONEPHROSIS WITH URINARY OBSTRUCTION DUE TO RENAL CALCULUS: ICD-10-CM

## 2021-10-04 DIAGNOSIS — R10.9 LEFT FLANK PAIN: ICD-10-CM

## 2021-10-04 LAB
A/G RATIO: 1.2 (ref 1.1–2.2)
ALBUMIN SERPL-MCNC: 4.3 G/DL (ref 3.4–5)
ALP BLD-CCNC: 63 U/L (ref 40–129)
ALT SERPL-CCNC: 20 U/L (ref 10–40)
ANION GAP SERPL CALCULATED.3IONS-SCNC: 9 MMOL/L (ref 3–16)
AST SERPL-CCNC: 34 U/L (ref 15–37)
BACTERIA: ABNORMAL /HPF
BASOPHILS ABSOLUTE: 0.1 K/UL (ref 0–0.2)
BASOPHILS RELATIVE PERCENT: 0.6 %
BILIRUB SERPL-MCNC: 0.7 MG/DL (ref 0–1)
BILIRUBIN URINE: NEGATIVE
BLOOD, URINE: ABNORMAL
BUN BLDV-MCNC: 18 MG/DL (ref 7–20)
CALCIUM SERPL-MCNC: 9.6 MG/DL (ref 8.3–10.6)
CHLORIDE BLD-SCNC: 99 MMOL/L (ref 99–110)
CLARITY: CLEAR
CO2: 27 MMOL/L (ref 21–32)
COLOR: YELLOW
CREAT SERPL-MCNC: 1 MG/DL (ref 0.8–1.3)
EOSINOPHILS ABSOLUTE: 0.1 K/UL (ref 0–0.6)
EOSINOPHILS RELATIVE PERCENT: 0.7 %
GFR AFRICAN AMERICAN: >60
GFR NON-AFRICAN AMERICAN: >60
GLOBULIN: 3.5 G/DL
GLUCOSE BLD-MCNC: 127 MG/DL (ref 70–99)
GLUCOSE URINE: NEGATIVE MG/DL
HCT VFR BLD CALC: 44.4 % (ref 40.5–52.5)
HEMOGLOBIN: 15.5 G/DL (ref 13.5–17.5)
KETONES, URINE: NEGATIVE MG/DL
LEUKOCYTE ESTERASE, URINE: NEGATIVE
LIPASE: 47 U/L (ref 13–60)
LYMPHOCYTES ABSOLUTE: 0.9 K/UL (ref 1–5.1)
LYMPHOCYTES RELATIVE PERCENT: 9.5 %
MCH RBC QN AUTO: 30.5 PG (ref 26–34)
MCHC RBC AUTO-ENTMCNC: 35 G/DL (ref 31–36)
MCV RBC AUTO: 87.1 FL (ref 80–100)
MICROSCOPIC EXAMINATION: YES
MONOCYTES ABSOLUTE: 1 K/UL (ref 0–1.3)
MONOCYTES RELATIVE PERCENT: 10 %
NEUTROPHILS ABSOLUTE: 7.7 K/UL (ref 1.7–7.7)
NEUTROPHILS RELATIVE PERCENT: 79.2 %
NITRITE, URINE: NEGATIVE
PDW BLD-RTO: 14.1 % (ref 12.4–15.4)
PH UA: 6 (ref 5–8)
PLATELET # BLD: 189 K/UL (ref 135–450)
PMV BLD AUTO: 8 FL (ref 5–10.5)
POTASSIUM REFLEX MAGNESIUM: 4.3 MMOL/L (ref 3.5–5.1)
PROTEIN UA: NEGATIVE MG/DL
RBC # BLD: 5.1 M/UL (ref 4.2–5.9)
RBC UA: ABNORMAL /HPF (ref 0–4)
SODIUM BLD-SCNC: 135 MMOL/L (ref 136–145)
SPECIFIC GRAVITY UA: 1.02 (ref 1–1.03)
TOTAL PROTEIN: 7.8 G/DL (ref 6.4–8.2)
URINE TYPE: ABNORMAL
UROBILINOGEN, URINE: 0.2 E.U./DL
WBC # BLD: 9.7 K/UL (ref 4–11)
WBC UA: ABNORMAL /HPF (ref 0–5)

## 2021-10-04 PROCEDURE — 80053 COMPREHEN METABOLIC PANEL: CPT

## 2021-10-04 PROCEDURE — 85025 COMPLETE CBC W/AUTO DIFF WBC: CPT

## 2021-10-04 PROCEDURE — 6360000002 HC RX W HCPCS: Performed by: STUDENT IN AN ORGANIZED HEALTH CARE EDUCATION/TRAINING PROGRAM

## 2021-10-04 PROCEDURE — 99283 EMERGENCY DEPT VISIT LOW MDM: CPT

## 2021-10-04 PROCEDURE — 74176 CT ABD & PELVIS W/O CONTRAST: CPT

## 2021-10-04 PROCEDURE — 83690 ASSAY OF LIPASE: CPT

## 2021-10-04 PROCEDURE — 6370000000 HC RX 637 (ALT 250 FOR IP): Performed by: STUDENT IN AN ORGANIZED HEALTH CARE EDUCATION/TRAINING PROGRAM

## 2021-10-04 PROCEDURE — 96374 THER/PROPH/DIAG INJ IV PUSH: CPT

## 2021-10-04 PROCEDURE — 81001 URINALYSIS AUTO W/SCOPE: CPT

## 2021-10-04 RX ORDER — KETOROLAC TROMETHAMINE 10 MG/1
10 TABLET, FILM COATED ORAL EVERY 6 HOURS PRN
Qty: 20 TABLET | Refills: 0 | Status: SHIPPED | OUTPATIENT
Start: 2021-10-04 | End: 2022-05-16

## 2021-10-04 RX ORDER — TAMSULOSIN HYDROCHLORIDE 0.4 MG/1
0.4 CAPSULE ORAL DAILY
Qty: 5 CAPSULE | Refills: 0 | Status: SHIPPED | OUTPATIENT
Start: 2021-10-04 | End: 2022-09-20

## 2021-10-04 RX ORDER — KETOROLAC TROMETHAMINE 30 MG/ML
15 INJECTION, SOLUTION INTRAMUSCULAR; INTRAVENOUS ONCE
Status: COMPLETED | OUTPATIENT
Start: 2021-10-04 | End: 2021-10-04

## 2021-10-04 RX ORDER — ONDANSETRON 4 MG/1
4 TABLET, ORALLY DISINTEGRATING ORAL ONCE
Status: COMPLETED | OUTPATIENT
Start: 2021-10-04 | End: 2021-10-04

## 2021-10-04 RX ORDER — ONDANSETRON 4 MG/1
4 TABLET, ORALLY DISINTEGRATING ORAL EVERY 8 HOURS PRN
Qty: 20 TABLET | Refills: 0 | Status: SHIPPED | OUTPATIENT
Start: 2021-10-04 | End: 2021-10-11

## 2021-10-04 RX ADMIN — ONDANSETRON 4 MG: 4 TABLET, ORALLY DISINTEGRATING ORAL at 08:17

## 2021-10-04 RX ADMIN — KETOROLAC TROMETHAMINE 15 MG: 30 INJECTION, SOLUTION INTRAMUSCULAR at 08:17

## 2021-10-04 ASSESSMENT — PAIN DESCRIPTION - ORIENTATION: ORIENTATION: LEFT

## 2021-10-04 ASSESSMENT — PAIN DESCRIPTION - PAIN TYPE: TYPE: ACUTE PAIN

## 2021-10-04 ASSESSMENT — PAIN SCALES - GENERAL
PAINLEVEL_OUTOF10: 4
PAINLEVEL_OUTOF10: 0
PAINLEVEL_OUTOF10: 4

## 2021-10-04 ASSESSMENT — PAIN DESCRIPTION - LOCATION: LOCATION: FLANK

## 2021-10-04 NOTE — ED PROVIDER NOTES
[facility]  ED      CHIEF COMPLAINT  Left flank pain     HISTORY OF PRESENT ILLNESS  Sabrina Allison is a 66 y.o. male with past medical history of hypertension, hyperlipidemia, prostate hypertrophy, aortic insufficiency, pacemaker, mitral regurgitation who presents to the ED complaining of back pain. Sabrina Allison presents with acute left flank pain pain that radiates to LLQ. The pain is sharp, started Friday from no known injury. 5/10. Onset was sudden, worse with nothing in particular, improves with change in body position. Has not tried anything for pain at home. Sabrina Allison also denies bladder incontinence, bladder urgency, bowel incontinence, bowel urgency, numbness, tingling and weakness. Denies hematuria or dysuria. Denies diarrhea. Does report mild nausea, no vomiting. Denies chest pain or shortness of breath. Back Pain Red Flags:   IV drug abuse? No   Fever without source? No   Systemic illness? No   Immunosuppressed? No   Recent surgery/procedure? No   Incontinence or retention? No   Indwelling lizarraga? No   Alcohol abuse? No      Diabetes? No   Night pain? No   3rd visit in 20 days? No   Renal failure? No   Total: 0        Old records reviewed: Seen by PCP on 9/22. No concerns at that time. Reassuring labs at that time. No other complaints, modifying factors or associated symptoms. I have reviewed the following from the nursing documentation.     Past Medical History:   Diagnosis Date    Anxiety 12/21/2012    Hyperlipidemia     Hypertension     Hypertrophy of prostate without urinary obstruction     Mild mitral regurgitation 3/6/2018    Moderate aortic insufficiency 3/6/2018     Past Surgical History:   Procedure Laterality Date    CARDIAC PACEMAKER PLACEMENT  04/19/2019    CATARACT REMOVAL WITH IMPLANT Left 05/01/2019    COLONOSCOPY  4/21/2008    INTRACAPSULAR CATARACT EXTRACTION Left 5/1/2019    PHACO EMULSIFICATION OF CATARACT WITH  INTRAOCULAR LENS IMPLANT performed by Cory Gauthier MD at [address]  2019    SKIN BIOPSY Left 3/12/2021    WIDE LOCAL EXCISION OF LEFT EAR LESION WITH FROZEN SECTION, performed by Kadeem Barrientos MD at [place] [address] Left 3/12/2021    FULL THICKNESS SKIN GRAFT performed by Kadeem Barrientos MD at [address] [facility] Left 2018     Family History   Problem Relation Age of Onset    Cancer Brother 62        prostate cancer    Other Mother         Lung fibrosis    Cancer Father         Prostate cancer    No Known Problems Sister      Social History     Socioeconomic History    Marital status:      Spouse name: Not on file    Number of children: Not on file    Years of education: Not on file    Highest education level: Not on file   Occupational History    Not on file   Tobacco Use    Smoking status: Never Smoker    Smokeless tobacco: Never Used   Vaping Use    Vaping Use: Never used   Substance and Sexual Activity    Alcohol use: Not Currently     Comment: rarely    Drug use: No    Sexual activity: Yes     Partners: Female   Other Topics Concern    Not on file   Social History Narrative    Not on file     Social Determinants of Health     Financial Resource Strain:     Difficulty of Paying Living Expenses:    Food Insecurity:     Worried About [address] in the Last Year:     [address] [facility] in the Last Year:    Transportation Needs:     Lack of Transportation (Medical):      Lack of Transportation (Non-Medical):    Physical Activity:     Days of Exercise per Week:     Minutes of Exercise per Session:    Stress:     Feeling of Stress :    Social Connections:     Frequency of Communication with Friends and Family:     Frequency of Social Gatherings with Friends and Family:     Attends Bahai Services:     Active Member of Clubs or Organizations:     Attends Club or Organization Meetings:     Marital Status:    Intimate Partner Violence:     Fear of Current or Ex-Partner:     Emotionally Abused:     Physically Abused:     Sexually Abused:      No current facility-administered medications for this encounter. Current Outpatient Medications   Medication Sig Dispense Refill    telmisartan-hydrochlorothiazide (MICARDIS HCT) 80-25 MG per tablet TAKE ONE TABLET DAILY 90 tablet 3    pravastatin (PRAVACHOL) 80 MG tablet TAKE ONE TABLET DAILY AT BEDTIME 90 tablet 3    buPROPion (WELLBUTRIN XL) 150 MG extended release tablet Take 1 tablet by mouth every morning 90 tablet 3    venlafaxine (EFFEXOR XR) 75 MG extended release capsule TAKE ONE CAPSULE BY MOUTH DAILY (Patient not taking: Reported on 10/4/2021) 90 capsule 3    escitalopram (LEXAPRO) 10 MG tablet Take 1 tablet by mouth daily (Patient not taking: Reported on 7/14/2021) 90 tablet 3     No Known Allergies    REVIEW OF SYSTEMS  All systems reviewed, pertinent positives per HPI otherwise noted to be negative. PHYSICAL EXAM  BP (!) 162/81   Pulse 73   Temp 98.8 °F (37.1 °C) (Oral)   Resp 16   Ht 5' 5\" (1.651 m)   Wt 220 lb (99.8 kg)   SpO2 96%   BMI 36.61 kg/m²    GENERAL APPEARANCE: Awake and alert. Cooperative. no distress. HENT: Normocephalic. Atraumatic. Mucous membranes are moist  NECK: Supple. Full range of motion of the neck without stiffness or pain. EYES: PERRL. EOM's grossly intact. HEART/CHEST: RRR. No murmurs. LUNGS: Respirations unlabored. CTAB. Good air exchange. Speaking comfortably in full sentences. ABDOMEN: Left lower quadrant tenderness. Soft. Non-distended. No masses. No organomegaly. No guarding or rebound. no palpable pulsatile masses. BACK: No cervical, thoracic, or lumbar spinal tenderness to palpation. No CVA tenderness. MUSCULOSKELETAL: No extremity edema. Compartments soft. No deformity. No tenderness in the extremities. All extremities neurovascularly intact. SKIN: Warm and dry. No acute rashes. NEUROLOGICAL: Awake, alert and oriented x 3.  Power intact at the hips, knees, ankles, and toes. Sensation is intact to light touch in the lower extremities. Patellar DTRs intact. Patient denies saddle anesthesia. No ataxia. PSYCHIATRIC: Normal mood and affect. LABS  I have reviewed all labs for this visit.    Results for orders placed or performed during the hospital encounter of 10/04/21   CBC Auto Differential   Result Value Ref Range    WBC 9.7 4.0 - 11.0 K/uL    RBC 5.10 4.20 - 5.90 M/uL    Hemoglobin 15.5 13.5 - 17.5 g/dL    Hematocrit 44.4 40.5 - 52.5 %    MCV 87.1 80.0 - 100.0 fL    MCH 30.5 26.0 - 34.0 pg    MCHC 35.0 31.0 - 36.0 g/dL    RDW 14.1 12.4 - 15.4 %    Platelets 209 529 - 628 K/uL    MPV 8.0 5.0 - 10.5 fL    Neutrophils % 79.2 %    Lymphocytes % 9.5 %    Monocytes % 10.0 %    Eosinophils % 0.7 %    Basophils % 0.6 %    Neutrophils Absolute 7.7 1.7 - 7.7 K/uL    Lymphocytes Absolute 0.9 (L) 1.0 - 5.1 K/uL    Monocytes Absolute 1.0 0.0 - 1.3 K/uL    Eosinophils Absolute 0.1 0.0 - 0.6 K/uL    Basophils Absolute 0.1 0.0 - 0.2 K/uL   Comprehensive Metabolic Panel w/ Reflex to MG   Result Value Ref Range    Sodium 135 (L) 136 - 145 mmol/L    Potassium reflex Magnesium 4.3 3.5 - 5.1 mmol/L    Chloride 99 99 - 110 mmol/L    CO2 27 21 - 32 mmol/L    Anion Gap 9 3 - 16    Glucose 127 (H) 70 - 99 mg/dL    BUN 18 7 - 20 mg/dL    CREATININE 1.0 0.8 - 1.3 mg/dL    GFR Non-African American >60 >60    GFR African American >60 >60    Calcium 9.6 8.3 - 10.6 mg/dL    Total Protein 7.8 6.4 - 8.2 g/dL    Albumin 4.3 3.4 - 5.0 g/dL    Albumin/Globulin Ratio 1.2 1.1 - 2.2    Total Bilirubin 0.7 0.0 - 1.0 mg/dL    Alkaline Phosphatase 63 40 - 129 U/L    ALT 20 10 - 40 U/L    AST 34 15 - 37 U/L    Globulin 3.5 g/dL   Lipase   Result Value Ref Range    Lipase 47.0 13.0 - 60.0 U/L   Urinalysis, reflex to microscopic   Result Value Ref Range    Color, UA Yellow Straw/Yellow    Clarity, UA Clear Clear    Glucose, Ur Negative Negative mg/dL    Bilirubin Urine Negative Negative    Ketones, Urine Negative Negative mg/dL    Specific Gravity, UA 1.020 1.005 - 1.030    Blood, Urine MODERATE (A) Negative    pH, UA 6.0 5.0 - 8.0    Protein, UA Negative Negative mg/dL    Urobilinogen, Urine 0.2 <2.0 E.U./dL    Nitrite, Urine Negative Negative    Leukocyte Esterase, Urine Negative Negative    Microscopic Examination YES     Urine Type NotGiven    Microscopic Urinalysis   Result Value Ref Range    WBC, UA 3-5 0 - 5 /HPF    RBC, UA 11-20 (A) 0 - 4 /HPF    Bacteria, UA Rare (A) None Seen /HPF       RADIOLOGY  CT ABDOMEN PELVIS WO CONTRAST Additional Contrast? None   Final Result   5 mm mid left ureteral calculus with mild left hydronephrosis and hydroureter. Diverticulosis, without diverticulitis. ED COURSE / MDM  Patient seen and evaluated. Old records reviewed and pertinent information included in HPI. Labs and imaging reviewed and results discussed with patient. Overall well appearing patient, in no acute distress, presenting for left flank pain. Physical exam remarkable for lower quadrant tenderness palpation, no CVA tenderness. Differential diagnosis includes but is not limited to: Kidney stone, UTI, pyelonephritis, diverticulitis musculoskeletal pain, spinal stenosis, sciatica, disc herniation, degenerative joint disease, low suspicion for spinal fracture, epidural abscess, cauda equina, AAA, aortic dissection    Labs and imaging obtained. Work-up showed:    ED Course as of Oct 04 1844   [facility] Oct 04, 2021   0820 Urinalysis shows moderate blood. No evidence of infection. Will obtain CT to assess for kidney stone given I feel this most likely diagnosis based off of reported symptoms. [ER]   0820 No leukocytosis, anemia, thrombocytopenia. [ER]   0831 Lipase within normal limits, low suspicion for pancreatitis.     [ER]   0831 No significant electrolyte abnormalities or evidence of kidney dysfunction.    [ER]   3597 Liver function testing unremarkable, low suspicion for hepatitis or other acute liver pathology. [ER]   5020 CT abd/pelvis: IMPRESSION:  5 mm mid left ureteral calculus with mild left hydronephrosis and hydroureter.     Diverticulosis, without diverticulitis. [ER]   1610 Patient reports that pain is resolved with Toradol in the emergency department. Patient is comfortable with discharge and follow-up on an outpatient basis. [ER]      ED Course User Index  [ER] Kolby Davis MD        During the patient's ED course, the patient was given:  Medications   ondansetron (ZOFRAN-ODT) disintegrating tablet 4 mg (4 mg Oral Given 10/4/21 0817)   ketorolac (TORADOL) injection 15 mg (15 mg IntraVENous Given 10/4/21 0817)      I completed a structured, evidence-based clinical evaluation to screen for acute non-traumatic spinal emergencies. The patient has a normal detailed neurologic exam and a low red flag score. The evidence indicates that the patient is very low risk for an acute spinal emergency and this is consistent with my clinical intuition. The risk of further workup is higher than the likelihood of the patient having a spinal epidural abscess or other dangerous emergency spinal condition. It is, therefore, in the patients best interest not to do additional emergent testing at this time. I have discussed with the patient my clinical impression and the result of an evidence-based clinical evaluation to screen for spinal epidural abscess and other spinal emergencies, as well as the risk of further testing and hospitalization. The patient agrees with not pursuing further emergent evaluation for causes of back pain at this time. Symptoms and work-up consistent with kidney stone. Kidney stone is 5 mm, may pass on its own. We did discuss that might not pass on the patient may require intervention. No evidence of KUSH or UTI. Patient had significant improvement of pain with treatment in the emergency department.   He feels comfortable with discharge home to follow-up with urology. At this time, feel the patient is appropriate for discharge to follow-up with a primary care doctor and urology. Patient feels comfortable with discharge at this time. Patient was provided with prescriptions for Toradol, Zofran, Flomax. Strict return precautions given. Encouraged PCP follow-up in 2 days, urology follow-up as soon as possible. Patient discharged in stable condition. I estimate there is LOW risk for RAPIDLY EXPANDING OR RUPTURED AAA, AORTIC DISSECTION, CAUDA EQUINA SYNDROME, EPIDURAL MASS LESION, EPIDURAL ABSCESS, SPINAL STENOSIS, OR HERNIATED DISK CAUSING SEVERE STENOSIS thus I consider the discharge disposition reasonable. Erika Long (or their surrogate) and I have discussed the diagnosis and risks, and we agree with discharging home with close follow-up. We also discussed returning to the Emergency Department immediately if new or worsening symptoms occur. We have discussed the symptoms which are most concerning that necessitate immediate return. CLINICAL IMPRESSION  1. Kidney stone    2. Left flank pain    3. Hydronephrosis with urinary obstruction due to renal calculus        Blood pressure (!) 144/85, pulse 80, temperature 98.8 °F (37.1 °C), temperature source Oral, resp. rate 16, height 5' 5\" (1.651 m), weight 220 lb (99.8 kg), SpO2 93 %. [facility] was discharged to home in stable condition. Patient was given scripts for the following medications. I counseled patient how to take these medications.    Discharge Medication List as of 10/4/2021  9:53 AM      START taking these medications    Details   ketorolac (TORADOL) 10 MG tablet Take 1 tablet by mouth every 6 hours as needed for Pain, Disp-20 tablet, R-0Normal      ondansetron (ZOFRAN-ODT) 4 MG disintegrating tablet Place 1 tablet under the tongue every 8 hours as needed for Nausea or Vomiting May Sub regular tablet (non-ODT) if insurance does not cover ODT., Disp-20 tablet, R-0Normal      tamsulosin (FLOMAX) 0.4 MG capsule Take 1 capsule by mouth daily for 5 days, Disp-5 capsule, R-0Normal             Follow-up with:  León Chandler MD  [address] [facility] [address], [facility] [phone] [facility] [address] [facility]  [phone]    Schedule an appointment as soon as possible for a visit       Monica Martinez MD  [address] [phone]    Schedule an appointment as soon as possible for a visit in 2 days      [facility]  ED  [address] [facility] [address] [facility]  Go to   As needed, If symptoms worsen      DISCLAIMER: This chart was created using Dragon dictation software. Efforts were made by me to ensure accuracy, however some errors may be present due to limitations of this technology and occasionally words are not transcribed correctly.         Elsy Davies MD  10/04/21 0144

## 2021-10-06 ENCOUNTER — TELEPHONE (OUTPATIENT)
Dept: INTERNAL MEDICINE CLINIC | Age: 71
End: 2021-10-06

## 2021-10-06 DIAGNOSIS — N40.0 HYPERTROPHY OF PROSTATE WITHOUT URINARY OBSTRUCTION: Primary | ICD-10-CM

## 2021-10-06 NOTE — TELEPHONE ENCOUNTER
----- Message from Marly Morillo MD sent at 10/6/2021  1:30 PM EDT -----  Contact: Indigo Reed [phone]  Have him see someone at [facility] urology. ----- Message -----  From: Dianne Lou  Sent: 10/6/2021  11:17 AM EDT  To: Marly Morillo MD    Patient would like a referral to a Urologist. He went to see Dr. Carly Choudhury and got impatient after waiting 45 minutes and left.      Thank you

## 2021-10-11 ENCOUNTER — HOSPITAL ENCOUNTER (OUTPATIENT)
Age: 71
Discharge: HOME | End: 2021-10-11
Payer: COMMERCIAL

## 2021-10-11 ENCOUNTER — HOSPITAL ENCOUNTER (OUTPATIENT)
Dept: GENERAL RADIOLOGY | Age: 71
Discharge: HOME | End: 2021-10-11
Payer: COMMERCIAL

## 2021-10-11 DIAGNOSIS — R10.9 ABDOMINAL PAIN, UNSPECIFIED ABDOMINAL LOCATION: ICD-10-CM

## 2021-10-11 DIAGNOSIS — N13.2 HYDRONEPHROSIS WITH RENAL AND URETERAL CALCULOUS OBSTRUCTION: ICD-10-CM

## 2021-10-11 PROCEDURE — 74018 RADEX ABDOMEN 1 VIEW: CPT

## 2021-10-12 ENCOUNTER — NURSE ONLY (OUTPATIENT)
Dept: CARDIOLOGY CLINIC | Age: 71
End: 2021-10-12
Payer: COMMERCIAL

## 2021-10-12 DIAGNOSIS — Z95.0 PACEMAKER: ICD-10-CM

## 2021-10-12 DIAGNOSIS — I44.2 COMPLETE HEART BLOCK (HCC): ICD-10-CM

## 2021-10-12 NOTE — PROGRESS NOTES
Remote transmission received for patient's dual chamber PACEMAKER. Transmission shows normal sensing and pacing function. EP physician will review. See interrogation under the cardiology tab in the [address] [place] [address] [address] field for more details. Will continue to monitor remotely. Pacing (% of Time Since 14-Jul-2021)   98.6% (MVP Off)  AP 5.5%   AT/AF egms show FFTWOS.

## 2021-10-14 PROCEDURE — 93296 REM INTERROG EVL PM/IDS: CPT | Performed by: INTERNAL MEDICINE

## 2021-10-14 PROCEDURE — 93294 REM INTERROG EVL PM/LDLS PM: CPT | Performed by: INTERNAL MEDICINE

## 2021-11-02 ENCOUNTER — HOSPITAL ENCOUNTER (OUTPATIENT)
Dept: ULTRASOUND IMAGING | Age: 71
Discharge: HOME | End: 2021-11-02
Payer: COMMERCIAL

## 2021-11-02 DIAGNOSIS — N13.2 HYDRONEPHROSIS WITH RENAL AND URETERAL CALCULUS OBSTRUCTION: ICD-10-CM

## 2021-11-02 DIAGNOSIS — R10.9 ABDOMINAL PAIN, UNSPECIFIED ABDOMINAL LOCATION: ICD-10-CM

## 2021-11-02 PROCEDURE — 76770 US EXAM ABDO BACK WALL COMP: CPT

## 2022-01-11 ENCOUNTER — NURSE ONLY (OUTPATIENT)
Dept: CARDIOLOGY CLINIC | Age: 72
End: 2022-01-11

## 2022-01-11 DIAGNOSIS — Z95.0 PACEMAKER: ICD-10-CM

## 2022-01-12 ENCOUNTER — OFFICE VISIT (OUTPATIENT)
Dept: CARDIOLOGY CLINIC | Age: 72
End: 2022-01-12
Payer: COMMERCIAL

## 2022-01-12 ENCOUNTER — NURSE ONLY (OUTPATIENT)
Dept: CARDIOLOGY CLINIC | Age: 72
End: 2022-01-12
Payer: COMMERCIAL

## 2022-01-12 VITALS
HEART RATE: 79 BPM | WEIGHT: 221.5 LBS | BODY MASS INDEX: 36.9 KG/M2 | HEIGHT: 65 IN | DIASTOLIC BLOOD PRESSURE: 86 MMHG | OXYGEN SATURATION: 97 % | SYSTOLIC BLOOD PRESSURE: 136 MMHG

## 2022-01-12 DIAGNOSIS — Z95.0 PACEMAKER: Primary | ICD-10-CM

## 2022-01-12 DIAGNOSIS — I44.2 COMPLETE HEART BLOCK (HCC): ICD-10-CM

## 2022-01-12 DIAGNOSIS — I44.7 LBBB (LEFT BUNDLE BRANCH BLOCK): Primary | ICD-10-CM

## 2022-01-12 DIAGNOSIS — Z95.0 PACEMAKER: ICD-10-CM

## 2022-01-12 PROCEDURE — 93000 ELECTROCARDIOGRAM COMPLETE: CPT | Performed by: INTERNAL MEDICINE

## 2022-01-12 PROCEDURE — 99214 OFFICE O/P EST MOD 30 MIN: CPT | Performed by: INTERNAL MEDICINE

## 2022-01-12 NOTE — LETTER
event, NORIS 4.5 years. Today, 1/12/2022, he states that he is doing well. He is currently working doing construction. He is taking his medications as prescribed. Patient denies current edema, chest pain, sob, palpitations, dizziness or syncope. Past Medical History:   has a past medical history of Anxiety, Hyperlipidemia, Hypertension, Hypertrophy of prostate without urinary obstruction, Mild mitral regurgitation, and Moderate aortic insufficiency. Surgical History:   has a past surgical history that includes Colonoscopy (4/21/2008); vitrectomy (Left, 2018); Cataract removal with implant (Left, 05/01/2019); Intracapsular cataract extraction (Left, 5/1/2019); Cardiac pacemaker placement (04/19/2019); pacemaker placement (2019); skin biopsy (Left, 3/12/2021); and Skin graft (Left, 3/12/2021). Social History:   reports that he has never smoked. He has never used smokeless tobacco. He reports previous alcohol use. He reports that he does not use drugs. Family History:  family history includes Cancer in his father; Cancer (age of onset: 62) in his brother; No Known Problems in his sister; Other in his mother.      Home Medications:  Outpatient Encounter Medications as of 1/12/2022   Medication Sig Dispense Refill    pravastatin (PRAVACHOL) 80 MG tablet TAKE ONE TABLET DAILY AT BEDTIME 90 tablet 3    buPROPion (WELLBUTRIN XL) 150 MG extended release tablet Take 1 tablet by mouth every morning 90 tablet 3    ketorolac (TORADOL) 10 MG tablet Take 1 tablet by mouth every 6 hours as needed for Pain (Patient not taking: Reported on 1/12/2022) 20 tablet 0    tamsulosin (FLOMAX) 0.4 MG capsule Take 1 capsule by mouth daily for 5 days (Patient not taking: Reported on 1/12/2022) 5 capsule 0    venlafaxine (EFFEXOR XR) 75 MG extended release capsule TAKE ONE CAPSULE BY MOUTH DAILY (Patient not taking: Reported on 10/4/2021) 90 capsule 3    telmisartan-hydrochlorothiazide (MICARDIS HCT) 80-25 MG per tablet TAKE ONE TABLET DAILY (Patient not taking: Reported on 1/12/2022) 90 tablet 3    escitalopram (LEXAPRO) 10 MG tablet Take 1 tablet by mouth daily (Patient not taking: Reported on 7/14/2021) 90 tablet 3     No facility-administered encounter medications on file as of 1/12/2022. Allergies:  Patient has no known allergies. Review of Systems   Constitutional: Negative. HENT: Negative. Eyes: Negative. Respiratory: Negative. Cardiovascular: Negative. Gastrointestinal: Negative. Genitourinary: Negative. Musculoskeletal: Negative. Skin: Negative. Neurological: Negative. Hematological: Negative. Psychiatric/Behavioral: Negative. /86   Pulse 79   Ht 5' 5\" (1.651 m)   Wt 221 lb 8 oz (100.5 kg)   SpO2 97%   BMI 36.86 kg/m²     DATA:    ECG 1/12/22: Personally reviewed. All current cardiac medications reviewed and adjusted accordingly  1/12/22    Device interrogation reviewed and adjusted accordingly 1/12/22    ECHO: 4/20/19: Summary   Normal left ventricular systolic function with ejection fraction of 55-60%. The study is inadequate to exclude regional wall motion abnormalities. Systolic pulmonic artery pressure (SPAP) is normal estimated at 24 mmHg   (Right atrial pressure of 3 mmHg). Compared to previous study from 12- no changes noted. Objective:  Physical Exam   Constitutional: He is oriented to person, place, and time. He appears well-developed and well-nourished. HENT:   Head: Normocephalic and atraumatic. Eyes: Pupils are equal, round, and reactive to light. Neck: Normal range of motion. Cardiovascular: Normal rate, regular rhythm and normal heart sounds. Pulmonary/Chest: Effort normal and breath sounds normal.   Abdominal: Soft. No tenderness. Musculoskeletal: Normal range of motion. He exhibits no edema. Neurological: He is alert and oriented to person, place, and time. Skin: Skin is warm and dry.    Psychiatric: He has a normal mood and affect. Assessment:  1. CHB- s/p dual PPM with HBP placed 4/2019. Normal function from device. 2.  Dual chamber PPM 4/19/19- non selective HIS capture. When changed from nonselective to selective, RV output 1.5V @ 0.5msec. When changed from selective to nonselective, RV output 1.0V @ 0.5msec. At the conclusion of 7/14/2021 visit, RV output changed to 2.5V @ 0.5msec which increased the longevity from 4.9 years to 6.4 years. On 1/12/2022, no changes made to device settings. He now appears to have nonselective His bundle pacing at normal as well as high outputs. 3. NS His bundle capture     Plan:  1. Continue with current medications as prescribed. 2. Continue with remote device checks every 3 months. 3. Follow up in 6 months with EP NP.             I, Eric Mcleod RN, am scribing for and in the presence of Dr. Sarahi Breen. 01/12/22 10:32 AM   Eric Mcleod RN        I, Dr. Sarahi Breen, personally performed the services described in this documentation as scribed by Eric Mcleod RN  in my presence, and it is both accurate and complete.         Sarahi Breen M.D.

## 2022-01-12 NOTE — PROGRESS NOTES
Patient presents to the device clinic today for a programming evaluation for his pacemaker. Patient has a history of CHB. Last device interrogation was on 10/12. Since then, 1 NSVT event recorded. LOC noted today at 2V @ .5ms. which is increased from last in office check on 7/14 at [facility] @ .5ms. P wave: 2.8 mV  R wave: 2.4 mV    AP 10.8%  98.2%    All sensing and pacing parameters are within normal range. No changes need to be made at this time. Patient will see Dr. Wilda Uriarte today in office. Patient education was provided about device functionality, in home monitoring, and any other patient questions and/or concerns were addressed. Patient voices understanding. Patient will follow up in 3 months in office or remotely. Please see interrogation for more detail - Paceart report located under the Cardiology tab.

## 2022-01-12 NOTE — PROGRESS NOTES
[address]   Cardiac Consultation    Date: 1/12/22  Patient Name: Donald Francisco  YOB: 1950    Primary Care Physician: Binu Breen MD    CHIEF COMPLAINT:   Chief Complaint   Patient presents with    6 Month Follow-Up    Other     device management         HPI:  Donald Francisco is a 66 y.o. male with PMH of HTN, HLD, and moderate AI. 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 S/p dual chamber PPM (4/19/19) with RV lead implanted at the His bundle. EKG on 5/23/19 showed AS- with HR of 84, consistent with non selective His bundle pacing. Echo (4/20/19) showed EF of 55-60%. Device interrogation 11/19/19 showed normally functioning PPM.  He reported he felt symptomatic improvement since his PPM implant. He is very active with good exercise tolerance. On 8/4/20 he presented to the ED for bradycardia and dizziness. His device check at that time showed increased His lead threshold with intermittent capture and output was increased to 5V at 1ms. His device check on 8/13/20 showed ongoing need for His lead output of 5V @ 1ms and draining battery, AP 2% and  100% with no arrhythmias. His device check 8/17/20 demonstrates RV threshold 1.0v at 1.0 miliseconds with unipolar pacing,  1.5v at 1.0 msec with bipolar pacing. He reported he is no longer having the dizziness or bradycardia. At the conclusion of 8/2020 visit, RV output changed to 2.5V at 1 msec unipolar. On 7/14/2021, patient's device interrogation demonstrated AP 2.9%,  98.9%, no new events, 4.1 years until RRT. He reported that his energy level has improved since his device was placed in 2019. He reported that he recently experiences an episodes of chest tightness that he attributes to being 5 feet away from a generator. Interval History since last office visit: Device interrogation demonstrated AP 10.8%,  98.2%, events -1 NSVT event, NORIS 4.5 years.  Today, 1/12/2022, he states that he is doing well. He is currently working doing construction. He is taking his medications as prescribed. Patient denies current edema, chest pain, sob, palpitations, dizziness or syncope. Past Medical History:   has a past medical history of Anxiety, Hyperlipidemia, Hypertension, Hypertrophy of prostate without urinary obstruction, Mild mitral regurgitation, and Moderate aortic insufficiency. Surgical History:   has a past surgical history that includes Colonoscopy (4/21/2008); vitrectomy (Left, 2018); Cataract removal with implant (Left, 05/01/2019); Intracapsular cataract extraction (Left, 5/1/2019); Cardiac pacemaker placement (04/19/2019); pacemaker placement (2019); skin biopsy (Left, 3/12/2021); and Skin graft (Left, 3/12/2021). Social History:   reports that he has never smoked. He has never used smokeless tobacco. He reports previous alcohol use. He reports that he does not use drugs. Family History:  family history includes Cancer in his father; Cancer (age of onset: 62) in his brother; No Known Problems in his sister; Other in his mother.      Home Medications:  Outpatient Encounter Medications as of 1/12/2022   Medication Sig Dispense Refill    pravastatin (PRAVACHOL) 80 MG tablet TAKE ONE TABLET DAILY AT BEDTIME 90 tablet 3    buPROPion (WELLBUTRIN XL) 150 MG extended release tablet Take 1 tablet by mouth every morning 90 tablet 3    ketorolac (TORADOL) 10 MG tablet Take 1 tablet by mouth every 6 hours as needed for Pain (Patient not taking: Reported on 1/12/2022) 20 tablet 0    tamsulosin (FLOMAX) 0.4 MG capsule Take 1 capsule by mouth daily for 5 days (Patient not taking: Reported on 1/12/2022) 5 capsule 0    venlafaxine (EFFEXOR XR) 75 MG extended release capsule TAKE ONE CAPSULE BY MOUTH DAILY (Patient not taking: Reported on 10/4/2021) 90 capsule 3    telmisartan-hydrochlorothiazide (MICARDIS HCT) 80-25 MG per tablet TAKE ONE TABLET DAILY Assessment:  1. CHB- s/p dual PPM with HBP placed 4/2019. Normal function from device. 2.  Dual chamber PPM 4/19/19- non selective HIS capture. When changed from nonselective to selective, RV output 1.5V @ 0.5msec. When changed from selective to nonselective, RV output 1.0V @ 0.5msec. At the conclusion of 7/14/2021 visit, RV output changed to 2.5V @ 0.5msec which increased the longevity from 4.9 years to 6.4 years. On 1/12/2022, no changes made to device settings. He now appears to have nonselective His bundle pacing at normal as well as high outputs. 3. NS His bundle capture     Plan:  1. Continue with current medications as prescribed. 2. Continue with remote device checks every 3 months. 3. Follow up in 6 months with EP NP.             I, Jina Yang RN, am scribing for and in the presence of Dr. William Bone. 01/12/22 10:32 AM   Jina Yang RN        I, Dr. William Bone, personally performed the services described in this documentation as scribed by Jina Yang RN  in my presence, and it is both accurate and complete.         William Bone M.D.

## 2022-01-12 NOTE — PATIENT INSTRUCTIONS
Plan:  1. Continue with current medications as prescribed. 2. Continue with remote device checks every 3 months. 3. Follow up in 6 months with EP NP.

## 2022-01-17 PROCEDURE — 93280 PM DEVICE PROGR EVAL DUAL: CPT | Performed by: INTERNAL MEDICINE

## 2022-04-12 ENCOUNTER — NURSE ONLY (OUTPATIENT)
Dept: CARDIOLOGY CLINIC | Age: 72
End: 2022-04-12
Payer: COMMERCIAL

## 2022-04-12 DIAGNOSIS — I44.2 COMPLETE HEART BLOCK (HCC): ICD-10-CM

## 2022-04-12 DIAGNOSIS — Z95.0 PACEMAKER: ICD-10-CM

## 2022-04-12 NOTE — PROGRESS NOTES
Remote transmission received for patient's dual chamber PACEMAKER. Transmission shows normal sensing and pacing function. EP physician will review. See interrogation under the cardiology tab in the [address] [place] [address] [address] field for more details. Will continue to monitor remotely. Hx FFTWOS. Pacing (% of Time Since 12-Jan-2022)   97.9% (MVP Off)  AP 5.7%. Episodes Since: 12-Jan-2022  2 Non-sustained VT, longest 5 sec. Echo 4/2019- Normal left ventricular systolic function with ejection fraction of 55-60%. Dual chamber PPM 4/19/19 selective HIS capture < 2.75v.  His pacing threshold in bipolar mode is 1.5 V at 1 ms.  Since the pacing threshold in unipolar mode was lower, we reprogrammed him to unipolar pacing bipolar sensing.  With the output programmed to 2.5 V, he has selective His bundle pacing with right bundle branch block.  This change increased the battery life.

## 2022-04-15 PROCEDURE — 93294 REM INTERROG EVL PM/LDLS PM: CPT | Performed by: INTERNAL MEDICINE

## 2022-04-15 PROCEDURE — 93296 REM INTERROG EVL PM/IDS: CPT | Performed by: INTERNAL MEDICINE

## 2022-05-16 ENCOUNTER — OFFICE VISIT (OUTPATIENT)
Dept: INTERNAL MEDICINE CLINIC | Age: 72
End: 2022-05-16

## 2022-05-16 VITALS
DIASTOLIC BLOOD PRESSURE: 80 MMHG | RESPIRATION RATE: 12 BRPM | BODY MASS INDEX: 36.49 KG/M2 | HEART RATE: 70 BPM | HEIGHT: 65 IN | WEIGHT: 219 LBS | SYSTOLIC BLOOD PRESSURE: 150 MMHG

## 2022-05-16 DIAGNOSIS — I10 PRIMARY HYPERTENSION: ICD-10-CM

## 2022-05-16 DIAGNOSIS — E78.5 HYPERLIPIDEMIA, UNSPECIFIED HYPERLIPIDEMIA TYPE: ICD-10-CM

## 2022-05-16 DIAGNOSIS — Z95.0 PACEMAKER: ICD-10-CM

## 2022-05-16 DIAGNOSIS — F41.9 ANXIETY: ICD-10-CM

## 2022-05-16 DIAGNOSIS — N40.0 HYPERTROPHY OF PROSTATE WITHOUT URINARY OBSTRUCTION: ICD-10-CM

## 2022-05-16 DIAGNOSIS — I35.1 MODERATE AORTIC INSUFFICIENCY: ICD-10-CM

## 2022-05-16 DIAGNOSIS — I34.0 MILD MITRAL REGURGITATION: ICD-10-CM

## 2022-05-16 DIAGNOSIS — I10 PRIMARY HYPERTENSION: Primary | ICD-10-CM

## 2022-05-16 LAB
A/G RATIO: 1.4 (ref 1.1–2.2)
ALBUMIN SERPL-MCNC: 4.3 G/DL (ref 3.4–5)
ALP BLD-CCNC: 59 U/L (ref 40–129)
ALT SERPL-CCNC: 19 U/L (ref 10–40)
ANION GAP SERPL CALCULATED.3IONS-SCNC: 11 MMOL/L (ref 3–16)
AST SERPL-CCNC: 28 U/L (ref 15–37)
BASOPHILS ABSOLUTE: 0 K/UL (ref 0–0.2)
BASOPHILS RELATIVE PERCENT: 0.6 %
BILIRUB SERPL-MCNC: 0.6 MG/DL (ref 0–1)
BUN BLDV-MCNC: 13 MG/DL (ref 7–20)
CALCIUM SERPL-MCNC: 10 MG/DL (ref 8.3–10.6)
CHLORIDE BLD-SCNC: 104 MMOL/L (ref 99–110)
CHOLESTEROL, TOTAL: 162 MG/DL (ref 0–199)
CO2: 27 MMOL/L (ref 21–32)
CREAT SERPL-MCNC: 0.9 MG/DL (ref 0.8–1.3)
EOSINOPHILS ABSOLUTE: 0.1 K/UL (ref 0–0.6)
EOSINOPHILS RELATIVE PERCENT: 1.3 %
GFR AFRICAN AMERICAN: >60
GFR NON-AFRICAN AMERICAN: >60
GLUCOSE BLD-MCNC: 97 MG/DL (ref 70–99)
HCT VFR BLD CALC: 43.3 % (ref 40.5–52.5)
HDLC SERPL-MCNC: 54 MG/DL (ref 40–60)
HEMOGLOBIN: 15 G/DL (ref 13.5–17.5)
LDL CHOLESTEROL CALCULATED: 82 MG/DL
LYMPHOCYTES ABSOLUTE: 1.5 K/UL (ref 1–5.1)
LYMPHOCYTES RELATIVE PERCENT: 19.9 %
MCH RBC QN AUTO: 30.4 PG (ref 26–34)
MCHC RBC AUTO-ENTMCNC: 34.6 G/DL (ref 31–36)
MCV RBC AUTO: 88 FL (ref 80–100)
MONOCYTES ABSOLUTE: 0.7 K/UL (ref 0–1.3)
MONOCYTES RELATIVE PERCENT: 9.6 %
NEUTROPHILS ABSOLUTE: 5.2 K/UL (ref 1.7–7.7)
NEUTROPHILS RELATIVE PERCENT: 68.6 %
PDW BLD-RTO: 14.4 % (ref 12.4–15.4)
PLATELET # BLD: 187 K/UL (ref 135–450)
PMV BLD AUTO: 8.4 FL (ref 5–10.5)
POTASSIUM SERPL-SCNC: 3.6 MMOL/L (ref 3.5–5.1)
PROSTATE SPECIFIC ANTIGEN: 2.57 NG/ML (ref 0–4)
RBC # BLD: 4.92 M/UL (ref 4.2–5.9)
SODIUM BLD-SCNC: 142 MMOL/L (ref 136–145)
TOTAL PROTEIN: 7.3 G/DL (ref 6.4–8.2)
TRIGL SERPL-MCNC: 132 MG/DL (ref 0–150)
TSH REFLEX FT4: 3.15 UIU/ML (ref 0.27–4.2)
URIC ACID, SERUM: 7.4 MG/DL (ref 3.5–7.2)
VLDLC SERPL CALC-MCNC: 26 MG/DL
WBC # BLD: 7.5 K/UL (ref 4–11)

## 2022-05-16 PROCEDURE — 90750 HZV VACC RECOMBINANT IM: CPT | Performed by: INTERNAL MEDICINE

## 2022-05-16 PROCEDURE — 99214 OFFICE O/P EST MOD 30 MIN: CPT | Performed by: INTERNAL MEDICINE

## 2022-05-16 PROCEDURE — 90471 IMMUNIZATION ADMIN: CPT | Performed by: INTERNAL MEDICINE

## 2022-05-16 ASSESSMENT — ENCOUNTER SYMPTOMS
RHINORRHEA: 0
WHEEZING: 0
ABDOMINAL PAIN: 0
NAUSEA: 0
VOMITING: 0
SHORTNESS OF BREATH: 0
BACK PAIN: 0
COUGH: 0

## 2022-05-16 NOTE — PROGRESS NOTES
Subjective:      Patient ID: Sven Reis is a 66 y.o. . HPI  Patient is here for follow up of hypertension and hyperlipidemia. Patient's BP is controlled on current medications. No chest pain or dyspnea. His cholesterol is at goal. No myalgia. He has lost 6 lbs   His anxiety is controlled. He had a complete heart block and has a pacemaker. He has moderate AI and mild MR. No changes. His vision is stable. He has had a prior vitrectomy. He had a secondary cataract of the left eye and had laser done. It helped. His wife got COVID and he thinks he may have had it too. Review of Systems   Constitutional: Negative for activity change and appetite change. HENT: Negative for postnasal drip and rhinorrhea. Respiratory: Negative for cough, shortness of breath and wheezing. Cardiovascular: Negative for chest pain, palpitations and leg swelling. Gastrointestinal: Negative for abdominal pain, nausea and vomiting. Genitourinary: Negative for difficulty urinating and frequency. Musculoskeletal: Negative for back pain and joint swelling. Skin: Negative for rash. Neurological: Negative for light-headedness. Psychiatric/Behavioral: Negative for sleep disturbance. Current Outpatient Medications   Medication Instructions    buPROPion (WELLBUTRIN XL) 150 mg, Oral, EVERY MORNING    escitalopram (LEXAPRO) 10 mg, Oral, DAILY    pravastatin (PRAVACHOL) 80 MG tablet TAKE ONE TABLET DAILY AT BEDTIME    tamsulosin (FLOMAX) 0.4 mg, Oral, DAILY    telmisartan-hydrochlorothiazide (MICARDIS HCT) 80-25 MG per tablet TAKE ONE TABLET DAILY    venlafaxine (EFFEXOR XR) 75 MG extended release capsule TAKE ONE CAPSULE BY MOUTH DAILY          There are no changes to past medical history, family history, social history or review of systems(except as noted in the history section) since prior note (all reviewed with patient).     Ht 5' 5\" (1.651 m)   Wt 219 lb (99.3 kg)   BMI 36.44 kg/m²      Vitals: 05/16/22 1115 05/16/22 1144   BP: (!) 144/80 (!) 150/80   Site: Left Upper Arm Left Upper Arm   Position: Sitting Sitting   Cuff Size: Large Adult Large Adult   Pulse: 70    Resp: 12    Weight: 219 lb (99.3 kg)    Height: 5' 5\" (1.651 m)         Objective:   Physical Exam  Constitutional:       Appearance: He is well-developed. HENT:      Head: Normocephalic and atraumatic. Eyes:      General: No scleral icterus. Conjunctiva/sclera: Conjunctivae normal.      Pupils: Pupils are equal, round, and reactive to light. Neck:      Thyroid: No thyromegaly. Cardiovascular:      Rate and Rhythm: Normal rate and regular rhythm. Heart sounds: No murmur heard. Pulmonary:      Effort: Pulmonary effort is normal.      Breath sounds: No decreased breath sounds or wheezing. Abdominal:      Palpations: Abdomen is soft. There is no mass. Tenderness: There is no abdominal tenderness. There is no rebound. Musculoskeletal:      Cervical back: Normal range of motion and neck supple. Lymphadenopathy:      Cervical: No cervical adenopathy. ECHO DONE ON 12/20/2017  Conclusions      Summary   Normal left ventricle systolic function with an estimated ejection fraction   of 55%.   No regional wall motion abnormalities are seen.   Diastolic filling parameters suggest normal diastolic filing pressure.   Mild mitral regurgitation.   Individual aortic valve leaflets are not clearly visualized, aortic valve   appears sclerotic.   Moderate aortic regurgitation.   Systolic pulmonary artery pressure (SPAP) is normal and estimated at 15 mmHg   (RA pressure of 3 mmHg). Assessment:          Diagnosis Orders   1. Primary hypertension  Comprehensive Metabolic Panel    CBC with Auto Differential    Lipid Panel    Uric Acid    PSA, Prostatic Specific Antigen   2. Hyperlipidemia, unspecified hyperlipidemia type  TSH with Reflex to FT4   3. Hypertrophy of prostate without urinary obstruction     4.  Anxiety 5. Moderate aortic insufficiency     6. Mild mitral regurgitation     7. Pacemaker             Plan:      Decrease calorie intake. Hypertension:  Well controlled. High today even on recheck. Monitor at home. Weight loss recommended. Hyperlipidemia:  At goal.   Anxiety: well controlled on Effexor XR and Wellbutrin XL. Mild MR and Moderate AI stable. AI not seen on last ECHO. Decrease calorie intake. Exercise,weight loss recommended. The current medical regimen is effective;  continue present plan and medications. See orders.

## 2022-06-07 ENCOUNTER — OFFICE VISIT (OUTPATIENT)
Dept: CARDIOLOGY CLINIC | Age: 72
End: 2022-06-07
Payer: COMMERCIAL

## 2022-06-07 ENCOUNTER — NURSE ONLY (OUTPATIENT)
Dept: CARDIOLOGY CLINIC | Age: 72
End: 2022-06-07
Payer: COMMERCIAL

## 2022-06-07 VITALS
OXYGEN SATURATION: 95 % | WEIGHT: 219 LBS | BODY MASS INDEX: 36.49 KG/M2 | HEIGHT: 65 IN | DIASTOLIC BLOOD PRESSURE: 70 MMHG | HEART RATE: 69 BPM | SYSTOLIC BLOOD PRESSURE: 146 MMHG

## 2022-06-07 DIAGNOSIS — I44.2 COMPLETE HEART BLOCK (HCC): ICD-10-CM

## 2022-06-07 DIAGNOSIS — I44.7 LBBB (LEFT BUNDLE BRANCH BLOCK): ICD-10-CM

## 2022-06-07 DIAGNOSIS — I44.2 COMPLETE HEART BLOCK (HCC): Primary | ICD-10-CM

## 2022-06-07 DIAGNOSIS — Z95.0 PACEMAKER: ICD-10-CM

## 2022-06-07 DIAGNOSIS — I10 ESSENTIAL HYPERTENSION: ICD-10-CM

## 2022-06-07 PROCEDURE — 93280 PM DEVICE PROGR EVAL DUAL: CPT | Performed by: INTERNAL MEDICINE

## 2022-06-07 PROCEDURE — 99214 OFFICE O/P EST MOD 30 MIN: CPT | Performed by: NURSE PRACTITIONER

## 2022-06-07 PROCEDURE — 1123F ACP DISCUSS/DSCN MKR DOCD: CPT | Performed by: NURSE PRACTITIONER

## 2022-06-07 NOTE — PATIENT INSTRUCTIONS
No changes today    Remote device transmissions every three months     Monitor BP at home and call if consistently out of goal ranges    Follow up in 6 months

## 2022-06-07 NOTE — PROGRESS NOTES
Patient presents to the device clinic today for a programming evaluation for his pacemaker. Patient has a history of CHB. Last device interrogation was on 4/12. Since then, 1 NSVT event recorded x 6 beats. P wave: 1.9 mV  R wave: 2.1 mV    AP 5.2%  98.2%    All sensing and pacing parameters are within normal range. Increased RV output to keep 2:1 safety margin - LOC today 1.75V @ 0.5ms. Patient will see BRITTANY Barnes in office today. Patient education was provided about device functionality, in home monitoring, and any other patient questions and/or concerns were addressed. Patient voices understanding. Patient will follow up in 3 months in office or remotely. Please see interrogation for more detail - Paceart report located under the Cardiology tab.

## 2022-06-07 NOTE — PROGRESS NOTES
[facility]   Electrophysiology Outpatient Note              Date:  June 7, 2022  Patient name: Milan Gutierrez  YOB: 1950    Primary Care physician: Toni Zhang MD    HISTORY OF PRESENT ILLNESS: The patient is a 66 y.o.  male with a history of CHB, HTN, HLD, LBBB, MR and AI. In 4/2019, he was admitted with dizziness. He developed a 2-1 AV block and CHB with alternating bundle branch block. He underwent implantation of a dual-chamber pacemaker with a His bundle lead. Echo showed an EF of 55 to 60%. In 8/2020, he presented to the ED with complaints of bradycardia and dizziness. Device interrogation at this time showed an increased His lead threshold with intermittent capture and output was increased to 5V at 1 ms. In 8/2020, his RV output was changed to 2.5V at 1 ms unipolar. Today he is being seen for CHB. EKG shows AS  with a HR of 64. Patient has no complaints. He checks his BP at home. Average SBP is in the 130's. Denies chest pain, palpitations, shortness of breath, and dizziness. Device check today shows:   Brand: [employer]   Mode: DDD  Normal function   5.2% AP  98.2%     Arrhythmias: NSVT x 6 beats   Battery life 2.7 years  RA impedance 494 ohms   RV impedance 285 ohms   RA threshold 0.750 V @ 0.40 ms  RV threshold off (unipolar)   RA sensitivity 0.30 mV  RV sensitivity 0.45 mV     RV output increased to keep 2:1           Past Medical History:   has a past medical history of Anxiety, Hyperlipidemia, Hypertension, Hypertrophy of prostate without urinary obstruction, Mild mitral regurgitation, and Moderate aortic insufficiency. Past Surgical History:   has a past surgical history that includes Colonoscopy (4/21/2008); vitrectomy (Left, 2018); Cataract removal with implant (Left, 05/01/2019); Intracapsular cataract extraction (Left, 5/1/2019);  Cardiac pacemaker placement (04/19/2019); pacemaker placement (2019); skin biopsy (Left, 3/12/2021); and Skin graft (Left, 3/12/2021). Home Medications:    Prior to Admission medications    Medication Sig Start Date End Date Taking? Authorizing Provider   telmisartan-hydrochlorothiazide (MICARDIS HCT) 80-25 MG per tablet TAKE ONE TABLET DAILY 21  Yes Devendra Parra MD   pravastatin (PRAVACHOL) 80 MG tablet TAKE ONE TABLET DAILY AT BEDTIME 21  Yes Devendra Parra MD   tamsulosin (FLOMAX) 0.4 MG capsule Take 1 capsule by mouth daily for 5 days  Patient not taking: Reported on 2022 10/4/21 10/9/21  Amada Pierce MD   venlafaxine (EFFEXOR XR) 75 MG extended release capsule TAKE ONE CAPSULE BY MOUTH DAILY  Patient not taking: Reported on 10/4/2021 9/22/21   Devendra Parra MD   buPROPion (WELLBUTRIN XL) 150 MG extended release tablet Take 1 tablet by mouth every morning  Patient not taking: Reported on 2022   Devendra Parra MD   escitalopram (LEXAPRO) 10 MG tablet Take 1 tablet by mouth daily  Patient not taking: Reported on 2021   Devendra Parra MD       Allergies:  Patient has no known allergies. Social History:   reports that he has never smoked. He has never used smokeless tobacco. He reports previous alcohol use. He reports that he does not use drugs. Family History: family history includes Cancer in his father; Cancer (age of onset: 62) in his brother; No Known Problems in his sister; Other in his mother. All 14 point review of systems are completed and pertinent positives are mentioned in the history of present illness. Other systems are reviewed and are negative. PHYSICAL EXAM:    Vital signs:    BP (!) 146/70   Pulse 69   Ht 5' 5\" (1.651 m)   Wt 219 lb (99.3 kg)   SpO2 95%   BMI 36.44 kg/m²      Constitutional and general appearance: alert, cooperative, no distress and appears stated age  HEENT: PERRL, no cervical lymphadenopathy. No masses palpable.  Normal oral mucosa  Respiratory:  · Normal excursion and expansion without use of accessory muscles  · Resp auscultation: Normal breath sounds without wheezing, rhonchi, and rales  Cardiovascular:  · The apical impulse is not displaced  · Heart tones are crisp and normal. regular S1 and S2.  · Jugular venous pulsation Normal  · The carotid upstroke is normal in amplitude and contour without delay or bruit  · Peripheral pulses are symmetrical and full   Abdomen:  · No masses or tenderness  · Bowel sounds present  Extremities:  ·  No cyanosis or clubbing  ·  No lower extremity edema  ·  Skin: warm and dry  Neurological:  · Alert and oriented  · Moves all extremities well  · No abnormalities of mood, affect, memory, mentation, or behavior are noted    DATA:    ECG 6/7/2022:  AS  64 bpm     Echo 4/2019:   Summary   Normal left ventricular systolic function with ejection fraction of 55-60%. The study is inadequate to exclude regional wall motion abnormalities. Systolic pulmonic artery pressure (SPAP) is normal estimated at 24 mmHg   (Right atrial pressure of 3 mmHg). Compared to previous study from 12- no changes noted. Echo 12/2017:     Summary   Normal left ventricle systolic function with an estimated ejection fraction   of 55%. No regional wall motion abnormalities are seen. Diastolic filling parameters suggest normal diastolic filing pressure. Mild mitral regurgitation. Individual aortic valve leaflets are not clearly visualized, aortic valve   appears sclerotic. Moderate aortic regurgitation. Systolic pulmonary artery pressure (SPAP) is normal and estimated at 15 mmHg   (RA pressure of 3 mmHg). All labs and testing reviewed. CARDIOLOGY LABS:   CBC: No results for input(s): WBC, HGB, HCT, PLT in the last 72 hours. BMP: No results for input(s): NA, K, CO2, BUN, CREATININE, LABGLOM, GLUCOSE in the last 72 hours. PT/INR: No results for input(s): PROTIME, INR in the last 72 hours. APTT:No results for input(s):  APTT in the last 72 hours. FASTING LIPID PANEL:  Lab Results   Component Value Date    HDL 54 05/16/2022    LDLCALC 82 05/16/2022    TRIG 132 05/16/2022     LIVER PROFILE:No results for input(s): AST, ALT, ALB in the last 72 hours.     IMPRESSION:    Patient Active Problem List   Diagnosis    Hypertension    Hyperlipidemia    Hypertrophy of prostate without urinary obstruction    Anxiety    Moderate aortic insufficiency    Mild mitral regurgitation    Pacemaker    LBBB (left bundle branch block)    Complete heart block ([address]    Skin lesion of left external ear       Assessment:   Complete heart block: stable    -s/p chamber pacemaker with His bundle lead 4/2019   -device check per HPI   HTN: controlled   HLD  LBBB  Moderate AI  Mild MR   Anxiety     Plan:   No changes today  Remote device transmissions every three months   Monitor BP at home and call if consistently out of goal ranges  Follow up in 6 months    MDM moderate     DANIEL Santacruz-CNP  [facility] [address]  [phone]

## 2022-07-12 ENCOUNTER — NURSE ONLY (OUTPATIENT)
Dept: CARDIOLOGY CLINIC | Age: 72
End: 2022-07-12
Payer: COMMERCIAL

## 2022-07-12 DIAGNOSIS — Z95.0 PACEMAKER: ICD-10-CM

## 2022-07-12 DIAGNOSIS — I44.2 COMPLETE HEART BLOCK (HCC): ICD-10-CM

## 2022-07-12 PROCEDURE — 93294 REM INTERROG EVL PM/LDLS PM: CPT | Performed by: INTERNAL MEDICINE

## 2022-07-12 PROCEDURE — 93296 REM INTERROG EVL PM/IDS: CPT | Performed by: INTERNAL MEDICINE

## 2022-07-13 NOTE — PROGRESS NOTES
We received remote transmission from patient's monitor at home. Transmission shows normal sensing and pacing function. EP physician will review. See interrogation under cardiology tab in the [address] [facility] [address] field for more details. No arrhythmias to report. End of 91-day monitoring period 7-12-22.

## 2022-07-18 RX ORDER — TELMISARTAN AND HYDROCHLORTHIAZIDE 80; 25 MG/1; MG/1
TABLET ORAL
Qty: 90 TABLET | Refills: 0 | Status: SHIPPED | OUTPATIENT
Start: 2022-07-18 | End: 2022-09-20 | Stop reason: SDUPTHER

## 2022-09-20 ENCOUNTER — OFFICE VISIT (OUTPATIENT)
Dept: INTERNAL MEDICINE CLINIC | Age: 72
End: 2022-09-20

## 2022-09-20 VITALS
WEIGHT: 219 LBS | HEIGHT: 65 IN | HEART RATE: 70 BPM | BODY MASS INDEX: 36.49 KG/M2 | RESPIRATION RATE: 12 BRPM | SYSTOLIC BLOOD PRESSURE: 115 MMHG | DIASTOLIC BLOOD PRESSURE: 71 MMHG

## 2022-09-20 DIAGNOSIS — I44.2 COMPLETE HEART BLOCK (HCC): ICD-10-CM

## 2022-09-20 DIAGNOSIS — E78.5 HYPERLIPIDEMIA, UNSPECIFIED HYPERLIPIDEMIA TYPE: ICD-10-CM

## 2022-09-20 DIAGNOSIS — F41.9 ANXIETY: ICD-10-CM

## 2022-09-20 DIAGNOSIS — N40.0 HYPERTROPHY OF PROSTATE WITHOUT URINARY OBSTRUCTION: ICD-10-CM

## 2022-09-20 DIAGNOSIS — Z00.00 ROUTINE GENERAL MEDICAL EXAMINATION AT A HEALTH CARE FACILITY: Primary | ICD-10-CM

## 2022-09-20 PROCEDURE — 90662 IIV NO PRSV INCREASED AG IM: CPT | Performed by: INTERNAL MEDICINE

## 2022-09-20 PROCEDURE — 90472 IMMUNIZATION ADMIN EACH ADD: CPT | Performed by: INTERNAL MEDICINE

## 2022-09-20 PROCEDURE — 90471 IMMUNIZATION ADMIN: CPT | Performed by: INTERNAL MEDICINE

## 2022-09-20 PROCEDURE — 99397 PER PM REEVAL EST PAT 65+ YR: CPT | Performed by: INTERNAL MEDICINE

## 2022-09-20 PROCEDURE — 90750 HZV VACC RECOMBINANT IM: CPT | Performed by: INTERNAL MEDICINE

## 2022-09-20 RX ORDER — TELMISARTAN AND HYDROCHLORTHIAZIDE 80; 25 MG/1; MG/1
TABLET ORAL
Qty: 90 TABLET | Refills: 3 | Status: SHIPPED | OUTPATIENT
Start: 2022-09-20

## 2022-09-20 RX ORDER — VENLAFAXINE HYDROCHLORIDE 75 MG/1
CAPSULE, EXTENDED RELEASE ORAL
Qty: 90 CAPSULE | Refills: 3 | Status: SHIPPED | OUTPATIENT
Start: 2022-09-20

## 2022-09-20 RX ORDER — BUPROPION HYDROCHLORIDE 150 MG/1
150 TABLET ORAL EVERY MORNING
Qty: 90 TABLET | Refills: 3 | Status: CANCELLED | OUTPATIENT
Start: 2022-09-20

## 2022-09-20 RX ORDER — PRAVASTATIN SODIUM 80 MG/1
TABLET ORAL
Qty: 90 TABLET | Refills: 3 | Status: SHIPPED | OUTPATIENT
Start: 2022-09-20

## 2022-09-20 RX ORDER — ESCITALOPRAM OXALATE 10 MG/1
10 TABLET ORAL DAILY
Qty: 90 TABLET | Refills: 3 | Status: CANCELLED | OUTPATIENT
Start: 2022-09-20

## 2022-09-20 ASSESSMENT — ENCOUNTER SYMPTOMS
WHEEZING: 0
BACK PAIN: 0
NAUSEA: 0
VOMITING: 0
SHORTNESS OF BREATH: 0
RHINORRHEA: 0
ABDOMINAL PAIN: 0
COUGH: 0

## 2022-09-20 NOTE — PROGRESS NOTES
Subjective:      Patient ID: Ceasar Givens is a 67 y.o. . HPI     Patient is here for an annual physical.  Patient is here for follow up of hypertension and hyperlipidemia. Patient's BP is controlled on current medications. No chest pain or dyspnea. He has been checking it at home. It is normal.  His cholesterol is at goal. No myalgia. His anxiety is controlled. He takes medication. He has moderate AI and mild MR. No changes. His vision is stable. He has had a prior vitrectomy. He has a family history of prostate cancer. He has a pacemaker. It is working ok. He has history of complete heart block. Review of Systems   Constitutional:  Negative for activity change and appetite change. HENT:  Negative for postnasal drip and rhinorrhea. Respiratory:  Negative for cough, shortness of breath and wheezing. Cardiovascular:  Negative for chest pain, palpitations and leg swelling. Gastrointestinal:  Negative for abdominal pain, nausea and vomiting. Genitourinary:  Negative for difficulty urinating and frequency. Musculoskeletal:  Negative for back pain and joint swelling. Skin:  Negative for rash. Neurological:  Negative for light-headedness. Psychiatric/Behavioral:  Negative for sleep disturbance. Current Outpatient Medications   Medication Sig Dispense Refill    pravastatin (PRAVACHOL) 80 MG tablet TAKE ONE TABLET DAILY AT BEDTIME 90 tablet 3    venlafaxine (EFFEXOR XR) 75 MG extended release capsule TAKE ONE CAPSULE BY MOUTH DAILY 90 capsule 3    telmisartan-hydroCHLOROthiazide (MICARDIS HCT) 80-25 MG per tablet TAKE ONE TABLET BY MOUTH DAILY 90 tablet 3     No current facility-administered medications for this visit.       Past Medical History:   Diagnosis Date    Anxiety 12/21/2012    Hyperlipidemia     Hypertension     Hypertrophy of prostate without urinary obstruction     Mild mitral regurgitation 3/6/2018    Moderate aortic insufficiency 3/6/2018       Past Surgical History:   Procedure Laterality Date    CARDIAC PACEMAKER PLACEMENT  04/19/2019    CATARACT REMOVAL WITH IMPLANT Left 05/01/2019    COLONOSCOPY  4/21/2008    INTRACAPSULAR CATARACT EXTRACTION Left 5/1/2019    PHACO EMULSIFICATION OF CATARACT WITH  INTRAOCULAR LENS IMPLANT performed by Rylee Bae MD at [address]  2019    SKIN BIOPSY Left 3/12/2021    WIDE LOCAL EXCISION OF LEFT EAR LESION WITH FROZEN SECTION, performed by Lotus Scott MD at [address] Left 3/12/2021    FULL THICKNESS SKIN GRAFT performed by Lotus Scott MD at [address] Left 2018       Family History   Problem Relation Age of Onset    Cancer Brother 62        prostate cancer    Other Mother         Lung fibrosis    Cancer Father         Prostate cancer    No Known Problems Sister        Social History     Tobacco Use    Smoking status: Never    Smokeless tobacco: Never   Vaping Use    Vaping Use: Never used   Substance Use Topics    Alcohol use: Not Currently     Comment: rarely    Drug use: No           /71 (Site: Right Upper Arm, Position: Sitting, Cuff Size: Large Adult)   Pulse 70   Resp 12   Ht 5' 5\" (1.651 m)   Wt 219 lb (99.3 kg)   BMI 36.44 kg/m²      Objective:   Physical Exam  Constitutional:       Appearance: He is well-developed. HENT:      Head: Normocephalic and atraumatic. Eyes:      General: No scleral icterus. Conjunctiva/sclera: Conjunctivae normal.      Pupils: Pupils are equal, round, and reactive to light. Neck:      Thyroid: No thyromegaly. Cardiovascular:      Rate and Rhythm: Normal rate and regular rhythm. Heart sounds: No murmur heard. Pulmonary:      Effort: Pulmonary effort is normal.      Breath sounds: No decreased breath sounds or wheezing. Abdominal:      Palpations: Abdomen is soft. There is no mass. Tenderness: There is no abdominal tenderness. There is no rebound.    Musculoskeletal:      Cervical back: Normal range of motion and neck supple. Lymphadenopathy:      Cervical: No cervical adenopathy. ECHO DONE ON 12/20/2017  Conclusions      Summary   Normal left ventricle systolic function with an estimated ejection fraction   of 55%. No regional wall motion abnormalities are seen. Diastolic filling parameters suggest normal diastolic filing pressure. Mild mitral regurgitation. Individual aortic valve leaflets are not clearly visualized, aortic valve   appears sclerotic. Moderate aortic regurgitation. Systolic pulmonary artery pressure (SPAP) is normal and estimated at 15 mmHg   (RA pressure of 3 mmHg). Assessment:          Diagnosis Orders   1. Routine general medical examination at a health care facility        2. Anxiety        3. Complete heart block ([address]        4. Hyperlipidemia, unspecified hyperlipidemia type        5. Hypertrophy of prostate without urinary obstruction                  Plan:      Decrease calorie intake. Annual exam.  Hypertension:  Well controlled. Hyperlipidemia:  At goal.   Anxiety: well controlled on Effexor XR. Mild MR and Moderate AI stable. AI not seen on last ECHO. CHB has pacemaker. BPH stable. He has a FH of prostate cancer. He has been asked to see Urology. Decrease calorie intake. Exercise,weight loss recommended. The current medical regimen is effective;  continue present plan and medications. See orders.

## 2022-10-11 ENCOUNTER — NURSE ONLY (OUTPATIENT)
Dept: CARDIOLOGY CLINIC | Age: 72
End: 2022-10-11
Payer: COMMERCIAL

## 2022-10-11 DIAGNOSIS — I44.2 COMPLETE HEART BLOCK (HCC): Primary | ICD-10-CM

## 2022-10-11 DIAGNOSIS — Z95.0 PACEMAKER: ICD-10-CM

## 2022-10-11 PROCEDURE — 93294 REM INTERROG EVL PM/LDLS PM: CPT | Performed by: INTERNAL MEDICINE

## 2022-10-11 PROCEDURE — 93296 REM INTERROG EVL PM/IDS: CPT | Performed by: INTERNAL MEDICINE

## 2022-10-17 NOTE — PROGRESS NOTES
Remote transmission received for patient's dual chamber PACEMAKER. Transmission shows normal sensing and pacing function. EP physician will review. See interrogation under the cardiology tab in the [address] [place] [address] [address] field for more details. Will continue to monitor remotely. Hx FFTWOS. Pacing (% of Time Since 12-Jul-2022)   99.4% (MVP Off)  AP 11.0%. NSVT noted (not on a beta blocker)      Dual chamber PPM 4/19/19 selective HIS capture < 2.75v. His pacing threshold in bipolar mode is 1.5 V at 1 ms. Since the pacing threshold in unipolar mode was lower, we reprogrammed him to unipolar pacing bipolar sensing. With the output programmed to 2.5 V, he has selective His bundle pacing with right bundle branch block. This change increased the battery life. (End of 91-day monitoring period 10/11/22).

## 2022-10-20 PROBLEM — Z00.00 ROUTINE GENERAL MEDICAL EXAMINATION AT A HEALTH CARE FACILITY: Status: RESOLVED | Noted: 2022-09-20 | Resolved: 2022-10-20

## 2022-11-28 NOTE — PROGRESS NOTES
[facility]   Electrophysiology Outpatient Note              Date:  December 7, 2022  Patient name: Stevan Rush  YOB: 1950    Primary Care physician: [facility], [place]    HISTORY OF PRESENT ILLNESS: The patient is a 67 y.o.  male with a history of CHB, HTN, HLD, LBBB, MR and AI. In 4/2019, he was admitted with dizziness. He developed a 2-1 AV block and CHB with alternating bundle branch block. He underwent implantation of a dual-chamber pacemaker with a His bundle lead. Echo showed an EF of 55 to 60%. In 8/2020, he presented to the ED with complaints of bradycardia and dizziness. Device interrogation at this time showed an increased His lead threshold with intermittent capture and output was increased to 5V at 1 ms. In 8/2020, his RV output was changed to 2.5V at 1 ms unipolar. Today he is being seen for CHB. EKG shows AS  with a HR of 64. Patient has no complaints. He complains of recent progressive shortness of breath with activity. He also reports sleeping in chair to due nasal congestion. Denies chest pain, palpitations or dizziness. Device check today shows:   Brand: [employer]   Mode: DDD  Normal function   9% AP  99.3%     Arrhythmias: none  Battery life 1.6 years  RA impedance 437 ohms   RV impedance 304 ohms   RA threshold 0.750 V @ 0.40 ms  RV threshold off (unipolar)   RA sensitivity 0.30 mV  RV sensitivity 0.45 mV     RV output increased to from 3.5 V to 4.5 V @ 0.5 ms         Past Medical History:   has a past medical history of Anxiety, Hyperlipidemia, Hypertension, Hypertrophy of prostate without urinary obstruction, Mild mitral regurgitation, and Moderate aortic insufficiency. Past Surgical History:   has a past surgical history that includes Colonoscopy (4/21/2008); vitrectomy (Left, 2018); Cataract removal with implant (Left, 05/01/2019); Intracapsular cataract extraction (Left, 5/1/2019);  Cardiac pacemaker placement (04/19/2019); pacemaker placement (2019); skin biopsy (Left, 3/12/2021); and Skin graft (Left, 3/12/2021). Home Medications:    Prior to Admission medications    Medication Sig Start Date End Date Taking? Authorizing Provider   pravastatin (PRAVACHOL) 80 MG tablet TAKE ONE TABLET DAILY AT BEDTIME 9/20/22  Yes Kreg Seip, MD   venlafaxine (EFFEXOR XR) 75 MG extended release capsule TAKE ONE CAPSULE BY MOUTH DAILY 9/20/22  Yes Kreg Seip, MD   telmisartan-hydroCHLOROthiazide (MICARDIS HCT) 80-25 MG per tablet TAKE ONE TABLET BY MOUTH DAILY 9/20/22  Yes Kreg Seip, MD       Allergies:  Patient has no known allergies. Social History:   reports that he has never smoked. He has never used smokeless tobacco. He reports that he does not currently use alcohol. He reports that he does not use drugs. Family History: family history includes Cancer in his father; Cancer (age of onset: 62) in his brother; No Known Problems in his sister; Other in his mother. All 14 point review of systems are completed and pertinent positives are mentioned in the history of present illness. Other systems are reviewed and are negative. PHYSICAL EXAM:    Vital signs:    /80   Pulse 68   Ht 5' 5\" (1.651 m)   Wt 218 lb 6.4 oz (99.1 kg)   SpO2 95%   BMI 36.34 kg/m²      Constitutional and general appearance: alert, cooperative, no distress and appears stated age  HEENT: PERRL, no cervical lymphadenopathy. No masses palpable. Normal oral mucosa  Respiratory:  Normal excursion and expansion without use of accessory muscles  Resp auscultation: Normal breath sounds without wheezing, rhonchi, and rales  Cardiovascular:   The apical impulse is not displaced  Heart tones are crisp and normal. regular S1 and S2.  Jugular venous pulsation Normal  The carotid upstroke is normal in amplitude and contour without delay or bruit  Peripheral pulses are symmetrical and full   Abdomen:  No masses or tenderness  Bowel sounds present  Extremities:   No cyanosis or clubbing   No lower extremity edema   Skin: warm and dry  Neurological:  Alert and oriented  Moves all extremities well  No abnormalities of mood, affect, memory, mentation, or behavior are noted    DATA:    ECG 6/7/2022:  AS  64 bpm     Echo 4/2019:   Summary   Normal left ventricular systolic function with ejection fraction of 55-60%. The study is inadequate to exclude regional wall motion abnormalities. Systolic pulmonic artery pressure (SPAP) is normal estimated at 24 mmHg   (Right atrial pressure of 3 mmHg). Compared to previous study from 12- no changes noted. Echo 12/2017:     Summary   Normal left ventricle systolic function with an estimated ejection fraction   of 55%. No regional wall motion abnormalities are seen. Diastolic filling parameters suggest normal diastolic filing pressure. Mild mitral regurgitation. Individual aortic valve leaflets are not clearly visualized, aortic valve   appears sclerotic. Moderate aortic regurgitation. Systolic pulmonary artery pressure (SPAP) is normal and estimated at 15 mmHg   (RA pressure of 3 mmHg). All labs and testing reviewed. CARDIOLOGY LABS:   CBC: No results for input(s): WBC, HGB, HCT, PLT in the last 72 hours. BMP: No results for input(s): NA, K, CO2, BUN, CREATININE, LABGLOM, GLUCOSE in the last 72 hours. PT/INR: No results for input(s): PROTIME, INR in the last 72 hours. APTT:No results for input(s): APTT in the last 72 hours. FASTING LIPID PANEL:  Lab Results   Component Value Date/Time    HDL 54 05/16/2022 12:06 PM    LDLCALC 82 05/16/2022 12:06 PM    TRIG 132 05/16/2022 12:06 PM     LIVER PROFILE:No results for input(s): AST, ALT, ALB in the last 72 hours.     IMPRESSION:    Patient Active Problem List   Diagnosis    Hypertension    Hyperlipidemia    Hypertrophy of prostate without urinary obstruction    Anxiety    Moderate aortic insufficiency    Mild

## 2022-12-07 ENCOUNTER — NURSE ONLY (OUTPATIENT)
Dept: CARDIOLOGY CLINIC | Age: 72
End: 2022-12-07
Payer: COMMERCIAL

## 2022-12-07 ENCOUNTER — OFFICE VISIT (OUTPATIENT)
Dept: CARDIOLOGY CLINIC | Age: 72
End: 2022-12-07
Payer: COMMERCIAL

## 2022-12-07 VITALS
WEIGHT: 218.4 LBS | BODY MASS INDEX: 36.39 KG/M2 | HEART RATE: 68 BPM | DIASTOLIC BLOOD PRESSURE: 80 MMHG | HEIGHT: 65 IN | SYSTOLIC BLOOD PRESSURE: 126 MMHG | OXYGEN SATURATION: 95 %

## 2022-12-07 DIAGNOSIS — R06.02 SHORTNESS OF BREATH: ICD-10-CM

## 2022-12-07 DIAGNOSIS — I44.2 COMPLETE HEART BLOCK (HCC): Primary | ICD-10-CM

## 2022-12-07 DIAGNOSIS — Z95.0 PACEMAKER: ICD-10-CM

## 2022-12-07 DIAGNOSIS — I10 ESSENTIAL HYPERTENSION: ICD-10-CM

## 2022-12-07 PROCEDURE — 3074F SYST BP LT 130 MM HG: CPT | Performed by: NURSE PRACTITIONER

## 2022-12-07 PROCEDURE — 3078F DIAST BP <80 MM HG: CPT | Performed by: NURSE PRACTITIONER

## 2022-12-07 PROCEDURE — 1123F ACP DISCUSS/DSCN MKR DOCD: CPT | Performed by: NURSE PRACTITIONER

## 2022-12-07 PROCEDURE — 99214 OFFICE O/P EST MOD 30 MIN: CPT | Performed by: NURSE PRACTITIONER

## 2022-12-07 PROCEDURE — 93280 PM DEVICE PROGR EVAL DUAL: CPT | Performed by: INTERNAL MEDICINE

## 2022-12-07 NOTE — PATIENT INSTRUCTIONS
NO changes today     Remote device transmissions every three months     Update echocardiogram given shortness of breath. Call [phone] to schedule.      Follow up with me in 6 months

## 2022-12-07 NOTE — PROGRESS NOTES
Patient presents to the device clinic today for a programming evaluation for his pacemaker. Patient has a history of CHB. Takes 10/11. Last device interrogation was on 10/11. Since then, no arrhythmias recorded. Elevated RV threshold noted. LOC 2.25V @ 0.5ms / 1.75V @ 1.0ms. P wave: 1.0 mV  R wave: N/A    AP 9.0%  99.3%    All sensing and pacing parameters are within normal range. Increased RV output to keep 2:1 safety margin - 4.5V @ 0.5ms. Patient will see BRITTANY Doty in office today. Patient education was provided about device functionality, in home monitoring, and any other patient questions and/or concerns were addressed. Patient voices understanding. Patient will follow up in 3 months in office or remotely. Please see interrogation for more detail - Paceart report located under the Cardiology tab.

## 2023-01-30 ENCOUNTER — OFFICE VISIT (OUTPATIENT)
Dept: INTERNAL MEDICINE CLINIC | Age: 73
End: 2023-01-30

## 2023-01-30 ENCOUNTER — HOSPITAL ENCOUNTER (OUTPATIENT)
Dept: NON INVASIVE DIAGNOSTICS | Age: 73
Discharge: HOME | End: 2023-01-30
Payer: COMMERCIAL

## 2023-01-30 VITALS
DIASTOLIC BLOOD PRESSURE: 80 MMHG | HEIGHT: 65 IN | HEART RATE: 70 BPM | SYSTOLIC BLOOD PRESSURE: 130 MMHG | WEIGHT: 213 LBS | BODY MASS INDEX: 35.49 KG/M2 | RESPIRATION RATE: 12 BRPM

## 2023-01-30 DIAGNOSIS — E78.5 HYPERLIPIDEMIA, UNSPECIFIED HYPERLIPIDEMIA TYPE: ICD-10-CM

## 2023-01-30 DIAGNOSIS — I10 PRIMARY HYPERTENSION: ICD-10-CM

## 2023-01-30 DIAGNOSIS — I34.0 MILD MITRAL REGURGITATION: ICD-10-CM

## 2023-01-30 DIAGNOSIS — I35.1 MODERATE AORTIC INSUFFICIENCY: ICD-10-CM

## 2023-01-30 DIAGNOSIS — N40.0 HYPERTROPHY OF PROSTATE WITHOUT URINARY OBSTRUCTION: ICD-10-CM

## 2023-01-30 DIAGNOSIS — F41.9 ANXIETY: ICD-10-CM

## 2023-01-30 DIAGNOSIS — Z95.0 PACEMAKER: ICD-10-CM

## 2023-01-30 DIAGNOSIS — I44.2 COMPLETE HEART BLOCK (HCC): ICD-10-CM

## 2023-01-30 DIAGNOSIS — I10 PRIMARY HYPERTENSION: Primary | ICD-10-CM

## 2023-01-30 LAB
LV EF: 58 %
LVEF MODALITY: NORMAL

## 2023-01-30 PROCEDURE — 3075F SYST BP GE 130 - 139MM HG: CPT | Performed by: INTERNAL MEDICINE

## 2023-01-30 PROCEDURE — 3079F DIAST BP 80-89 MM HG: CPT | Performed by: INTERNAL MEDICINE

## 2023-01-30 PROCEDURE — 93306 TTE W/DOPPLER COMPLETE: CPT

## 2023-01-30 PROCEDURE — 99214 OFFICE O/P EST MOD 30 MIN: CPT | Performed by: INTERNAL MEDICINE

## 2023-01-30 PROCEDURE — 1123F ACP DISCUSS/DSCN MKR DOCD: CPT | Performed by: INTERNAL MEDICINE

## 2023-01-30 ASSESSMENT — PATIENT HEALTH QUESTIONNAIRE - PHQ9
SUM OF ALL RESPONSES TO PHQ QUESTIONS 1-9: 0
SUM OF ALL RESPONSES TO PHQ QUESTIONS 1-9: 0
SUM OF ALL RESPONSES TO PHQ9 QUESTIONS 1 & 2: 0
SUM OF ALL RESPONSES TO PHQ QUESTIONS 1-9: 0
1. LITTLE INTEREST OR PLEASURE IN DOING THINGS: 0
2. FEELING DOWN, DEPRESSED OR HOPELESS: 0
SUM OF ALL RESPONSES TO PHQ QUESTIONS 1-9: 0

## 2023-01-30 ASSESSMENT — ENCOUNTER SYMPTOMS
RHINORRHEA: 0
COUGH: 0
NAUSEA: 0
BACK PAIN: 0
SHORTNESS OF BREATH: 0
ABDOMINAL PAIN: 0
WHEEZING: 0
VOMITING: 0

## 2023-01-30 NOTE — PROGRESS NOTES
Subjective:      Patient ID: Godwin Mckeon is a 67 y.o. . HPI  Patient is here for follow up of hypertension and hyperlipidemia. Patient's BP is controlled on current medications. No chest pain. He had some dyspnea. He saw the cardiology NP and ECHO was ordered. ECHO was done today. His cholesterol is at goal. No myalgia. He has lost another 6 lbs   His anxiety is controlled. He had a complete heart block and has a pacemaker. He has moderate AI and mild MR. No changes. His vision is stable. He has had a prior vitrectomy. He had a secondary cataract of the left eye and had laser done. It helped. Review of Systems   Constitutional:  Negative for activity change and appetite change. HENT:  Negative for postnasal drip and rhinorrhea. Respiratory:  Negative for cough, shortness of breath and wheezing. Cardiovascular:  Negative for chest pain, palpitations and leg swelling. Gastrointestinal:  Negative for abdominal pain, nausea and vomiting. Genitourinary:  Negative for difficulty urinating and frequency. Musculoskeletal:  Negative for back pain and joint swelling. Skin:  Negative for rash. Neurological:  Negative for light-headedness. Psychiatric/Behavioral:  Negative for sleep disturbance. Current Outpatient Medications   Medication Instructions    pravastatin (PRAVACHOL) 80 MG tablet TAKE ONE TABLET DAILY AT BEDTIME    telmisartan-hydroCHLOROthiazide (MICARDIS HCT) 80-25 MG per tablet TAKE ONE TABLET BY MOUTH DAILY    venlafaxine (EFFEXOR XR) 75 MG extended release capsule TAKE ONE CAPSULE BY MOUTH DAILY          There are no changes to past medical history, family history, social history or review of systems(except as noted in the history section) since prior note (all reviewed with patient).     /80 (Site: Right Upper Arm, Position: Sitting, Cuff Size: Large Adult)   Pulse 70   Resp 12   Ht 5' 5\" (1.651 m)   Wt 213 lb (96.6 kg)   BMI 35.45 kg/m²      Vitals: 01/30/23 1400   BP: 130/80   Site: Right Upper Arm   Position: Sitting   Cuff Size: Large Adult   Pulse: 70   Resp: 12   Weight: 213 lb (96.6 kg)   Height: 5' 5\" (1.651 m)        Objective:   Physical Exam  Constitutional:       Appearance: He is well-developed. HENT:      Head: Normocephalic and atraumatic. Eyes:      General: No scleral icterus. Conjunctiva/sclera: Conjunctivae normal.      Pupils: Pupils are equal, round, and reactive to light. Neck:      Thyroid: No thyromegaly. Cardiovascular:      Rate and Rhythm: Normal rate and regular rhythm. Heart sounds: Murmur heard. Pulmonary:      Effort: Pulmonary effort is normal.      Breath sounds: No decreased breath sounds or wheezing. Abdominal:      Palpations: Abdomen is soft. There is no mass. Tenderness: There is no abdominal tenderness. There is no rebound. Musculoskeletal:      Cervical back: Normal range of motion and neck supple. Lymphadenopathy:      Cervical: No cervical adenopathy. ECHO DONE ON 12/20/2017  Conclusions      Summary   Normal left ventricle systolic function with an estimated ejection fraction   of 55%. No regional wall motion abnormalities are seen. Diastolic filling parameters suggest normal diastolic filing pressure. Mild mitral regurgitation. Individual aortic valve leaflets are not clearly visualized, aortic valve   appears sclerotic. Moderate aortic regurgitation. Systolic pulmonary artery pressure (SPAP) is normal and estimated at 15 mmHg   (RA pressure of 3 mmHg). Assessment:          Diagnosis Orders   1. Primary hypertension  Comprehensive Metabolic Panel    CBC with Auto Differential    Uric Acid      2. Hyperlipidemia, unspecified hyperlipidemia type        3. Moderate aortic insufficiency        4. Mild mitral regurgitation        5. Pacemaker        6. Hypertrophy of prostate without urinary obstruction        7. Anxiety        8.  Complete heart block ([address] Plan:      Decrease calorie intake. Hypertension:  Well controlled. Monitor at home. Weight loss recommended. Hyperlipidemia:  At goal.   Anxiety: well controlled on Effexor XR and Wellbutrin XL. Mild MR and Moderate AI stable. ECHO done. Results pending. Pacemaker working well. Decrease calorie intake. Exercise,weight loss recommended. The current medical regimen is effective;  continue present plan and medications. See orders.

## 2023-01-31 LAB
A/G RATIO: 1.6 (ref 1.1–2.2)
ALBUMIN SERPL-MCNC: 4.2 G/DL (ref 3.4–5)
ALP BLD-CCNC: 63 U/L (ref 40–129)
ALT SERPL-CCNC: 21 U/L (ref 10–40)
ANION GAP SERPL CALCULATED.3IONS-SCNC: 12 MMOL/L (ref 3–16)
AST SERPL-CCNC: 26 U/L (ref 15–37)
BASOPHILS ABSOLUTE: 0 K/UL (ref 0–0.2)
BASOPHILS RELATIVE PERCENT: 0 %
BILIRUB SERPL-MCNC: 0.5 MG/DL (ref 0–1)
BUN BLDV-MCNC: 18 MG/DL (ref 7–20)
CALCIUM SERPL-MCNC: 9.4 MG/DL (ref 8.3–10.6)
CHLORIDE BLD-SCNC: 102 MMOL/L (ref 99–110)
CO2: 26 MMOL/L (ref 21–32)
CREAT SERPL-MCNC: 0.9 MG/DL (ref 0.8–1.3)
EOSINOPHILS ABSOLUTE: 0 K/UL (ref 0–0.6)
EOSINOPHILS RELATIVE PERCENT: 0 %
GFR SERPL CREATININE-BSD FRML MDRD: >60 ML/MIN/{1.73_M2}
GLUCOSE BLD-MCNC: 77 MG/DL (ref 70–99)
HCT VFR BLD CALC: 45.3 % (ref 40.5–52.5)
HEMOGLOBIN: 15.7 G/DL (ref 13.5–17.5)
LYMPHOCYTES ABSOLUTE: 2 K/UL (ref 1–5.1)
LYMPHOCYTES RELATIVE PERCENT: 22 %
MCH RBC QN AUTO: 30.4 PG (ref 26–34)
MCHC RBC AUTO-ENTMCNC: 34.6 G/DL (ref 31–36)
MCV RBC AUTO: 87.8 FL (ref 80–100)
MONOCYTES ABSOLUTE: 0.9 K/UL (ref 0–1.3)
MONOCYTES RELATIVE PERCENT: 10 %
NEUTROPHILS ABSOLUTE: 6.1 K/UL (ref 1.7–7.7)
NEUTROPHILS RELATIVE PERCENT: 68 %
OVALOCYTES: ABNORMAL
PDW BLD-RTO: 14.3 % (ref 12.4–15.4)
PLATELET # BLD: 224 K/UL (ref 135–450)
PLATELET SLIDE REVIEW: ADEQUATE
PMV BLD AUTO: 9 FL (ref 5–10.5)
POIKILOCYTES: ABNORMAL
POTASSIUM SERPL-SCNC: 3.7 MMOL/L (ref 3.5–5.1)
RBC # BLD: 5.16 M/UL (ref 4.2–5.9)
SLIDE REVIEW: ABNORMAL
SODIUM BLD-SCNC: 140 MMOL/L (ref 136–145)
TOTAL PROTEIN: 6.9 G/DL (ref 6.4–8.2)
URIC ACID, SERUM: 7 MG/DL (ref 3.5–7.2)
WBC # BLD: 8.9 K/UL (ref 4–11)

## 2023-02-01 ENCOUNTER — TELEPHONE (OUTPATIENT)
Dept: CARDIOLOGY CLINIC | Age: 73
End: 2023-02-01

## 2023-02-01 NOTE — TELEPHONE ENCOUNTER
----- Message from DANIEL Hernandez CNP sent at 1/31/2023  4:48 PM EST -----  Please notify patient his echo is stable. His valves are leaky bu this is stable as well. Will monitor.

## 2023-02-01 NOTE — TELEPHONE ENCOUNTER
Created telephone encounter. Spoke with Alejandro Beebe relayed message per NPAM regarding echo. Pt verbalized understanding.

## 2023-02-07 ENCOUNTER — TELEPHONE (OUTPATIENT)
Dept: INTERNAL MEDICINE CLINIC | Age: 73
End: 2023-02-07

## 2023-02-07 NOTE — TELEPHONE ENCOUNTER
----- Message from Isabela Blackwell MD sent at 2/7/2023  2:35 PM EST -----  Contact: Patient [phone]  Mucinex, tylenol  ----- Message -----  From: Clemencia Heaton  Sent: 2/7/2023   1:58 PM EST  To: Isabela Blackwell MD    Patient would like something called into [facility] for a cold.  He has tested negative for Covid.  TC

## 2023-03-07 ENCOUNTER — NURSE ONLY (OUTPATIENT)
Dept: CARDIOLOGY CLINIC | Age: 73
End: 2023-03-07

## 2023-03-07 DIAGNOSIS — Z95.0 PACEMAKER: ICD-10-CM

## 2023-03-07 DIAGNOSIS — I44.2 COMPLETE HEART BLOCK (HCC): Primary | ICD-10-CM

## 2023-03-15 NOTE — PROGRESS NOTES
End of 91-day monitoring period 3/7  Pacing (% of Time Since 10-Yadiel-2023)   99.0% (MVP Off)  AP 4.5%  1 NSVT    Echo 12/2022-EF 55-60%    Remote transmission received for patient's dual chamber PACEMAKER.  Transmission shows normal sensing and pacing function.  EP physician will review.  See interrogation under the cardiology tab in the Paceart field for more details.  Will continue to monitor remotely.  Hx FFTWOS.     Dual chamber PPM 4/19/19 selective HIS capture < 2.75v.  His pacing threshold in bipolar mode is 1.5 V at 1 ms.  Since the pacing threshold in unipolar mode was lower, we reprogrammed him to unipolar pacing bipolar sensing.  With the output programmed to 2.5 V, he has selective His bundle pacing with right bundle branch block.  This change increased the battery life.

## 2023-05-16 ENCOUNTER — OFFICE VISIT (OUTPATIENT)
Dept: INTERNAL MEDICINE CLINIC | Age: 73
End: 2023-05-16

## 2023-05-16 VITALS
HEIGHT: 65 IN | DIASTOLIC BLOOD PRESSURE: 80 MMHG | HEART RATE: 70 BPM | RESPIRATION RATE: 12 BRPM | SYSTOLIC BLOOD PRESSURE: 120 MMHG | BODY MASS INDEX: 36.32 KG/M2 | WEIGHT: 218 LBS

## 2023-05-16 DIAGNOSIS — I10 PRIMARY HYPERTENSION: Primary | ICD-10-CM

## 2023-05-16 DIAGNOSIS — Z95.0 PACEMAKER: ICD-10-CM

## 2023-05-16 DIAGNOSIS — I35.1 MODERATE AORTIC INSUFFICIENCY: ICD-10-CM

## 2023-05-16 DIAGNOSIS — E78.5 HYPERLIPIDEMIA, UNSPECIFIED HYPERLIPIDEMIA TYPE: ICD-10-CM

## 2023-05-16 DIAGNOSIS — I44.2 COMPLETE HEART BLOCK (HCC): ICD-10-CM

## 2023-05-16 DIAGNOSIS — I34.0 MILD MITRAL REGURGITATION: ICD-10-CM

## 2023-05-16 DIAGNOSIS — F41.9 ANXIETY: ICD-10-CM

## 2023-05-16 LAB
CHOLEST SERPL-MCNC: 161 MG/DL (ref 0–199)
HDLC SERPL-MCNC: 60 MG/DL (ref 40–60)
LDLC SERPL CALC-MCNC: 83 MG/DL
TRIGL SERPL-MCNC: 92 MG/DL (ref 0–150)
VLDLC SERPL CALC-MCNC: 18 MG/DL

## 2023-05-16 PROCEDURE — 3074F SYST BP LT 130 MM HG: CPT | Performed by: INTERNAL MEDICINE

## 2023-05-16 PROCEDURE — 99214 OFFICE O/P EST MOD 30 MIN: CPT | Performed by: INTERNAL MEDICINE

## 2023-05-16 PROCEDURE — 3079F DIAST BP 80-89 MM HG: CPT | Performed by: INTERNAL MEDICINE

## 2023-05-16 PROCEDURE — 1123F ACP DISCUSS/DSCN MKR DOCD: CPT | Performed by: INTERNAL MEDICINE

## 2023-05-16 RX ORDER — FLUTICASONE PROPIONATE 50 MCG
2 SPRAY, SUSPENSION (ML) NASAL DAILY
Qty: 48 G | Refills: 1 | Status: SHIPPED | OUTPATIENT
Start: 2023-05-16

## 2023-05-16 ASSESSMENT — ENCOUNTER SYMPTOMS
RHINORRHEA: 0
COUGH: 0
ABDOMINAL PAIN: 0
VOMITING: 0
NAUSEA: 0
WHEEZING: 0
SHORTNESS OF BREATH: 0
BACK PAIN: 0

## 2023-05-16 NOTE — PROGRESS NOTES
EXTRACTION Left 5/1/2019    PHACO EMULSIFICATION OF CATARACT WITH  INTRAOCULAR LENS IMPLANT performed by Alfred Gutierrez MD at [address]  2019    SKIN BIOPSY Left 3/12/2021    WIDE LOCAL EXCISION OF LEFT EAR LESION WITH FROZEN SECTION, performed by Kimberly Garcia MD at [address] Left 3/12/2021    FULL THICKNESS SKIN GRAFT performed by Kimberly Garcia MD at [address] Left 2018       Family History   Problem Relation Age of Onset    Cancer Brother 62        prostate cancer    Other Mother         Lung fibrosis    Cancer Father         Prostate cancer    No Known Problems Sister        Social History     Tobacco Use    Smoking status: Never    Smokeless tobacco: Never   Vaping Use    Vaping Use: Never used   Substance Use Topics    Alcohol use: Not Currently     Comment: rarely    Drug use: No           /80 (Site: Right Upper Arm, Position: Sitting, Cuff Size: Large Adult)   Pulse 70   Resp 12   Ht 5' 5\" (1.651 m)   Wt 218 lb (98.9 kg)   BMI 36.28 kg/m²      Vitals:    05/16/23 0847   BP: 120/80   Site: Right Upper Arm   Position: Sitting   Cuff Size: Large Adult   Pulse: 70   Resp: 12   Weight: 218 lb (98.9 kg)   Height: 5' 5\" (1.651 m)        Objective:   Physical Exam  Constitutional:       Appearance: He is well-developed. HENT:      Head: Normocephalic and atraumatic. Eyes:      General: No scleral icterus. Conjunctiva/sclera: Conjunctivae normal.      Pupils: Pupils are equal, round, and reactive to light. Neck:      Thyroid: No thyromegaly. Cardiovascular:      Rate and Rhythm: Normal rate and regular rhythm. Heart sounds: Murmur heard. Pulmonary:      Effort: Pulmonary effort is normal.      Breath sounds: No decreased breath sounds or wheezing. Abdominal:      Palpations: Abdomen is soft. There is no mass. Tenderness: There is no abdominal tenderness. There is no rebound.    Musculoskeletal:      Cervical back: Normal range of

## 2023-06-08 ENCOUNTER — OFFICE VISIT (OUTPATIENT)
Dept: CARDIOLOGY CLINIC | Age: 73
End: 2023-06-08
Payer: COMMERCIAL

## 2023-06-08 ENCOUNTER — NURSE ONLY (OUTPATIENT)
Dept: CARDIOLOGY CLINIC | Age: 73
End: 2023-06-08
Payer: COMMERCIAL

## 2023-06-08 VITALS
DIASTOLIC BLOOD PRESSURE: 70 MMHG | OXYGEN SATURATION: 96 % | SYSTOLIC BLOOD PRESSURE: 132 MMHG | HEART RATE: 72 BPM | WEIGHT: 217 LBS | BODY MASS INDEX: 34.87 KG/M2 | HEIGHT: 66 IN

## 2023-06-08 DIAGNOSIS — Z95.0 PACEMAKER: ICD-10-CM

## 2023-06-08 DIAGNOSIS — I44.2 COMPLETE HEART BLOCK (HCC): Primary | ICD-10-CM

## 2023-06-08 DIAGNOSIS — I44.7 LBBB (LEFT BUNDLE BRANCH BLOCK): ICD-10-CM

## 2023-06-08 DIAGNOSIS — I10 ESSENTIAL HYPERTENSION: ICD-10-CM

## 2023-06-08 PROCEDURE — 3078F DIAST BP <80 MM HG: CPT | Performed by: NURSE PRACTITIONER

## 2023-06-08 PROCEDURE — 93000 ELECTROCARDIOGRAM COMPLETE: CPT | Performed by: NURSE PRACTITIONER

## 2023-06-08 PROCEDURE — 93280 PM DEVICE PROGR EVAL DUAL: CPT | Performed by: INTERNAL MEDICINE

## 2023-06-08 PROCEDURE — 1123F ACP DISCUSS/DSCN MKR DOCD: CPT | Performed by: NURSE PRACTITIONER

## 2023-06-08 PROCEDURE — 3075F SYST BP GE 130 - 139MM HG: CPT | Performed by: NURSE PRACTITIONER

## 2023-06-08 PROCEDURE — 99214 OFFICE O/P EST MOD 30 MIN: CPT | Performed by: NURSE PRACTITIONER

## 2023-06-08 NOTE — PROGRESS NOTES
symmetrical and full   Abdomen:  No masses or tenderness  Bowel sounds present  Extremities:   No cyanosis or clubbing   No lower extremity edema   Skin: warm and dry  Neurological:  Alert and oriented  Moves all extremities well  No abnormalities of mood, affect, memory, mentation, or behavior are noted    DATA:    ECG 6/8/2023:  AS  69 bpm     ECG 6/7/2022:  AS  64 bpm     Echo 1/2023:   Conclusions      Summary   Left ventricular systolic function is normal with ejection fraction   estimated at 55-60 %. No regional wall motion abnormalities are noted. There is mild concentric left ventricular hypertrophy. Left ventricle size is   normal.   Normal left ventricular diastolic filling pressure. Mild mitral regurgitation. Moderate aortic regurgitation is present. Mild tricuspid regurgitation. Systolic pulmonary artery pressure (SPAP) estimated at 43 mmHg (RA pressure   3 mmHg), consistent with mild pulmonary hypertension. Mild pulmonic regurgitation present. Pacemaker / ICD lead was visualized in the right atrium. 4- 55-60%, SPAP 24 mmHg. Echo 4/2019:   Summary   Normal left ventricular systolic function with ejection fraction of 55-60%. The study is inadequate to exclude regional wall motion abnormalities. Systolic pulmonic artery pressure (SPAP) is normal estimated at 24 mmHg   (Right atrial pressure of 3 mmHg). Compared to previous study from 12- no changes noted. Echo 12/2017:     Summary   Normal left ventricle systolic function with an estimated ejection fraction   of 55%. No regional wall motion abnormalities are seen. Diastolic filling parameters suggest normal diastolic filing pressure. Mild mitral regurgitation. Individual aortic valve leaflets are not clearly visualized, aortic valve   appears sclerotic. Moderate aortic regurgitation. Systolic pulmonary artery pressure (SPAP) is normal and estimated at 15 mmHg   (RA pressure of 3 mmHg).     All

## 2023-06-08 NOTE — PROGRESS NOTES
Patient presents to the device clinic today for a programming evaluation for his pacemaker. Patient has a history of CHB. Last device interrogation was on 3/7. Since then, 2 NSVT events recorded- each x 14 beats. P wave: 4.5 mV  R wave: N/A    AP 5.2%  99.1%    All sensing and pacing parameters are within normal range. No changes need to be made at this time. Patient will see BRITTANY Escalona in office today. Patient education was provided about device functionality, in home monitoring, and any other patient questions and/or concerns were addressed. Patient voices understanding. Patient will follow up in 3 months in office or remotely. Please see interrogation for more detail - Paceart report located under the Cardiology tab.

## 2023-08-23 ENCOUNTER — OFFICE VISIT (OUTPATIENT)
Dept: INTERNAL MEDICINE CLINIC | Age: 73
End: 2023-08-23

## 2023-08-23 VITALS
HEART RATE: 70 BPM | RESPIRATION RATE: 12 BRPM | SYSTOLIC BLOOD PRESSURE: 124 MMHG | BODY MASS INDEX: 34.72 KG/M2 | HEIGHT: 66 IN | WEIGHT: 216 LBS | DIASTOLIC BLOOD PRESSURE: 76 MMHG

## 2023-08-23 DIAGNOSIS — F41.9 ANXIETY: ICD-10-CM

## 2023-08-23 DIAGNOSIS — I44.2 COMPLETE HEART BLOCK (HCC): ICD-10-CM

## 2023-08-23 DIAGNOSIS — R21 RASH: ICD-10-CM

## 2023-08-23 DIAGNOSIS — Z95.0 PACEMAKER: ICD-10-CM

## 2023-08-23 DIAGNOSIS — I10 PRIMARY HYPERTENSION: Primary | ICD-10-CM

## 2023-08-23 DIAGNOSIS — Z12.11 COLON CANCER SCREENING: ICD-10-CM

## 2023-08-23 DIAGNOSIS — I10 PRIMARY HYPERTENSION: ICD-10-CM

## 2023-08-23 DIAGNOSIS — E78.5 HYPERLIPIDEMIA, UNSPECIFIED HYPERLIPIDEMIA TYPE: ICD-10-CM

## 2023-08-23 LAB
ALBUMIN SERPL-MCNC: 4.2 G/DL (ref 3.4–5)
ALBUMIN/GLOB SERPL: 1.4 {RATIO} (ref 1.1–2.2)
ALP SERPL-CCNC: 61 U/L (ref 40–129)
ALT SERPL-CCNC: 21 U/L (ref 10–40)
ANION GAP SERPL CALCULATED.3IONS-SCNC: 12 MMOL/L (ref 3–16)
AST SERPL-CCNC: 25 U/L (ref 15–37)
BASOPHILS # BLD: 0 K/UL (ref 0–0.2)
BASOPHILS NFR BLD: 0.7 %
BILIRUB SERPL-MCNC: 0.7 MG/DL (ref 0–1)
BUN SERPL-MCNC: 17 MG/DL (ref 7–20)
CALCIUM SERPL-MCNC: 10.2 MG/DL (ref 8.3–10.6)
CHLORIDE SERPL-SCNC: 104 MMOL/L (ref 99–110)
CO2 SERPL-SCNC: 28 MMOL/L (ref 21–32)
CREAT SERPL-MCNC: 0.8 MG/DL (ref 0.8–1.3)
DEPRECATED RDW RBC AUTO: 14.5 % (ref 12.4–15.4)
EOSINOPHIL # BLD: 0.1 K/UL (ref 0–0.6)
EOSINOPHIL NFR BLD: 1 %
GFR SERPLBLD CREATININE-BSD FMLA CKD-EPI: >60 ML/MIN/{1.73_M2}
GLUCOSE SERPL-MCNC: 96 MG/DL (ref 70–99)
HCT VFR BLD AUTO: 46.6 % (ref 40.5–52.5)
HGB BLD-MCNC: 16 G/DL (ref 13.5–17.5)
LYMPHOCYTES # BLD: 1.5 K/UL (ref 1–5.1)
LYMPHOCYTES NFR BLD: 22 %
MCH RBC QN AUTO: 30.9 PG (ref 26–34)
MCHC RBC AUTO-ENTMCNC: 34.3 G/DL (ref 31–36)
MCV RBC AUTO: 90.1 FL (ref 80–100)
MONOCYTES # BLD: 0.7 K/UL (ref 0–1.3)
MONOCYTES NFR BLD: 9.8 %
NEUTROPHILS # BLD: 4.6 K/UL (ref 1.7–7.7)
NEUTROPHILS NFR BLD: 66.5 %
PLATELET # BLD AUTO: 191 K/UL (ref 135–450)
PMV BLD AUTO: 8.6 FL (ref 5–10.5)
POTASSIUM SERPL-SCNC: 3.9 MMOL/L (ref 3.5–5.1)
PROT SERPL-MCNC: 7.2 G/DL (ref 6.4–8.2)
RBC # BLD AUTO: 5.17 M/UL (ref 4.2–5.9)
SODIUM SERPL-SCNC: 144 MMOL/L (ref 136–145)
URATE SERPL-MCNC: 8.3 MG/DL (ref 3.5–7.2)
WBC # BLD AUTO: 6.9 K/UL (ref 4–11)

## 2023-08-23 PROCEDURE — 90715 TDAP VACCINE 7 YRS/> IM: CPT | Performed by: INTERNAL MEDICINE

## 2023-08-23 PROCEDURE — 3078F DIAST BP <80 MM HG: CPT | Performed by: INTERNAL MEDICINE

## 2023-08-23 PROCEDURE — G8417 CALC BMI ABV UP PARAM F/U: HCPCS | Performed by: INTERNAL MEDICINE

## 2023-08-23 PROCEDURE — 3017F COLORECTAL CA SCREEN DOC REV: CPT | Performed by: INTERNAL MEDICINE

## 2023-08-23 PROCEDURE — G8427 DOCREV CUR MEDS BY ELIG CLIN: HCPCS | Performed by: INTERNAL MEDICINE

## 2023-08-23 PROCEDURE — 90471 IMMUNIZATION ADMIN: CPT | Performed by: INTERNAL MEDICINE

## 2023-08-23 PROCEDURE — 1036F TOBACCO NON-USER: CPT | Performed by: INTERNAL MEDICINE

## 2023-08-23 PROCEDURE — 3074F SYST BP LT 130 MM HG: CPT | Performed by: INTERNAL MEDICINE

## 2023-08-23 PROCEDURE — 1123F ACP DISCUSS/DSCN MKR DOCD: CPT | Performed by: INTERNAL MEDICINE

## 2023-08-23 PROCEDURE — 99214 OFFICE O/P EST MOD 30 MIN: CPT | Performed by: INTERNAL MEDICINE

## 2023-08-23 RX ORDER — TELMISARTAN AND HYDROCHLORTHIAZIDE 80; 25 MG/1; MG/1
TABLET ORAL
Qty: 90 TABLET | Refills: 3 | Status: SHIPPED | OUTPATIENT
Start: 2023-08-23

## 2023-08-23 RX ORDER — PRAVASTATIN SODIUM 80 MG/1
TABLET ORAL
Qty: 90 TABLET | Refills: 3 | Status: SHIPPED | OUTPATIENT
Start: 2023-08-23

## 2023-08-23 RX ORDER — VENLAFAXINE HYDROCHLORIDE 75 MG/1
CAPSULE, EXTENDED RELEASE ORAL
Qty: 90 CAPSULE | Refills: 3 | Status: SHIPPED | OUTPATIENT
Start: 2023-08-23

## 2023-08-23 RX ORDER — CLOTRIMAZOLE AND BETAMETHASONE DIPROPIONATE 10; .64 MG/G; MG/G
CREAM TOPICAL
Qty: 45 G | Refills: 0 | Status: SHIPPED | OUTPATIENT
Start: 2023-08-23

## 2023-08-23 ASSESSMENT — ENCOUNTER SYMPTOMS
NAUSEA: 0
WHEEZING: 0
RHINORRHEA: 0
VOMITING: 0
SHORTNESS OF BREATH: 0
BACK PAIN: 0
ABDOMINAL PAIN: 0
COUGH: 0

## 2023-09-12 PROCEDURE — 93296 REM INTERROG EVL PM/IDS: CPT | Performed by: INTERNAL MEDICINE

## 2023-09-12 PROCEDURE — 93294 REM INTERROG EVL PM/LDLS PM: CPT | Performed by: INTERNAL MEDICINE

## 2023-11-27 ENCOUNTER — OFFICE VISIT (OUTPATIENT)
Dept: INTERNAL MEDICINE CLINIC | Age: 73
End: 2023-11-27

## 2023-11-27 VITALS
RESPIRATION RATE: 14 BRPM | HEART RATE: 60 BPM | DIASTOLIC BLOOD PRESSURE: 70 MMHG | BODY MASS INDEX: 34.39 KG/M2 | SYSTOLIC BLOOD PRESSURE: 126 MMHG | WEIGHT: 214 LBS | HEIGHT: 66 IN

## 2023-11-27 DIAGNOSIS — Z95.0 PACEMAKER: ICD-10-CM

## 2023-11-27 DIAGNOSIS — F41.9 ANXIETY: ICD-10-CM

## 2023-11-27 DIAGNOSIS — E78.5 HYPERLIPIDEMIA, UNSPECIFIED HYPERLIPIDEMIA TYPE: ICD-10-CM

## 2023-11-27 DIAGNOSIS — I10 PRIMARY HYPERTENSION: Primary | ICD-10-CM

## 2023-11-27 DIAGNOSIS — N40.0 HYPERTROPHY OF PROSTATE WITHOUT URINARY OBSTRUCTION: ICD-10-CM

## 2023-11-27 DIAGNOSIS — I35.1 MODERATE AORTIC INSUFFICIENCY: ICD-10-CM

## 2023-11-27 DIAGNOSIS — Z00.00 ROUTINE GENERAL MEDICAL EXAMINATION AT A HEALTH CARE FACILITY: ICD-10-CM

## 2023-11-27 DIAGNOSIS — I34.0 MILD MITRAL REGURGITATION: ICD-10-CM

## 2023-11-27 DIAGNOSIS — I44.2 COMPLETE HEART BLOCK (HCC): ICD-10-CM

## 2023-11-27 DIAGNOSIS — Z12.11 COLON CANCER SCREENING: ICD-10-CM

## 2023-11-27 PROCEDURE — 99397 PER PM REEVAL EST PAT 65+ YR: CPT | Performed by: NURSE PRACTITIONER

## 2023-11-27 PROCEDURE — 3074F SYST BP LT 130 MM HG: CPT | Performed by: NURSE PRACTITIONER

## 2023-11-27 PROCEDURE — 3078F DIAST BP <80 MM HG: CPT | Performed by: NURSE PRACTITIONER

## 2023-11-27 PROCEDURE — G8484 FLU IMMUNIZE NO ADMIN: HCPCS | Performed by: NURSE PRACTITIONER

## 2023-11-27 RX ORDER — CLOTRIMAZOLE AND BETAMETHASONE DIPROPIONATE 10; .64 MG/G; MG/G
CREAM TOPICAL
Qty: 45 G | Refills: 0 | Status: SHIPPED | OUTPATIENT
Start: 2023-11-27

## 2023-11-27 RX ORDER — TELMISARTAN AND HYDROCHLORTHIAZIDE 80; 25 MG/1; MG/1
TABLET ORAL
Qty: 90 TABLET | Refills: 3 | Status: SHIPPED | OUTPATIENT
Start: 2023-11-27

## 2023-11-27 RX ORDER — PRAVASTATIN SODIUM 80 MG/1
TABLET ORAL
Qty: 90 TABLET | Refills: 3 | Status: SHIPPED | OUTPATIENT
Start: 2023-11-27

## 2023-11-27 RX ORDER — VENLAFAXINE HYDROCHLORIDE 75 MG/1
CAPSULE, EXTENDED RELEASE ORAL
Qty: 90 CAPSULE | Refills: 3 | Status: SHIPPED | OUTPATIENT
Start: 2023-11-27

## 2023-11-27 ASSESSMENT — ENCOUNTER SYMPTOMS
WHEEZING: 0
NAUSEA: 0
BACK PAIN: 0
ABDOMINAL PAIN: 0
COUGH: 0
SHORTNESS OF BREATH: 0
RHINORRHEA: 0
VOMITING: 0

## 2023-11-27 NOTE — PROGRESS NOTES
Subjective:      Patient ID: Jorge Bruno is a 75 y.o. . HPI    Patient is here for physical exam.  He has hypertension and hyperlipidemia. Patient's BP is controlled on current medications. No chest pain. No leg edema. His cholesterol is at goal. No myalgia. He has lost 2 lbs since last visit. His anxiety is controlled. He takes Effexor XR. He had a complete heart block and has a pacemaker. He has moderate AI and mild MR. No changes. He f/w Dr. Drea Vitale. His vision is stable. He has had a prior vitrectomy. He had a secondary cataract of the left eye and had laser done. It helped. He is due for a repeat colonoscopy. His last colonoscopy was in 7/2018 and it showed a tubular adenoma. Review of Systems   Constitutional:  Negative for activity change and appetite change. HENT:  Negative for postnasal drip and rhinorrhea. Respiratory:  Negative for cough, shortness of breath and wheezing. Cardiovascular:  Negative for chest pain, palpitations and leg swelling. Gastrointestinal:  Negative for abdominal pain, nausea and vomiting. Genitourinary:  Negative for difficulty urinating and frequency. Musculoskeletal:  Negative for back pain and joint swelling. Neurological:  Negative for light-headedness. Psychiatric/Behavioral:  Negative for sleep disturbance. Current Outpatient Medications   Medication Instructions    clotrimazole-betamethasone (LOTRISONE) 1-0.05 % cream Apply topically 2 times daily.     fluticasone (FLONASE) 50 MCG/ACT nasal spray 2 sprays, Each Nostril, DAILY    pravastatin (PRAVACHOL) 80 MG tablet TAKE ONE TABLET DAILY AT BEDTIME    telmisartan-hydroCHLOROthiazide (MICARDIS HCT) 80-25 MG per tablet TAKE ONE TABLET BY MOUTH DAILY    venlafaxine (EFFEXOR XR) 75 MG extended release capsule TAKE ONE CAPSULE BY MOUTH DAILY          Past Medical History:   Diagnosis Date    Anxiety 12/21/2012    Hyperlipidemia     Hypertension     Hypertrophy of prostate

## 2023-12-14 PROCEDURE — 93294 REM INTERROG EVL PM/LDLS PM: CPT | Performed by: INTERNAL MEDICINE

## 2023-12-14 PROCEDURE — 93296 REM INTERROG EVL PM/IDS: CPT | Performed by: INTERNAL MEDICINE

## 2024-02-06 NOTE — PROGRESS NOTES
[facility]   Electrophysiology Outpatient Note              Date:  February 6, 2024  Patient name: Nirmal Dunbar  YOB: 1950    Primary Care physician: Reese Huynh MD    HISTORY OF PRESENT ILLNESS: The patient is a 73 y.o.  male with a history of CHB, HTN, HLD, LBBB, MR and AI.   In 4/2019, he was admitted with dizziness.  He developed a 2-1 AV block and CHB with alternating bundle branch block.  He underwent implantation of a dual-chamber pacemaker with a His bundle lead.  Echo showed an EF of 55 to 60%.  In 8/2020, he presented to the ED with complaints of bradycardia and dizziness.  Device interrogation at this time showed an increased His lead threshold with intermittent capture and output was increased to 5V at 1 ms.  In 8/2020, his RV output was changed to 2.5V at 1 ms unipolar.   Today he is being seen for CHB. EKG shows AS  with a HR of 85. He is feeling well. He has no cardiac complaints. Denies chest pain, palpitations, shortness of breath, and dizziness.         Device check today shows:   Brand: eXenSa   Mode: DDD  Normal function   8.1% AP  99.1%     Arrhythmias: brief NSVT  Battery life 10 mos   RA impedance 494 ohms   RV impedance 285 ohms   RA threshold 0.75 V @ 0.40 ms  RV threshold 2.25 @ 0.40 ms   RA sensitivity 0.30 mV  RV sensitivity 0.45 mV             Past Medical History:   has a past medical history of Anxiety, Hyperlipidemia, Hypertension, Hypertrophy of prostate without urinary obstruction, Mild mitral regurgitation, and Moderate aortic insufficiency.    Past Surgical History:   has a past surgical history that includes Colonoscopy (4/21/2008); vitrectomy (Left, 2018); Cataract removal with implant (Left, 05/01/2019); Intracapsular cataract extraction (Left, 5/1/2019); Cardiac pacemaker placement (04/19/2019); pacemaker placement (2019); skin biopsy (Left, 3/12/2021); and Skin graft (Left, 3/12/2021).     Home Medications:    Prior

## 2024-02-07 ENCOUNTER — NURSE ONLY (OUTPATIENT)
Dept: CARDIOLOGY CLINIC | Age: 74
End: 2024-02-07
Payer: COMMERCIAL

## 2024-02-07 ENCOUNTER — OFFICE VISIT (OUTPATIENT)
Dept: CARDIOLOGY CLINIC | Age: 74
End: 2024-02-07
Payer: COMMERCIAL

## 2024-02-07 VITALS
HEIGHT: 65 IN | DIASTOLIC BLOOD PRESSURE: 70 MMHG | WEIGHT: 207.6 LBS | SYSTOLIC BLOOD PRESSURE: 130 MMHG | OXYGEN SATURATION: 93 % | BODY MASS INDEX: 34.59 KG/M2 | HEART RATE: 66 BPM

## 2024-02-07 DIAGNOSIS — Z95.0 PACEMAKER: Primary | ICD-10-CM

## 2024-02-07 DIAGNOSIS — I44.2 COMPLETE HEART BLOCK (HCC): ICD-10-CM

## 2024-02-07 DIAGNOSIS — I44.7 LBBB (LEFT BUNDLE BRANCH BLOCK): ICD-10-CM

## 2024-02-07 DIAGNOSIS — I10 ESSENTIAL HYPERTENSION: ICD-10-CM

## 2024-02-07 DIAGNOSIS — I47.29 NSVT (NONSUSTAINED VENTRICULAR TACHYCARDIA) (HCC): Primary | ICD-10-CM

## 2024-02-07 PROCEDURE — G8427 DOCREV CUR MEDS BY ELIG CLIN: HCPCS | Performed by: NURSE PRACTITIONER

## 2024-02-07 PROCEDURE — 3078F DIAST BP <80 MM HG: CPT | Performed by: NURSE PRACTITIONER

## 2024-02-07 PROCEDURE — G8417 CALC BMI ABV UP PARAM F/U: HCPCS | Performed by: NURSE PRACTITIONER

## 2024-02-07 PROCEDURE — 3075F SYST BP GE 130 - 139MM HG: CPT | Performed by: NURSE PRACTITIONER

## 2024-02-07 PROCEDURE — 1036F TOBACCO NON-USER: CPT | Performed by: NURSE PRACTITIONER

## 2024-02-07 PROCEDURE — 93280 PM DEVICE PROGR EVAL DUAL: CPT | Performed by: INTERNAL MEDICINE

## 2024-02-07 PROCEDURE — 1123F ACP DISCUSS/DSCN MKR DOCD: CPT | Performed by: NURSE PRACTITIONER

## 2024-02-07 PROCEDURE — G8484 FLU IMMUNIZE NO ADMIN: HCPCS | Performed by: NURSE PRACTITIONER

## 2024-02-07 PROCEDURE — 99214 OFFICE O/P EST MOD 30 MIN: CPT | Performed by: NURSE PRACTITIONER

## 2024-02-07 PROCEDURE — 3017F COLORECTAL CA SCREEN DOC REV: CPT | Performed by: NURSE PRACTITIONER

## 2024-02-07 NOTE — PATIENT INSTRUCTIONS
NO changes today    Stress test. Call [phone] to schedule.     Remote device transmissions every three months     Follow up in 6 months with Dr. Elizabeth

## 2024-03-05 ENCOUNTER — HOSPITAL ENCOUNTER (OUTPATIENT)
Dept: CARDIOLOGY | Age: 74
Discharge: HOME | End: 2024-03-05
Payer: COMMERCIAL

## 2024-03-05 DIAGNOSIS — I47.29 NSVT (NONSUSTAINED VENTRICULAR TACHYCARDIA) (HCC): ICD-10-CM

## 2024-03-05 PROCEDURE — 6360000002 HC RX W HCPCS: Performed by: NURSE PRACTITIONER

## 2024-03-05 PROCEDURE — 3430000000 HC RX DIAGNOSTIC RADIOPHARMACEUTICAL: Performed by: NURSE PRACTITIONER

## 2024-03-05 PROCEDURE — 78452 HT MUSCLE IMAGE SPECT MULT: CPT

## 2024-03-05 PROCEDURE — 93017 CV STRESS TEST TRACING ONLY: CPT

## 2024-03-05 PROCEDURE — A9502 TC99M TETROFOSMIN: HCPCS | Performed by: NURSE PRACTITIONER

## 2024-03-05 RX ORDER — REGADENOSON 0.08 MG/ML
0.4 INJECTION, SOLUTION INTRAVENOUS
Status: COMPLETED | OUTPATIENT
Start: 2024-03-05 | End: 2024-03-05

## 2024-03-05 RX ADMIN — TETROFOSMIN 33 MILLICURIE: 1.38 INJECTION, POWDER, LYOPHILIZED, FOR SOLUTION INTRAVENOUS at 13:30

## 2024-03-05 RX ADMIN — TETROFOSMIN 11.1 MILLICURIE: 1.38 INJECTION, POWDER, LYOPHILIZED, FOR SOLUTION INTRAVENOUS at 12:20

## 2024-03-05 RX ADMIN — REGADENOSON 0.4 MG: 0.08 INJECTION, SOLUTION INTRAVENOUS at 13:30

## 2024-03-06 ENCOUNTER — TELEPHONE (OUTPATIENT)
Dept: CARDIOLOGY CLINIC | Age: 74
End: 2024-03-06

## 2024-03-06 SDOH — ECONOMIC STABILITY: FOOD INSECURITY: WITHIN THE PAST 12 MONTHS, YOU WORRIED THAT YOUR FOOD WOULD RUN OUT BEFORE YOU GOT MONEY TO BUY MORE.: NEVER TRUE

## 2024-03-06 SDOH — ECONOMIC STABILITY: INCOME INSECURITY: HOW HARD IS IT FOR YOU TO PAY FOR THE VERY BASICS LIKE FOOD, HOUSING, MEDICAL CARE, AND HEATING?: NOT HARD AT ALL

## 2024-03-06 SDOH — ECONOMIC STABILITY: FOOD INSECURITY: WITHIN THE PAST 12 MONTHS, THE FOOD YOU BOUGHT JUST DIDN'T LAST AND YOU DIDN'T HAVE MONEY TO GET MORE.: NEVER TRUE

## 2024-03-06 SDOH — ECONOMIC STABILITY: TRANSPORTATION INSECURITY
IN THE PAST 12 MONTHS, HAS LACK OF TRANSPORTATION KEPT YOU FROM MEETINGS, WORK, OR FROM GETTING THINGS NEEDED FOR DAILY LIVING?: NO

## 2024-03-06 SDOH — ECONOMIC STABILITY: HOUSING INSECURITY
IN THE LAST 12 MONTHS, WAS THERE A TIME WHEN YOU DID NOT HAVE A STEADY PLACE TO SLEEP OR SLEPT IN A SHELTER (INCLUDING NOW)?: NO

## 2024-03-07 ENCOUNTER — HOSPITAL ENCOUNTER (OUTPATIENT)
Age: 74
Discharge: HOME | End: 2024-03-07
Payer: COMMERCIAL

## 2024-03-07 ENCOUNTER — OFFICE VISIT (OUTPATIENT)
Dept: INTERNAL MEDICINE CLINIC | Age: 74
End: 2024-03-07

## 2024-03-07 VITALS
DIASTOLIC BLOOD PRESSURE: 70 MMHG | HEIGHT: 65 IN | SYSTOLIC BLOOD PRESSURE: 130 MMHG | HEART RATE: 70 BPM | BODY MASS INDEX: 34.32 KG/M2 | RESPIRATION RATE: 12 BRPM | WEIGHT: 206 LBS

## 2024-03-07 DIAGNOSIS — F41.9 ANXIETY: ICD-10-CM

## 2024-03-07 DIAGNOSIS — E78.5 HYPERLIPIDEMIA, UNSPECIFIED HYPERLIPIDEMIA TYPE: ICD-10-CM

## 2024-03-07 DIAGNOSIS — I44.7 LBBB (LEFT BUNDLE BRANCH BLOCK): ICD-10-CM

## 2024-03-07 DIAGNOSIS — Z95.0 PACEMAKER: ICD-10-CM

## 2024-03-07 DIAGNOSIS — Z80.42 FH: PROSTATE CANCER: ICD-10-CM

## 2024-03-07 DIAGNOSIS — I44.2 COMPLETE HEART BLOCK (HCC): ICD-10-CM

## 2024-03-07 DIAGNOSIS — N40.0 HYPERTROPHY OF PROSTATE WITHOUT URINARY OBSTRUCTION: ICD-10-CM

## 2024-03-07 DIAGNOSIS — I10 PRIMARY HYPERTENSION: Primary | ICD-10-CM

## 2024-03-07 DIAGNOSIS — I34.0 MILD MITRAL REGURGITATION: ICD-10-CM

## 2024-03-07 DIAGNOSIS — I35.1 MODERATE AORTIC INSUFFICIENCY: ICD-10-CM

## 2024-03-07 LAB
ALBUMIN SERPL-MCNC: 4.2 G/DL (ref 3.4–5)
ALBUMIN/GLOB SERPL: 1.3 {RATIO} (ref 1.1–2.2)
ALP SERPL-CCNC: 58 U/L (ref 40–129)
ALT SERPL-CCNC: 17 U/L (ref 10–40)
ANION GAP SERPL CALCULATED.3IONS-SCNC: 15 MMOL/L (ref 3–16)
AST SERPL-CCNC: 29 U/L (ref 15–37)
BASOPHILS # BLD: 0.1 K/UL (ref 0–0.2)
BASOPHILS NFR BLD: 0.7 %
BILIRUB SERPL-MCNC: 1 MG/DL (ref 0–1)
BUN SERPL-MCNC: 17 MG/DL (ref 7–20)
CALCIUM SERPL-MCNC: 9.8 MG/DL (ref 8.3–10.6)
CHLORIDE SERPL-SCNC: 102 MMOL/L (ref 99–110)
CHOLEST SERPL-MCNC: 156 MG/DL (ref 0–199)
CO2 SERPL-SCNC: 24 MMOL/L (ref 21–32)
CREAT SERPL-MCNC: 0.9 MG/DL (ref 0.8–1.3)
DEPRECATED RDW RBC AUTO: 13.5 % (ref 12.4–15.4)
EOSINOPHIL # BLD: 0.1 K/UL (ref 0–0.6)
EOSINOPHIL NFR BLD: 1.2 %
GFR SERPLBLD CREATININE-BSD FMLA CKD-EPI: >60 ML/MIN/{1.73_M2}
GLUCOSE SERPL-MCNC: 85 MG/DL (ref 70–99)
HCT VFR BLD AUTO: 45.6 % (ref 40.5–52.5)
HDLC SERPL-MCNC: 53 MG/DL (ref 40–60)
HGB BLD-MCNC: 16 G/DL (ref 13.5–17.5)
LDLC SERPL CALC-MCNC: 84 MG/DL
LYMPHOCYTES # BLD: 1.6 K/UL (ref 1–5.1)
LYMPHOCYTES NFR BLD: 22.3 %
MCH RBC QN AUTO: 30.7 PG (ref 26–34)
MCHC RBC AUTO-ENTMCNC: 35.1 G/DL (ref 31–36)
MCV RBC AUTO: 87.6 FL (ref 80–100)
MONOCYTES # BLD: 0.8 K/UL (ref 0–1.3)
MONOCYTES NFR BLD: 11.1 %
NEUTROPHILS # BLD: 4.6 K/UL (ref 1.7–7.7)
NEUTROPHILS NFR BLD: 64.7 %
PLATELET # BLD AUTO: 195 K/UL (ref 135–450)
PMV BLD AUTO: 9 FL (ref 5–10.5)
POTASSIUM SERPL-SCNC: 3.5 MMOL/L (ref 3.5–5.1)
PROT SERPL-MCNC: 7.5 G/DL (ref 6.4–8.2)
PSA SERPL DL<=0.01 NG/ML-MCNC: 3.17 NG/ML (ref 0–4)
RBC # BLD AUTO: 5.21 M/UL (ref 4.2–5.9)
SODIUM SERPL-SCNC: 141 MMOL/L (ref 136–145)
TRIGL SERPL-MCNC: 95 MG/DL (ref 0–150)
TSH SERPL DL<=0.005 MIU/L-ACNC: 3.66 UIU/ML (ref 0.27–4.2)
URATE SERPL-MCNC: 7 MG/DL (ref 3.5–7.2)
VLDLC SERPL CALC-MCNC: 19 MG/DL
WBC # BLD AUTO: 7.1 K/UL (ref 4–11)

## 2024-03-07 PROCEDURE — 84550 ASSAY OF BLOOD/URIC ACID: CPT

## 2024-03-07 PROCEDURE — 1036F TOBACCO NON-USER: CPT | Performed by: INTERNAL MEDICINE

## 2024-03-07 PROCEDURE — G8484 FLU IMMUNIZE NO ADMIN: HCPCS | Performed by: INTERNAL MEDICINE

## 2024-03-07 PROCEDURE — 84153 ASSAY OF PSA TOTAL: CPT

## 2024-03-07 PROCEDURE — 1123F ACP DISCUSS/DSCN MKR DOCD: CPT | Performed by: INTERNAL MEDICINE

## 2024-03-07 PROCEDURE — 3017F COLORECTAL CA SCREEN DOC REV: CPT | Performed by: INTERNAL MEDICINE

## 2024-03-07 PROCEDURE — 80053 COMPREHEN METABOLIC PANEL: CPT

## 2024-03-07 PROCEDURE — 85025 COMPLETE CBC W/AUTO DIFF WBC: CPT

## 2024-03-07 PROCEDURE — G8417 CALC BMI ABV UP PARAM F/U: HCPCS | Performed by: INTERNAL MEDICINE

## 2024-03-07 PROCEDURE — 84443 ASSAY THYROID STIM HORMONE: CPT

## 2024-03-07 PROCEDURE — 36415 COLL VENOUS BLD VENIPUNCTURE: CPT

## 2024-03-07 PROCEDURE — 3078F DIAST BP <80 MM HG: CPT | Performed by: INTERNAL MEDICINE

## 2024-03-07 PROCEDURE — 3075F SYST BP GE 130 - 139MM HG: CPT | Performed by: INTERNAL MEDICINE

## 2024-03-07 PROCEDURE — G8427 DOCREV CUR MEDS BY ELIG CLIN: HCPCS | Performed by: INTERNAL MEDICINE

## 2024-03-07 PROCEDURE — 80061 LIPID PANEL: CPT

## 2024-03-07 PROCEDURE — 99214 OFFICE O/P EST MOD 30 MIN: CPT | Performed by: INTERNAL MEDICINE

## 2024-03-07 RX ORDER — VENLAFAXINE HYDROCHLORIDE 75 MG/1
CAPSULE, EXTENDED RELEASE ORAL
Qty: 90 CAPSULE | Refills: 3 | Status: SHIPPED | OUTPATIENT
Start: 2024-03-07

## 2024-03-07 RX ORDER — TELMISARTAN AND HYDROCHLORTHIAZIDE 80; 25 MG/1; MG/1
TABLET ORAL
Qty: 90 TABLET | Refills: 3 | Status: SHIPPED | OUTPATIENT
Start: 2024-03-07

## 2024-03-07 RX ORDER — PRAVASTATIN SODIUM 80 MG/1
TABLET ORAL
Qty: 90 TABLET | Refills: 3 | Status: SHIPPED | OUTPATIENT
Start: 2024-03-07

## 2024-03-07 ASSESSMENT — PATIENT HEALTH QUESTIONNAIRE - PHQ9
SUM OF ALL RESPONSES TO PHQ QUESTIONS 1-9: 0
SUM OF ALL RESPONSES TO PHQ QUESTIONS 1-9: 0
2. FEELING DOWN, DEPRESSED OR HOPELESS: 0
1. LITTLE INTEREST OR PLEASURE IN DOING THINGS: 0
SUM OF ALL RESPONSES TO PHQ9 QUESTIONS 1 & 2: 0
SUM OF ALL RESPONSES TO PHQ QUESTIONS 1-9: 0
SUM OF ALL RESPONSES TO PHQ QUESTIONS 1-9: 0

## 2024-03-07 ASSESSMENT — ENCOUNTER SYMPTOMS
WHEEZING: 0
SHORTNESS OF BREATH: 0
ABDOMINAL PAIN: 0
NAUSEA: 0
RHINORRHEA: 0
BACK PAIN: 0
VOMITING: 0
COUGH: 0

## 2024-03-07 NOTE — PROGRESS NOTES
Subjective:      Patient ID: Nirmal Dunbar is a 73 y.o. .    HPI  Patient is here for follow up of hypertension and hyperlipidemia.      Patient's BP is controlled on current medications. No chest pain. No leg edema.     His cholesterol is at goal. No myalgia. He has lost another 6 lbs     His anxiety is controlled. He takes Effexor XR.    He had a complete heart block and has a pacemaker.He has moderate AI and mild MR. No changes. He had NSVT on his pacemaker. He had a stress test yesterday and it was normal.     His vision is stable. He has had a prior vitrectomy. He had a secondary cataract of the left eye and had laser done. It helped.     He is due for a repeat colonoscopy. His last colonoscopy was in 7/2018 and it showed a tubular adenoma. I ordered it but he did not do it.        Review of Systems   Constitutional:  Negative for activity change and appetite change.   HENT:  Negative for postnasal drip and rhinorrhea.    Respiratory:  Negative for cough, shortness of breath and wheezing.    Cardiovascular:  Negative for chest pain, palpitations and leg swelling.   Gastrointestinal:  Negative for abdominal pain, nausea and vomiting.   Genitourinary:  Negative for difficulty urinating and frequency.   Musculoskeletal:  Negative for back pain and joint swelling.   Skin:  Positive for rash.   Neurological:  Negative for light-headedness.   Psychiatric/Behavioral:  Negative for sleep disturbance.        Current Outpatient Medications   Medication Instructions    clotrimazole-betamethasone (LOTRISONE) 1-0.05 % cream Apply topically 2 times daily.    pravastatin (PRAVACHOL) 80 MG tablet TAKE ONE TABLET DAILY AT BEDTIME    telmisartan-hydroCHLOROthiazide (MICARDIS HCT) 80-25 MG per tablet TAKE ONE TABLET BY MOUTH DAILY    venlafaxine (EFFEXOR XR) 75 MG extended release capsule TAKE ONE CAPSULE BY MOUTH DAILY          Past Medical History:   Diagnosis Date    Anxiety 12/21/2012    Hyperlipidemia     Hypertension

## 2024-03-11 ENCOUNTER — TELEPHONE (OUTPATIENT)
Dept: CARDIOLOGY CLINIC | Age: 74
End: 2024-03-11

## 2024-03-11 NOTE — TELEPHONE ENCOUNTER
Nirmal Dunbar, 1950    Cardiac Risk Assessment    What type of procedure are you having?  Colonoscopy     When is your procedure scheduled for?  04/02/2024    Medications to be stopped.  NONE     What physician is performing your procedure?  Dr. Zepeda    Phone Number:   [phone]    Fax number to send the letter:   [phone]    Cardiologist:   RODGER    Last Appointment:   02/07/2024    Next Appointment:   08/07/2024    Are you on any blood thinners?   NONE       Device Type and :   [employer] dual

## 2024-03-14 PROCEDURE — 93296 REM INTERROG EVL PM/IDS: CPT | Performed by: INTERNAL MEDICINE

## 2024-03-14 PROCEDURE — 93294 REM INTERROG EVL PM/LDLS PM: CPT | Performed by: INTERNAL MEDICINE

## 2024-06-13 PROCEDURE — 93294 REM INTERROG EVL PM/LDLS PM: CPT | Performed by: INTERNAL MEDICINE

## 2024-06-13 PROCEDURE — 93296 REM INTERROG EVL PM/IDS: CPT | Performed by: INTERNAL MEDICINE

## 2024-06-17 ENCOUNTER — TELEPHONE (OUTPATIENT)
Dept: CARDIOLOGY CLINIC | Age: 74
End: 2024-06-17

## 2024-06-17 NOTE — TELEPHONE ENCOUNTER
Mahad Elizabeth MD  [facility] Russ Helms; Fern Roberts, Shaila now (1:27 PM)       I cannot see that the pacing threshold has changed.  His RV output is programmed at 4.5 V, so we should try to get him in sooner.   Please call patient and schedule in one of the hold slots on 06/19/2024.

## 2024-06-17 NOTE — TELEPHONE ENCOUNTER
----- Message from Miriam Carvajal sent at 6/17/2024  9:57 AM EDT -----  Regarding: RRT as of 6/15/24--OV JMB 8/7  RV HIS BUNDLE PACING-Mr Dunbar has intermittent loss of RV capture at 2.5 V( setting). programmed unipolar pacing and bipolar sensing.    ON 6/10/24 RRT @ 1 month.   Battery triggered RRT 6/15/24.  Ov 8/7.    Are you okay w/ 8/7 appt or need to move up?    See MURJ report under cardiology tab once EP reviews.

## 2024-06-17 NOTE — TELEPHONE ENCOUNTER
JMB-if needing to move appointment up (per Miriam's message RRT triggered 6/15/2024) let me know. I have spots available to move patient to discuss gen change on 6/19, 7/8 or 7/9 if needed.

## 2024-06-18 NOTE — PROGRESS NOTES
file as of 6/19/2024.       Allergies:  Patient has no known allergies.     Review of Systems   Constitutional: Negative.    HENT: Negative.    Eyes: Negative.    Respiratory: Negative.    Cardiovascular: Negative.   Gastrointestinal: Negative.    Genitourinary: Negative.    Musculoskeletal: Negative.    Skin: Negative.    Neurological: Negative.    Hematological: Negative.    Psychiatric/Behavioral: Negative.    /68   Pulse 74   Ht 1.651 m (5' 5\")   Wt 64.8 kg (142 lb 14.4 oz)   SpO2 95%   BMI 23.78 kg/m²     DATA:    ECG 6/19/24: Personally reviewed.     All current cardiac medications reviewed and adjusted accordingly  6/19/24    Device interrogation reviewed and adjusted accordingly 6/19/24    STRESS TEST 3/5/2024   Summary   There is normal isotope uptake at stress and rest. There is no evidence of   myocardial ischemia or scar.   Left ventricular ejection fraction of 73 %.   TID ratio: 1.04.   Overall findings represent a low risk scan.    ECHO 1/30/2023  Summary   Left ventricular systolic function is normal with ejection fraction   estimated at 55-60 %.   No regional wall motion abnormalities are noted.   There is mild concentric left ventricular hypertrophy.Left ventricle size is   normal.   Normal left ventricular diastolic filling pressure.   Mild mitral regurgitation.   Moderate aortic regurgitation is present.   Mild tricuspid regurgitation.   Systolic pulmonary artery pressure (SPAP) estimated at 43 mmHg (RA pressure   3 mmHg), consistent with mild pulmonary hypertension.   Mild pulmonic regurgitation present.   Pacemaker / ICD lead was visualized in the right atrium.   4- 55-60%, SPAP 24 mmHg.    ECHO: 4/20/19: Summary   Normal left ventricular systolic function with ejection fraction of 55-60%.   The study is inadequate to exclude regional wall motion abnormalities.   Systolic pulmonic artery pressure (SPAP) is normal estimated at 24 mmHg   (Right atrial pressure of 3 mmHg).

## 2024-06-19 ENCOUNTER — NURSE ONLY (OUTPATIENT)
Dept: CARDIOLOGY CLINIC | Age: 74
End: 2024-06-19

## 2024-06-19 ENCOUNTER — OFFICE VISIT (OUTPATIENT)
Dept: CARDIOLOGY CLINIC | Age: 74
End: 2024-06-19
Payer: COMMERCIAL

## 2024-06-19 ENCOUNTER — TELEPHONE (OUTPATIENT)
Dept: CARDIOLOGY CLINIC | Age: 74
End: 2024-06-19

## 2024-06-19 VITALS
HEART RATE: 74 BPM | OXYGEN SATURATION: 95 % | BODY MASS INDEX: 23.81 KG/M2 | WEIGHT: 142.9 LBS | HEIGHT: 65 IN | SYSTOLIC BLOOD PRESSURE: 122 MMHG | DIASTOLIC BLOOD PRESSURE: 68 MMHG

## 2024-06-19 DIAGNOSIS — I44.2 COMPLETE HEART BLOCK (HCC): ICD-10-CM

## 2024-06-19 DIAGNOSIS — I44.7 LBBB (LEFT BUNDLE BRANCH BLOCK): Primary | ICD-10-CM

## 2024-06-19 DIAGNOSIS — I44.2 COMPLETE HEART BLOCK (HCC): Primary | ICD-10-CM

## 2024-06-19 DIAGNOSIS — I44.30 AV BLOCK: ICD-10-CM

## 2024-06-19 DIAGNOSIS — Z95.0 PACEMAKER: ICD-10-CM

## 2024-06-19 DIAGNOSIS — Z95.0 PACEMAKER: Primary | ICD-10-CM

## 2024-06-19 PROCEDURE — 3074F SYST BP LT 130 MM HG: CPT | Performed by: INTERNAL MEDICINE

## 2024-06-19 PROCEDURE — 3078F DIAST BP <80 MM HG: CPT | Performed by: INTERNAL MEDICINE

## 2024-06-19 PROCEDURE — 93000 ELECTROCARDIOGRAM COMPLETE: CPT | Performed by: INTERNAL MEDICINE

## 2024-06-19 PROCEDURE — 1123F ACP DISCUSS/DSCN MKR DOCD: CPT | Performed by: INTERNAL MEDICINE

## 2024-06-19 PROCEDURE — 99215 OFFICE O/P EST HI 40 MIN: CPT | Performed by: INTERNAL MEDICINE

## 2024-06-19 NOTE — TELEPHONE ENCOUNTER
Daya-case request placed for gen change with TYRA. TYRA would like this Friday 6/21 if possible.     MEDS TO HOLD:  - telmisartan-HCTZ the morning of the procedure.

## 2024-06-19 NOTE — PATIENT INSTRUCTIONS
Plan:  Discussed risks and benefits of generator change. Patient agreeable and would like to proceed.   Continue with current medications as prescribed.   Continue with remote device checks every 3 months.   Follow up after procedure

## 2024-06-20 PROBLEM — I44.30 AV BLOCK: Status: ACTIVE | Noted: 2024-06-19

## 2024-06-20 NOTE — TELEPHONE ENCOUNTER
Procedure:  Generator Change  Doctor:  Dr. Elizabeth  Date:  6/21/24  Time:  8am  Arrival:  6:30am  Reps:  [employer]  Anesthesia:  n/a      Spoke with patient. Please have patient arrive to the main entrance of [facility] ([address] [place], [place] [id number]) and check in with the registration desk.  They will be directed to the Cath Lab.  Remind patient to be NPO after midnight (8 hours prior). Do not apply lotions/creams on skin the day of procedure.     [facility]

## 2024-06-21 ENCOUNTER — HOSPITAL ENCOUNTER (OUTPATIENT)
Age: 74
Setting detail: OUTPATIENT SURGERY
Discharge: HOME | End: 2024-06-21
Attending: INTERNAL MEDICINE | Admitting: INTERNAL MEDICINE
Payer: COMMERCIAL

## 2024-06-21 VITALS
RESPIRATION RATE: 14 BRPM | HEART RATE: 77 BPM | BODY MASS INDEX: 32.34 KG/M2 | HEIGHT: 65 IN | DIASTOLIC BLOOD PRESSURE: 62 MMHG | SYSTOLIC BLOOD PRESSURE: 115 MMHG | OXYGEN SATURATION: 95 % | WEIGHT: 194.1 LBS

## 2024-06-21 DIAGNOSIS — I44.30 AV BLOCK: ICD-10-CM

## 2024-06-21 DIAGNOSIS — I44.2 COMPLETE HEART BLOCK (HCC): ICD-10-CM

## 2024-06-21 LAB
ANION GAP SERPL CALCULATED.3IONS-SCNC: 8 MMOL/L (ref 3–16)
BUN SERPL-MCNC: 17 MG/DL (ref 7–20)
CALCIUM SERPL-MCNC: 9.7 MG/DL (ref 8.3–10.6)
CHLORIDE SERPL-SCNC: 100 MMOL/L (ref 99–110)
CO2 SERPL-SCNC: 30 MMOL/L (ref 21–32)
CREAT SERPL-MCNC: 0.9 MG/DL (ref 0.8–1.3)
DEPRECATED RDW RBC AUTO: 13.7 % (ref 12.4–15.4)
ECHO BSA: 2.01 M2
EKG ATRIAL RATE: 73 BPM
EKG DIAGNOSIS: NORMAL
EKG P AXIS: 48 DEGREES
EKG P-R INTERVAL: 144 MS
EKG Q-T INTERVAL: 456 MS
EKG QRS DURATION: 156 MS
EKG QTC CALCULATION (BAZETT): 502 MS
EKG R AXIS: -29 DEGREES
EKG T AXIS: 186 DEGREES
EKG VENTRICULAR RATE: 73 BPM
GFR SERPLBLD CREATININE-BSD FMLA CKD-EPI: 89 ML/MIN/{1.73_M2}
GLUCOSE SERPL-MCNC: 103 MG/DL (ref 70–99)
HCT VFR BLD AUTO: 44.9 % (ref 40.5–52.5)
HGB BLD-MCNC: 15.3 G/DL (ref 13.5–17.5)
MCH RBC QN AUTO: 30.7 PG (ref 26–34)
MCHC RBC AUTO-ENTMCNC: 34.2 G/DL (ref 31–36)
MCV RBC AUTO: 89.8 FL (ref 80–100)
PLATELET # BLD AUTO: 180 K/UL (ref 135–450)
PMV BLD AUTO: 8 FL (ref 5–10.5)
POTASSIUM SERPL-SCNC: 3.7 MMOL/L (ref 3.5–5.1)
RBC # BLD AUTO: 5 M/UL (ref 4.2–5.9)
SODIUM SERPL-SCNC: 138 MMOL/L (ref 136–145)
WBC # BLD AUTO: 6.3 K/UL (ref 4–11)

## 2024-06-21 PROCEDURE — 7100000010 HC PHASE II RECOVERY - FIRST 15 MIN: Performed by: INTERNAL MEDICINE

## 2024-06-21 PROCEDURE — 6360000002 HC RX W HCPCS

## 2024-06-21 PROCEDURE — 2500000003 HC RX 250 WO HCPCS: Performed by: INTERNAL MEDICINE

## 2024-06-21 PROCEDURE — 99153 MOD SED SAME PHYS/QHP EA: CPT | Performed by: INTERNAL MEDICINE

## 2024-06-21 PROCEDURE — 33228 REMV&REPLC PM GEN DUAL LEAD: CPT | Performed by: INTERNAL MEDICINE

## 2024-06-21 PROCEDURE — 7100000011 HC PHASE II RECOVERY - ADDTL 15 MIN: Performed by: INTERNAL MEDICINE

## 2024-06-21 PROCEDURE — C1713 ANCHOR/SCREW BN/BN,TIS/BN: HCPCS | Performed by: INTERNAL MEDICINE

## 2024-06-21 PROCEDURE — 6360000002 HC RX W HCPCS: Performed by: INTERNAL MEDICINE

## 2024-06-21 PROCEDURE — 85027 COMPLETE CBC AUTOMATED: CPT

## 2024-06-21 PROCEDURE — 93005 ELECTROCARDIOGRAM TRACING: CPT | Performed by: INTERNAL MEDICINE

## 2024-06-21 PROCEDURE — 80048 BASIC METABOLIC PNL TOTAL CA: CPT

## 2024-06-21 PROCEDURE — 2709999900 HC NON-CHARGEABLE SUPPLY: Performed by: INTERNAL MEDICINE

## 2024-06-21 PROCEDURE — 93010 ELECTROCARDIOGRAM REPORT: CPT | Performed by: INTERNAL MEDICINE

## 2024-06-21 PROCEDURE — C1785 PMKR, DUAL, RATE-RESP: HCPCS | Performed by: INTERNAL MEDICINE

## 2024-06-21 PROCEDURE — 99152 MOD SED SAME PHYS/QHP 5/>YRS: CPT | Performed by: INTERNAL MEDICINE

## 2024-06-21 PROCEDURE — 2500000003 HC RX 250 WO HCPCS

## 2024-06-21 DEVICE — PACEMAKER CARD 22.5GM W50.8XH46.6MM D7.4MM TI POLYUR SIL: Type: IMPLANTABLE DEVICE | Site: CHEST | Status: FUNCTIONAL

## 2024-06-21 RX ORDER — BUPIVACAINE HYDROCHLORIDE 5 MG/ML
INJECTION, SOLUTION EPIDURAL; INTRACAUDAL PRN
Status: DISCONTINUED | OUTPATIENT
Start: 2024-06-21 | End: 2024-06-21 | Stop reason: HOSPADM

## 2024-06-21 RX ORDER — SODIUM CHLORIDE 0.9 % (FLUSH) 0.9 %
5-40 SYRINGE (ML) INJECTION EVERY 12 HOURS SCHEDULED
Status: DISCONTINUED | OUTPATIENT
Start: 2024-06-21 | End: 2024-06-21 | Stop reason: HOSPADM

## 2024-06-21 RX ORDER — SODIUM CHLORIDE 9 MG/ML
INJECTION, SOLUTION INTRAVENOUS PRN
Status: DISCONTINUED | OUTPATIENT
Start: 2024-06-21 | End: 2024-06-21 | Stop reason: HOSPADM

## 2024-06-21 RX ORDER — SODIUM CHLORIDE 0.9 % (FLUSH) 0.9 %
5-40 SYRINGE (ML) INJECTION PRN
Status: DISCONTINUED | OUTPATIENT
Start: 2024-06-21 | End: 2024-06-21 | Stop reason: HOSPADM

## 2024-06-21 RX ORDER — CEFAZOLIN SODIUM IN 0.9 % NACL 2 G/100 ML
2000 PLASTIC BAG, INJECTION (ML) INTRAVENOUS ONCE
Status: COMPLETED | OUTPATIENT
Start: 2024-06-21 | End: 2024-06-21

## 2024-06-21 RX ORDER — MIDAZOLAM HYDROCHLORIDE 1 MG/ML
INJECTION INTRAMUSCULAR; INTRAVENOUS PRN
Status: DISCONTINUED | OUTPATIENT
Start: 2024-06-21 | End: 2024-06-21 | Stop reason: HOSPADM

## 2024-06-21 RX ADMIN — Medication 2000 MG: at 08:14

## 2024-06-21 ASSESSMENT — PULMONARY FUNCTION TESTS
PIF_VALUE: 0
PIF_VALUE: 2
PIF_VALUE: 0
PIF_VALUE: 2
PIF_VALUE: 0
PIF_VALUE: 2
PIF_VALUE: 1
PIF_VALUE: 0

## 2024-06-21 NOTE — SEDATION DOCUMENTATION
Sedation start time: 0850  Sedation stop time: 0925  Mallampati: 3   Pre and post Umberto score: 10/10  Medication given: IV Versed 2.5 mg, IV fentanyl 125 mcg  Pre-Procedure Assessment / Plan:  ASA: Class 2 - A normal healthy patient with mild systemic disease  Level of Sedation Plan:Moderate sedation  Post Procedure plan: Return to same level of care     An independent trained observer pushed medications at my direction. We monitored the patient's level of consciousness and vital signs/physiologic status throughout the procedure duration (see start and start times above).

## 2024-06-21 NOTE — PROGRESS NOTES
Patient/family given discharge instructions. Patient/family verbalize understanding of discharge instructions, all questions addressed, copy given to patient/family. Pt transferred to vehicle via wheelchair to be discharged home with family.  Pt in good spirits.  No c/o voiced.

## 2024-06-21 NOTE — DISCHARGE INSTRUCTIONS
Cath Labs at  [facility]    Pacemaker Discharge Instructions    6/21/2024  Nirmal Dunbar   Date of Birth 1950     Activity:  You can ride in a car, but no driving for 24 hours.  Do not raise your left arm above your heart for 2 weeks.  Resume regular activities in 24 hours.  Return to work/ school in 24 hours.    Diet:    Resume previous diet.    Dressing:  Keep site clean and dry until cleared by your physician.  Remove the outer dressing in 48 hours, leave steri strips intact until seen in office.  Ice pack to pacer site as needed for pain next 24 hours.    Special Instructions:  Report any of the following to physician or 911:  Any difficulty breathing, change in heart rhythm, change in level or consciousness or alertness, fever or chills.  Small amount of bruising and drainage can be expected.  Any questions or concerns please contact your doctor.  If you received contrast during your new pacemaker placement, and you are currently taking Metformin or Metformin combination medications for Diabetes, hold your dose for 48 hours after your procedure.  If you have any questions, please call your doctor.  If you cannot reach your Doctor then call the Emergency Department at  [phone].  Explain to the Emergency Department the procedure you had performed and they will be able to assist you.  If you seek Emergency Care, bring this form with you.      Sedation Discharge Instructions:  For the next 24 hours do not drive a car, operate machinery, power tools or kitchen appliances.    Do not drink alcohol; including beer or wine.    Do not make any important decisions or sign any important papers.  For the next 24 hours you can expect drowsiness, light-headed or dizziness, nausea/ vomiting, inability to concentrate, fatigue and desire to sleep.  We strongly suggest that a responsible adult be with for the next 24 hours, for your protection and safety.  You are not allowed to  drive yourself home, or take any type of public transportation.

## 2024-06-21 NOTE — H&P
Patient Name: Nirmal Dunbar  YOB: 1950     Primary Care Physician: Reese Huynh MD     CHIEF COMPLAINT:        Chief Complaint   Patient presents with    Device Check    6 Month Follow-Up    Other       LBBB         HPI:  Nirmal Dunbar is a 73 y.o. male with PMH of HTN, HLD, and moderate AI. 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 S/p dual chamber PPM (4/19/19) with RV lead implanted at the His bundle. EKG on 5/23/19 showed AS- with HR of 84, consistent with non selective His bundle pacing.  Echo (4/20/19) showed EF of 55-60%. Device interrogation 11/19/19 showed normally functioning PPM.  He reported he felt symptomatic improvement since his PPM implant. He is very active with good exercise tolerance.               On 8/4/20 he presented to the ED for bradycardia and dizziness. His device check at that time showed increased His lead threshold with intermittent capture and output was increased to 5V at 1ms. His device check on 8/13/20 showed ongoing need for His lead output of 5V @ 1ms and draining battery, AP 2% and  100% with no arrhythmias.               His device check 8/17/20 demonstrates RV threshold 1.0v at 1.0 miliseconds with unipolar pacing,  1.5v at 1.0 msec with bipolar pacing. He reported he is no longer having the dizziness or bradycardia. At the conclusion of 8/2020 visit, RV output changed to 2.5V at 1 msec unipolar.               On 7/14/2021, patient's device interrogation demonstrated AP 2.9%,  98.9%, no new events, 4.1 years until RRT. He reported that his energy level has improved since his device was placed in 2019. He reported that he recently experiences an episodes of chest tightness that he attributes to being 5 feet away from a generator.              On 1/12/2022, Device interrogation demonstrated AP 10.8%,  98.2%, events -1 NSVT event, NORIS 4.5 years. He states that he is doing well. He is currently  working doing construction.               Echo 1/30/2023 demonstrated an LVEF of 55-60%.              Stress test 3/5/2024 showed no evidence of ischemia or scar.               On 6/15/2024 device reached RRT.               Today, 6/19/2024, Device interrogation demonstrated AP 14.4%,  99.6%, NORIS now. He reports he has been feeling overall well. He remains active with house work and yard work and tolerates this activity well. Patient denies current edema, chest pain, shortness of breath, palpitations, dizziness or syncope. Patient is taking all cardiac medications as prescribed and tolerates them well.      Past Medical History:   has a past medical history of Anxiety, Hyperlipidemia, Hypertension, Hypertrophy of prostate without urinary obstruction, Mild mitral regurgitation, and Moderate aortic insufficiency.     Surgical History:   has a past surgical history that includes Colonoscopy (4/21/2008); vitrectomy (Left, 2018); Cataract removal with implant (Left, 05/01/2019); Intracapsular cataract extraction (Left, 5/1/2019); Cardiac pacemaker placement (04/19/2019); pacemaker placement (2019); skin biopsy (Left, 3/12/2021); and Skin graft (Left, 3/12/2021).      Social History:   reports that he has never smoked. He has never used smokeless tobacco. He reports that he does not currently use alcohol. He reports that he does not use drugs.      Family History:  family history includes Cancer in his father; Cancer (age of onset: 57) in his brother; No Known Problems in his sister; Other in his mother.      Home Medications:  Encounter Medications          Outpatient Encounter Medications as of 6/19/2024   Medication Sig Dispense Refill    pravastatin (PRAVACHOL) 80 MG tablet TAKE ONE TABLET DAILY AT BEDTIME 90 tablet 3    telmisartan-hydroCHLOROthiazide (MICARDIS HCT) 80-25 MG per tablet TAKE ONE TABLET BY MOUTH DAILY 90 tablet 3    venlafaxine (EFFEXOR XR) 75 MG extended release capsule TAKE ONE CAPSULE BY MOUTH

## 2024-06-24 ENCOUNTER — NURSE ONLY (OUTPATIENT)
Dept: CARDIOLOGY CLINIC | Age: 74
End: 2024-06-24

## 2024-06-25 NOTE — PROGRESS NOTES
Gen change 6/21/2024    DEVICE MODEL  [id number] [employer]i™ XT DR MRI  DEVICE SERIAL NUMBER  [id number]  DEVICE TYPE  Pacemaker  DATE OF IMPLANT  21-Jun-2024  Monitor Information  MONITOR STATUS  Transmission Received  MONITOR MODEL  Smart Phone or Tablet  EMAIL  [email or web address]  Verified: 24-Apr-2019 4:55 PM  PHONE  [phone]  Verified: 24-Apr-2019 5:07 PM  MESSAGE PREFERENCES  Last update:   24-Apr-2019 5:06 PM  Email:   ON  Text Message:   ON  Elaine Notifications:   ON    Patient was RV unipolar paced polarity with an output of 4.5@0.5. Unipolar impedance was 100 ohms lower once we connected to new device so we made him bipolar with an output of 5@0.5. Can you recheck impedance at 1 week? Glenn made mention to check it again. -CARELINK SCHEDULED FOR 6/28

## 2024-06-28 ENCOUNTER — NURSE ONLY (OUTPATIENT)
Dept: CARDIOLOGY CLINIC | Age: 74
End: 2024-06-28

## 2024-06-28 NOTE — PROGRESS NOTES
Patient was RV unipolar paced polarity with an output of 4.5@0.5. Unipolar impedance was 100 ohms lower once we connected to new device (6/21/24) so we made him bipolar with an output of 5@0.5. Can you recheck impedance at 1 week? Glenn made mention to check it again. -CARELINK SCHEDULED FOR 6/28    RV Impedance (bipolar)  Last Measured 399 ohms  RV Impedance (unipolar)  Last Measured 285 ohms  RV Threshold  Capture Monitor  Amplitude 5.00 V  Pulse Width 0.50 ms    See MURJ report under cardiology tab once EP reviews.

## 2024-07-02 ENCOUNTER — NURSE ONLY (OUTPATIENT)
Dept: CARDIOLOGY CLINIC | Age: 74
End: 2024-07-02
Payer: COMMERCIAL

## 2024-07-02 DIAGNOSIS — Z95.0 PACEMAKER: Primary | ICD-10-CM

## 2024-07-02 DIAGNOSIS — I44.2 COMPLETE HEART BLOCK (HCC): ICD-10-CM

## 2024-07-02 PROCEDURE — 93280 PM DEVICE PROGR EVAL DUAL: CPT | Performed by: INTERNAL MEDICINE

## 2024-07-02 NOTE — PATIENT INSTRUCTIONS
ensure successful automatic transmissions in the future.     Please be aware that the remote device transmission sites are periodically monitored only during regular business hours during which simultaneous in-office device clinics are being conducted. If your transmission requires attention, we will contact you as soon as possible.    Your in-home monitor will do a full report on your device every 3 months (recurring appointments that run parallel to in office checks). You do not need to initiate a transmission or be awake at the appointment time listed - this is solely for office purposes.     Our office utilizes the \"no news is good news\" narrative regarding remote monitoring. A Device Specialist or RN will contact you with any critical findings from your remote monitoring.    Going forward, if you experience issues with your in-home monitor, please verify that it is plugged in to the wall. If issues persist, please contact the Customer Service numbers provided below, as they can troubleshoot issues that may be happening with the monitor itself. The Device Clinic works closely with the remote monitoring websites - if we notice there is a disconnection we will contact you to inform you and ask you to contact the  of your device.    [employer] ([employer])  [phone]

## 2024-07-15 ENCOUNTER — OFFICE VISIT (OUTPATIENT)
Dept: INTERNAL MEDICINE CLINIC | Age: 74
End: 2024-07-15

## 2024-07-15 VITALS
RESPIRATION RATE: 12 BRPM | SYSTOLIC BLOOD PRESSURE: 120 MMHG | DIASTOLIC BLOOD PRESSURE: 80 MMHG | HEIGHT: 65 IN | BODY MASS INDEX: 32.15 KG/M2 | HEART RATE: 70 BPM | WEIGHT: 193 LBS

## 2024-07-15 DIAGNOSIS — F41.9 ANXIETY: ICD-10-CM

## 2024-07-15 DIAGNOSIS — Z95.0 PACEMAKER: ICD-10-CM

## 2024-07-15 DIAGNOSIS — I10 PRIMARY HYPERTENSION: Primary | ICD-10-CM

## 2024-07-15 DIAGNOSIS — I34.0 MILD MITRAL REGURGITATION: ICD-10-CM

## 2024-07-15 DIAGNOSIS — E78.5 HYPERLIPIDEMIA, UNSPECIFIED HYPERLIPIDEMIA TYPE: ICD-10-CM

## 2024-07-15 DIAGNOSIS — I35.1 MODERATE AORTIC INSUFFICIENCY: ICD-10-CM

## 2024-07-15 PROCEDURE — 1123F ACP DISCUSS/DSCN MKR DOCD: CPT | Performed by: INTERNAL MEDICINE

## 2024-07-15 PROCEDURE — 3079F DIAST BP 80-89 MM HG: CPT | Performed by: INTERNAL MEDICINE

## 2024-07-15 PROCEDURE — 3074F SYST BP LT 130 MM HG: CPT | Performed by: INTERNAL MEDICINE

## 2024-07-15 PROCEDURE — G8427 DOCREV CUR MEDS BY ELIG CLIN: HCPCS | Performed by: INTERNAL MEDICINE

## 2024-07-15 PROCEDURE — 99214 OFFICE O/P EST MOD 30 MIN: CPT | Performed by: INTERNAL MEDICINE

## 2024-07-15 PROCEDURE — G8417 CALC BMI ABV UP PARAM F/U: HCPCS | Performed by: INTERNAL MEDICINE

## 2024-07-15 PROCEDURE — 1036F TOBACCO NON-USER: CPT | Performed by: INTERNAL MEDICINE

## 2024-07-15 PROCEDURE — 3017F COLORECTAL CA SCREEN DOC REV: CPT | Performed by: INTERNAL MEDICINE

## 2024-07-15 ASSESSMENT — ENCOUNTER SYMPTOMS
BACK PAIN: 0
VOMITING: 0
WHEEZING: 0
ABDOMINAL PAIN: 0
RHINORRHEA: 0
SHORTNESS OF BREATH: 0
NAUSEA: 0
COUGH: 0

## 2024-07-15 NOTE — PROGRESS NOTES
insufficiency 3/6/2018       Past Surgical History:   Procedure Laterality Date    CARDIAC PACEMAKER PLACEMENT  04/19/2019    CATARACT REMOVAL WITH IMPLANT Left 05/01/2019    COLONOSCOPY  4/21/2008    EP DEVICE PROCEDURE N/A 6/21/2024    Remove & replace PPM gen dual lead performed by Mahad Elizabeth MD at [facility] CARDIAC CATH LAB    INTRACAPSULAR CATARACT EXTRACTION Left 5/1/2019    PHACO EMULSIFICATION OF CATARACT WITH  INTRAOCULAR LENS IMPLANT performed by Pb Duke MD at [facility] [place]    PACEMAKER PLACEMENT  2019    SKIN BIOPSY Left 3/12/2021    WIDE LOCAL EXCISION OF LEFT EAR LESION WITH FROZEN SECTION, performed by Mckay Davidson MD at [facility] OR    SKIN GRAFT Left 3/12/2021    FULL THICKNESS SKIN GRAFT performed by Mckay Davidson MD at [facility] OR    VITRECTOMY Left 2018       Family History   Problem Relation Age of Onset    Cancer Brother 57        prostate cancer    Other Mother         Lung fibrosis    Cancer Father         Prostate cancer    No Known Problems Sister        Social History     Tobacco Use    Smoking status: Never    Smokeless tobacco: Never   Vaping Use    Vaping Use: Never used   Substance Use Topics    Alcohol use: Not Currently     Comment: rarely    Drug use: No           /80 (Site: Right Upper Arm, Position: Sitting, Cuff Size: Large Adult)   Pulse 70   Resp 12   Ht 1.651 m (5' 5\")   Wt 87.5 kg (193 lb)   BMI 32.12 kg/m²      Vitals:    07/15/24 0904   BP: 120/80   Site: Right Upper Arm   Position: Sitting   Cuff Size: Large Adult   Pulse: 70   Resp: 12   Weight: 87.5 kg (193 lb)   Height: 1.651 m (5' 5\")        Objective:   Physical Exam  Constitutional:       Appearance: He is well-developed.   HENT:      Head: Normocephalic and atraumatic.   Eyes:      General: No scleral icterus.     Conjunctiva/sclera: Conjunctivae normal.      Pupils: Pupils are equal, round, and reactive to light.   Neck:      Thyroid: No thyroid mass or thyromegaly.      Vascular: No carotid bruit or

## 2024-10-28 ENCOUNTER — NURSE ONLY (OUTPATIENT)
Dept: CARDIOLOGY CLINIC | Age: 74
End: 2024-10-28

## 2024-10-28 ENCOUNTER — OFFICE VISIT (OUTPATIENT)
Dept: CARDIOLOGY CLINIC | Age: 74
End: 2024-10-28
Payer: COMMERCIAL

## 2024-10-28 VITALS
HEIGHT: 65 IN | DIASTOLIC BLOOD PRESSURE: 68 MMHG | HEART RATE: 59 BPM | BODY MASS INDEX: 32.29 KG/M2 | SYSTOLIC BLOOD PRESSURE: 140 MMHG | WEIGHT: 193.8 LBS | OXYGEN SATURATION: 98 %

## 2024-10-28 DIAGNOSIS — I47.29 NSVT (NONSUSTAINED VENTRICULAR TACHYCARDIA) (HCC): ICD-10-CM

## 2024-10-28 DIAGNOSIS — I44.2 COMPLETE HEART BLOCK (HCC): ICD-10-CM

## 2024-10-28 DIAGNOSIS — I44.2 COMPLETE HEART BLOCK (HCC): Primary | ICD-10-CM

## 2024-10-28 DIAGNOSIS — Z95.0 PACEMAKER: Primary | ICD-10-CM

## 2024-10-28 DIAGNOSIS — I44.30 AV BLOCK: ICD-10-CM

## 2024-10-28 DIAGNOSIS — I10 ESSENTIAL HYPERTENSION: ICD-10-CM

## 2024-10-28 DIAGNOSIS — I44.7 LBBB (LEFT BUNDLE BRANCH BLOCK): ICD-10-CM

## 2024-10-28 PROCEDURE — 93000 ELECTROCARDIOGRAM COMPLETE: CPT | Performed by: NURSE PRACTITIONER

## 2024-10-28 PROCEDURE — 3078F DIAST BP <80 MM HG: CPT | Performed by: NURSE PRACTITIONER

## 2024-10-28 PROCEDURE — 99214 OFFICE O/P EST MOD 30 MIN: CPT | Performed by: NURSE PRACTITIONER

## 2024-10-28 PROCEDURE — 1123F ACP DISCUSS/DSCN MKR DOCD: CPT | Performed by: NURSE PRACTITIONER

## 2024-10-28 PROCEDURE — 3077F SYST BP >= 140 MM HG: CPT | Performed by: NURSE PRACTITIONER

## 2024-10-28 NOTE — PATIENT INSTRUCTIONS
No changes today     Remote device transmissions every three months     Monitor BP at home and call if consistently out of goal ranges    Follow up in one year or sooner if needed

## 2024-10-28 NOTE — PROGRESS NOTES
[facility]   Electrophysiology Outpatient Note              Date:  October 28, 2024  Patient name: Nirmal Dunbar  YOB: 1950    Primary Care physician: Reese Huynh MD    HISTORY OF PRESENT ILLNESS: The patient is a 74 y.o.  male with a history of CHB, HTN, HLD, LBBB, MR and AI.   In 4/2019, he was admitted with dizziness.  He developed a 2-1 AV block and CHB with alternating bundle branch block.  He underwent implantation of a dual-chamber pacemaker with a His bundle lead.  Echo showed an EF of 55 to 60%.  In 8/2020, he presented to the ED with complaints of bradycardia and dizziness.  Device interrogation at this time showed an increased His lead threshold with intermittent capture and output was increased to 5V at 1 ms.  In 8/2020, his RV output was changed to 2.5V at 1 ms unipolar.    In 1/2023, echo showed a normal LVEF. In 6/2024, he had a generator change.    Today he is being seen for CHB. EKG shows AP  with a HR of 59. He is feeling well. No complaints. Denies chest pain, palpitations, shortness of breath, and dizziness.         Device check today shows:   Brand: [employer]   Mode: DDD  Normal function   30.85% AP  99.6%     Arrhythmias: brief NSVT  Battery life 5.2 years  RA impedance 494 ohms   RV impedance 285 ohms   RA threshold 0.75 V @ 0.40 ms  RV threshold 2.25 @ 0.40 ms   RA sensitivity 0.30 mV  RV sensitivity 0.45 mV             Past Medical History:   has a past medical history of Anxiety, Hyperlipidemia, Hypertension, Hypertrophy of prostate without urinary obstruction, Mild mitral regurgitation, and Moderate aortic insufficiency.    Past Surgical History:   has a past surgical history that includes Colonoscopy (4/21/2008); vitrectomy (Left, 2018); Cataract removal with implant (Left, 05/01/2019); Intracapsular cataract extraction (Left, 5/1/2019); Cardiac pacemaker placement (04/19/2019); pacemaker placement (2019); skin biopsy (Left,

## 2024-12-16 ENCOUNTER — OFFICE VISIT (OUTPATIENT)
Dept: INTERNAL MEDICINE CLINIC | Age: 74
End: 2024-12-16

## 2024-12-16 VITALS
SYSTOLIC BLOOD PRESSURE: 130 MMHG | WEIGHT: 191 LBS | RESPIRATION RATE: 12 BRPM | BODY MASS INDEX: 31.82 KG/M2 | DIASTOLIC BLOOD PRESSURE: 80 MMHG | HEIGHT: 65 IN | HEART RATE: 70 BPM

## 2024-12-16 DIAGNOSIS — N40.0 HYPERTROPHY OF PROSTATE WITHOUT URINARY OBSTRUCTION: ICD-10-CM

## 2024-12-16 DIAGNOSIS — Z95.0 PACEMAKER: ICD-10-CM

## 2024-12-16 DIAGNOSIS — I34.0 MILD MITRAL REGURGITATION: ICD-10-CM

## 2024-12-16 DIAGNOSIS — I10 PRIMARY HYPERTENSION: ICD-10-CM

## 2024-12-16 DIAGNOSIS — Z00.00 ROUTINE GENERAL MEDICAL EXAMINATION AT A HEALTH CARE FACILITY: Primary | ICD-10-CM

## 2024-12-16 DIAGNOSIS — E78.5 HYPERLIPIDEMIA, UNSPECIFIED HYPERLIPIDEMIA TYPE: ICD-10-CM

## 2024-12-16 DIAGNOSIS — Z80.42 FH: PROSTATE CANCER: ICD-10-CM

## 2024-12-16 DIAGNOSIS — I35.1 NONRHEUMATIC AORTIC VALVE INSUFFICIENCY: ICD-10-CM

## 2024-12-16 DIAGNOSIS — Z00.00 ENCOUNTER FOR WELL ADULT EXAM WITHOUT ABNORMAL FINDINGS: ICD-10-CM

## 2024-12-16 DIAGNOSIS — I35.1 MODERATE AORTIC INSUFFICIENCY: ICD-10-CM

## 2024-12-16 DIAGNOSIS — I44.2 COMPLETE HEART BLOCK (HCC): ICD-10-CM

## 2024-12-16 PROCEDURE — 3079F DIAST BP 80-89 MM HG: CPT | Performed by: INTERNAL MEDICINE

## 2024-12-16 PROCEDURE — 3075F SYST BP GE 130 - 139MM HG: CPT | Performed by: INTERNAL MEDICINE

## 2024-12-16 PROCEDURE — 99397 PER PM REEVAL EST PAT 65+ YR: CPT | Performed by: INTERNAL MEDICINE

## 2024-12-16 RX ORDER — TELMISARTAN AND HYDROCHLORTHIAZIDE 80; 25 MG/1; MG/1
TABLET ORAL
Qty: 90 TABLET | Refills: 3 | Status: SHIPPED | OUTPATIENT
Start: 2024-12-16

## 2024-12-16 RX ORDER — PRAVASTATIN SODIUM 80 MG/1
TABLET ORAL
Qty: 90 TABLET | Refills: 3 | Status: SHIPPED | OUTPATIENT
Start: 2024-12-16

## 2024-12-16 RX ORDER — VENLAFAXINE HYDROCHLORIDE 75 MG/1
CAPSULE, EXTENDED RELEASE ORAL
Qty: 90 CAPSULE | Refills: 3 | Status: SHIPPED | OUTPATIENT
Start: 2024-12-16

## 2024-12-16 ASSESSMENT — ENCOUNTER SYMPTOMS
BACK PAIN: 0
NAUSEA: 0
SHORTNESS OF BREATH: 0
WHEEZING: 0
COUGH: 0
RHINORRHEA: 0
VOMITING: 0
ABDOMINAL PAIN: 0

## 2024-12-16 NOTE — PROGRESS NOTES
is no mass.      Tenderness: There is no abdominal tenderness. There is no rebound.   Musculoskeletal:      Cervical back: Normal range of motion and neck supple.   Lymphadenopathy:      Cervical: No cervical adenopathy.       ECHO DONE ON 12/20/2017  Conclusions      Summary   Normal left ventricle systolic function with an estimated ejection fraction   of 55%.   No regional wall motion abnormalities are seen.   Diastolic filling parameters suggest normal diastolic filing pressure.   Mild mitral regurgitation.   Individual aortic valve leaflets are not clearly visualized, aortic valve   appears sclerotic.   Moderate aortic regurgitation.   Systolic pulmonary artery pressure (SPAP) is normal and estimated at 15 mmHg   (RA pressure of 3 mmHg).    ECHO 1/30/23  Conclusions      Summary   Left ventricular systolic function is normal with ejection fraction   estimated at 55-60 %.   No regional wall motion abnormalities are noted.   There is mild concentric left ventricular hypertrophy.Left ventricle size is   normal.   Normal left ventricular diastolic filling pressure.   Mild mitral regurgitation.   Moderate aortic regurgitation is present.   Mild tricuspid regurgitation.   Systolic pulmonary artery pressure (SPAP) estimated at 43 mmHg (RA pressure   3 mmHg), consistent with mild pulmonary hypertension.   Mild pulmonic regurgitation present.   Pacemaker / ICD lead was visualized in the right atrium.   4- 55-60%, SPAP 24 mmHg.      Signature      ------------------------------------------------------------------   Electronically signed by Matheus Mares MD, [facility] (Interpreting   physician) on 01/30/2023 at 05:44 PM   ------------------------------------------------------------    Assessment:          Diagnosis Orders   1. Routine general medical examination at a health care facility        2. Complete heart block (HCC)        3. Primary hypertension        4. Hyperlipidemia, unspecified hyperlipidemia type  Uric

## 2024-12-16 NOTE — PATIENT INSTRUCTIONS
Well Visit, Over 65: Care Instructions  Well visits can help you stay healthy. Your doctor has checked your overall health and may have suggested ways to take good care of yourself. Your doctor also may have recommended tests. You can help prevent illness with healthy eating, good sleep, vaccinations, regular exercise, and other steps.    Get the tests that you and your doctor decide on. Depending on your age and risks, examples might include hearing tests as well as screening for colon, breast, and lung cancer. Screening helps find diseases before any symptoms appear.   Eat healthy foods. Choose fruits, vegetables, whole grains, lean protein, and low-fat dairy foods. Limit saturated fat, and reduce salt.     Limit alcohol. Men should have no more than 2 drinks a day. Women should have no more than 1. For some people, no alcohol is the best choice.   Exercise. It can help prevent falls. Get at least 30 minutes of exercise on most days of the week. Walking, yoga, and keren chi can be good choices.     Reach and stay at your healthy weight. This will lower your risk for many health problems.   Take care of your mental health. Try to stay connected with friends, family, and community, and find ways to manage stress.     If you're feeling depressed or hopeless, talk to someone. A counselor can help. If you don't have a counselor, talk to your doctor.   Talk to your doctor if you think you may have a problem with alcohol or drug use. This includes prescription medicines and illegal drugs.     Avoid tobacco and nicotine: Don't smoke, vape, or chew. If you need help quitting, talk to your doctor.   Practice safer sex. Getting tested, using condoms or dental dams, and limiting sex partners can help prevent STIs.     Make an advance directive. This is a legal way to tell your family and doctor what you want to happen at the end of your life or when you can't speak for yourself.   Prevent problems where you can. Protect

## 2024-12-17 LAB
ALBUMIN SERPL-MCNC: 4.4 G/DL (ref 3.4–5)
ALBUMIN/GLOB SERPL: 1.6 {RATIO} (ref 1.1–2.2)
ALP SERPL-CCNC: 62 U/L (ref 40–129)
ALT SERPL-CCNC: 21 U/L (ref 10–40)
ANION GAP SERPL CALCULATED.3IONS-SCNC: 14 MMOL/L (ref 3–16)
AST SERPL-CCNC: 29 U/L (ref 15–37)
BASOPHILS # BLD: 0 K/UL (ref 0–0.2)
BASOPHILS NFR BLD: 0.6 %
BILIRUB SERPL-MCNC: 1 MG/DL (ref 0–1)
BUN SERPL-MCNC: 18 MG/DL (ref 7–20)
CALCIUM SERPL-MCNC: 10 MG/DL (ref 8.3–10.6)
CHLORIDE SERPL-SCNC: 102 MMOL/L (ref 99–110)
CHOLEST SERPL-MCNC: 158 MG/DL (ref 0–199)
CO2 SERPL-SCNC: 25 MMOL/L (ref 21–32)
CREAT SERPL-MCNC: 0.8 MG/DL (ref 0.8–1.3)
DEPRECATED RDW RBC AUTO: 13.9 % (ref 12.4–15.4)
EOSINOPHIL # BLD: 0.1 K/UL (ref 0–0.6)
EOSINOPHIL NFR BLD: 1.2 %
GFR SERPLBLD CREATININE-BSD FMLA CKD-EPI: >90 ML/MIN/{1.73_M2}
GLUCOSE SERPL-MCNC: 78 MG/DL (ref 70–99)
HCT VFR BLD AUTO: 46.8 % (ref 40.5–52.5)
HDLC SERPL-MCNC: 55 MG/DL (ref 40–60)
HGB BLD-MCNC: 16.3 G/DL (ref 13.5–17.5)
LDLC SERPL CALC-MCNC: 83 MG/DL
LYMPHOCYTES # BLD: 1.5 K/UL (ref 1–5.1)
LYMPHOCYTES NFR BLD: 21.9 %
MCH RBC QN AUTO: 31.1 PG (ref 26–34)
MCHC RBC AUTO-ENTMCNC: 34.9 G/DL (ref 31–36)
MCV RBC AUTO: 89.4 FL (ref 80–100)
MONOCYTES # BLD: 0.7 K/UL (ref 0–1.3)
MONOCYTES NFR BLD: 9.6 %
NEUTROPHILS # BLD: 4.6 K/UL (ref 1.7–7.7)
NEUTROPHILS NFR BLD: 66.7 %
PLATELET # BLD AUTO: 190 K/UL (ref 135–450)
PMV BLD AUTO: 8.8 FL (ref 5–10.5)
POTASSIUM SERPL-SCNC: 3.7 MMOL/L (ref 3.5–5.1)
PROT SERPL-MCNC: 7.2 G/DL (ref 6.4–8.2)
RBC # BLD AUTO: 5.24 M/UL (ref 4.2–5.9)
SODIUM SERPL-SCNC: 141 MMOL/L (ref 136–145)
TRIGL SERPL-MCNC: 98 MG/DL (ref 0–150)
TSH SERPL DL<=0.005 MIU/L-ACNC: 2.89 UIU/ML (ref 0.27–4.2)
URATE SERPL-MCNC: 6.2 MG/DL (ref 3.5–7.2)
VLDLC SERPL CALC-MCNC: 20 MG/DL
WBC # BLD AUTO: 6.9 K/UL (ref 4–11)

## 2025-02-14 ENCOUNTER — HOSPITAL ENCOUNTER (OUTPATIENT)
Dept: CT IMAGING | Age: 75
Discharge: HOME | End: 2025-02-14
Attending: UROLOGY
Payer: COMMERCIAL

## 2025-02-14 DIAGNOSIS — R35.0 FREQUENCY OF MICTURITION: ICD-10-CM

## 2025-02-14 DIAGNOSIS — Z87.442 PERSONAL HISTORY OF URINARY CALCULI: ICD-10-CM

## 2025-02-14 PROCEDURE — 74176 CT ABD & PELVIS W/O CONTRAST: CPT

## 2025-05-01 ENCOUNTER — OFFICE VISIT (OUTPATIENT)
Dept: INTERNAL MEDICINE CLINIC | Age: 75
End: 2025-05-01

## 2025-05-01 VITALS
HEART RATE: 70 BPM | WEIGHT: 184 LBS | DIASTOLIC BLOOD PRESSURE: 70 MMHG | BODY MASS INDEX: 30.66 KG/M2 | RESPIRATION RATE: 12 BRPM | SYSTOLIC BLOOD PRESSURE: 136 MMHG | HEIGHT: 65 IN

## 2025-05-01 DIAGNOSIS — I10 PRIMARY HYPERTENSION: ICD-10-CM

## 2025-05-01 DIAGNOSIS — I34.0 MILD MITRAL REGURGITATION: ICD-10-CM

## 2025-05-01 DIAGNOSIS — Z95.0 PACEMAKER: ICD-10-CM

## 2025-05-01 DIAGNOSIS — E78.5 HYPERLIPIDEMIA, UNSPECIFIED HYPERLIPIDEMIA TYPE: ICD-10-CM

## 2025-05-01 DIAGNOSIS — I44.2 COMPLETE HEART BLOCK (HCC): ICD-10-CM

## 2025-05-01 DIAGNOSIS — I10 PRIMARY HYPERTENSION: Primary | ICD-10-CM

## 2025-05-01 LAB
ALBUMIN SERPL-MCNC: 4.4 G/DL (ref 3.4–5)
ALBUMIN/GLOB SERPL: 1.7 {RATIO} (ref 1.1–2.2)
ALP SERPL-CCNC: 61 U/L (ref 40–129)
ALT SERPL-CCNC: 21 U/L (ref 10–40)
ANION GAP SERPL CALCULATED.3IONS-SCNC: 11 MMOL/L (ref 3–16)
AST SERPL-CCNC: 28 U/L (ref 15–37)
BASOPHILS # BLD: 0 K/UL (ref 0–0.2)
BASOPHILS NFR BLD: 0.5 %
BILIRUB SERPL-MCNC: 0.9 MG/DL (ref 0–1)
BUN SERPL-MCNC: 16 MG/DL (ref 7–20)
CALCIUM SERPL-MCNC: 9.9 MG/DL (ref 8.3–10.6)
CHLORIDE SERPL-SCNC: 105 MMOL/L (ref 99–110)
CO2 SERPL-SCNC: 26 MMOL/L (ref 21–32)
CREAT SERPL-MCNC: 0.9 MG/DL (ref 0.8–1.3)
DEPRECATED RDW RBC AUTO: 13.5 % (ref 12.4–15.4)
EOSINOPHIL # BLD: 0.1 K/UL (ref 0–0.6)
EOSINOPHIL NFR BLD: 1.1 %
GFR SERPLBLD CREATININE-BSD FMLA CKD-EPI: 89 ML/MIN/{1.73_M2}
GLUCOSE SERPL-MCNC: 90 MG/DL (ref 70–99)
HCT VFR BLD AUTO: 46.3 % (ref 40.5–52.5)
HGB BLD-MCNC: 16.2 G/DL (ref 13.5–17.5)
LYMPHOCYTES # BLD: 1.3 K/UL (ref 1–5.1)
LYMPHOCYTES NFR BLD: 20.5 %
MCH RBC QN AUTO: 30.6 PG (ref 26–34)
MCHC RBC AUTO-ENTMCNC: 35 G/DL (ref 31–36)
MCV RBC AUTO: 87.5 FL (ref 80–100)
MONOCYTES # BLD: 0.6 K/UL (ref 0–1.3)
MONOCYTES NFR BLD: 10.2 %
NEUTROPHILS # BLD: 4.3 K/UL (ref 1.7–7.7)
NEUTROPHILS NFR BLD: 67.7 %
PLATELET # BLD AUTO: 200 K/UL (ref 135–450)
PMV BLD AUTO: 8.7 FL (ref 5–10.5)
POTASSIUM SERPL-SCNC: 3.8 MMOL/L (ref 3.5–5.1)
PROT SERPL-MCNC: 7 G/DL (ref 6.4–8.2)
RBC # BLD AUTO: 5.28 M/UL (ref 4.2–5.9)
SODIUM SERPL-SCNC: 142 MMOL/L (ref 136–145)
URATE SERPL-MCNC: 7.1 MG/DL (ref 3.5–7.2)
WBC # BLD AUTO: 6.4 K/UL (ref 4–11)

## 2025-05-01 PROCEDURE — 99214 OFFICE O/P EST MOD 30 MIN: CPT | Performed by: INTERNAL MEDICINE

## 2025-05-01 PROCEDURE — 1160F RVW MEDS BY RX/DR IN RCRD: CPT | Performed by: INTERNAL MEDICINE

## 2025-05-01 PROCEDURE — G8427 DOCREV CUR MEDS BY ELIG CLIN: HCPCS | Performed by: INTERNAL MEDICINE

## 2025-05-01 PROCEDURE — 1123F ACP DISCUSS/DSCN MKR DOCD: CPT | Performed by: INTERNAL MEDICINE

## 2025-05-01 PROCEDURE — 3078F DIAST BP <80 MM HG: CPT | Performed by: INTERNAL MEDICINE

## 2025-05-01 PROCEDURE — 1036F TOBACCO NON-USER: CPT | Performed by: INTERNAL MEDICINE

## 2025-05-01 PROCEDURE — 1159F MED LIST DOCD IN RCRD: CPT | Performed by: INTERNAL MEDICINE

## 2025-05-01 PROCEDURE — 3075F SYST BP GE 130 - 139MM HG: CPT | Performed by: INTERNAL MEDICINE

## 2025-05-01 PROCEDURE — G8417 CALC BMI ABV UP PARAM F/U: HCPCS | Performed by: INTERNAL MEDICINE

## 2025-05-01 PROCEDURE — 3017F COLORECTAL CA SCREEN DOC REV: CPT | Performed by: INTERNAL MEDICINE

## 2025-05-01 ASSESSMENT — ENCOUNTER SYMPTOMS
BACK PAIN: 0
RHINORRHEA: 0
ABDOMINAL PAIN: 0
NAUSEA: 0
SHORTNESS OF BREATH: 0
VOMITING: 0
COUGH: 0
WHEEZING: 0

## 2025-05-01 ASSESSMENT — PATIENT HEALTH QUESTIONNAIRE - PHQ9
1. LITTLE INTEREST OR PLEASURE IN DOING THINGS: NOT AT ALL
SUM OF ALL RESPONSES TO PHQ QUESTIONS 1-9: 0
2. FEELING DOWN, DEPRESSED OR HOPELESS: NOT AT ALL
SUM OF ALL RESPONSES TO PHQ QUESTIONS 1-9: 0

## 2025-05-01 NOTE — PROGRESS NOTES
Subjective:      Patient ID: Nirmal Dunbar is a 74 y.o. .    HPI  Patient is here for follow up of hypertension and hyperlipidemia.      Patient's BP is controlled on current medications. No chest pain. No leg edema.     His cholesterol is at goal. No myalgia. He has lost another 8 lbs     His anxiety is controlled. He takes Effexor XR.    He had a complete heart block and has a pacemaker.He has moderate AI and mild MR. No changes. He had NSVT on his pacemaker. He had a stress test yesterday and it was normal. His pacemaker had reached RRT and generator was changed.  It is working well    His vision is stable. He has had a prior vitrectomy. He had a secondary cataract of the left eye and had laser done. It helped.           Review of Systems   Constitutional:  Negative for activity change and appetite change.   HENT:  Negative for postnasal drip and rhinorrhea.    Respiratory:  Negative for cough, shortness of breath and wheezing.    Cardiovascular:  Negative for chest pain, palpitations and leg swelling.   Gastrointestinal:  Negative for abdominal pain, nausea and vomiting.   Genitourinary:  Negative for difficulty urinating and frequency.   Musculoskeletal:  Negative for back pain and joint swelling.   Skin:  Positive for rash.   Neurological:  Negative for light-headedness.   Psychiatric/Behavioral:  Negative for sleep disturbance.        Current Outpatient Medications   Medication Instructions    pravastatin (PRAVACHOL) 80 MG tablet TAKE ONE TABLET DAILY AT BEDTIME    telmisartan-hydroCHLOROthiazide (MICARDIS HCT) 80-25 MG per tablet TAKE ONE TABLET BY MOUTH DAILY    venlafaxine (EFFEXOR XR) 75 MG extended release capsule TAKE ONE CAPSULE BY MOUTH DAILY          Past Medical History:   Diagnosis Date    Anxiety 12/21/2012    Hyperlipidemia     Hypertension     Hypertrophy of prostate without urinary obstruction     Mild mitral regurgitation 3/6/2018    Moderate aortic insufficiency 3/6/2018       Past Surgical

## 2025-05-02 ENCOUNTER — RESULTS FOLLOW-UP (OUTPATIENT)
Dept: INTERNAL MEDICINE CLINIC | Age: 75
End: 2025-05-02

## 2025-09-02 SDOH — ECONOMIC STABILITY: TRANSPORTATION INSECURITY
IN THE PAST 12 MONTHS, HAS THE LACK OF TRANSPORTATION KEPT YOU FROM MEDICAL APPOINTMENTS OR FROM GETTING MEDICATIONS?: NO

## 2025-09-02 SDOH — ECONOMIC STABILITY: FOOD INSECURITY: WITHIN THE PAST 12 MONTHS, YOU WORRIED THAT YOUR FOOD WOULD RUN OUT BEFORE YOU GOT MONEY TO BUY MORE.: NEVER TRUE

## 2025-09-02 SDOH — ECONOMIC STABILITY: FOOD INSECURITY: WITHIN THE PAST 12 MONTHS, THE FOOD YOU BOUGHT JUST DIDN'T LAST AND YOU DIDN'T HAVE MONEY TO GET MORE.: NEVER TRUE

## 2025-09-02 SDOH — HEALTH STABILITY: PHYSICAL HEALTH: ON AVERAGE, HOW MANY DAYS PER WEEK DO YOU ENGAGE IN MODERATE TO STRENUOUS EXERCISE (LIKE A BRISK WALK)?: 7 DAYS

## 2025-09-02 SDOH — ECONOMIC STABILITY: INCOME INSECURITY: IN THE LAST 12 MONTHS, WAS THERE A TIME WHEN YOU WERE NOT ABLE TO PAY THE MORTGAGE OR RENT ON TIME?: NO

## 2025-09-02 SDOH — HEALTH STABILITY: PHYSICAL HEALTH: ON AVERAGE, HOW MANY MINUTES DO YOU ENGAGE IN EXERCISE AT THIS LEVEL?: 60 MIN

## 2025-09-02 ASSESSMENT — LIFESTYLE VARIABLES
HOW OFTEN DO YOU HAVE SIX OR MORE DRINKS ON ONE OCCASION: 1
HOW OFTEN DO YOU HAVE A DRINK CONTAINING ALCOHOL: NEVER
HOW OFTEN DO YOU HAVE A DRINK CONTAINING ALCOHOL: 1
HOW MANY STANDARD DRINKS CONTAINING ALCOHOL DO YOU HAVE ON A TYPICAL DAY: PATIENT DOES NOT DRINK
HOW MANY STANDARD DRINKS CONTAINING ALCOHOL DO YOU HAVE ON A TYPICAL DAY: 0

## 2025-09-02 ASSESSMENT — PATIENT HEALTH QUESTIONNAIRE - PHQ9
SUM OF ALL RESPONSES TO PHQ QUESTIONS 1-9: 0
1. LITTLE INTEREST OR PLEASURE IN DOING THINGS: NOT AT ALL
SUM OF ALL RESPONSES TO PHQ QUESTIONS 1-9: 0
2. FEELING DOWN, DEPRESSED OR HOPELESS: NOT AT ALL
SUM OF ALL RESPONSES TO PHQ QUESTIONS 1-9: 0
SUM OF ALL RESPONSES TO PHQ QUESTIONS 1-9: 0

## 2025-09-04 ENCOUNTER — OFFICE VISIT (OUTPATIENT)
Dept: INTERNAL MEDICINE CLINIC | Age: 75
End: 2025-09-04

## 2025-09-04 VITALS
RESPIRATION RATE: 12 BRPM | HEART RATE: 70 BPM | HEIGHT: 65 IN | SYSTOLIC BLOOD PRESSURE: 136 MMHG | DIASTOLIC BLOOD PRESSURE: 80 MMHG | WEIGHT: 188 LBS | BODY MASS INDEX: 31.32 KG/M2

## 2025-09-04 DIAGNOSIS — Z00.00 INITIAL MEDICARE ANNUAL WELLNESS VISIT: Primary | ICD-10-CM

## 2025-09-04 DIAGNOSIS — I34.0 MILD MITRAL REGURGITATION: ICD-10-CM

## 2025-09-04 DIAGNOSIS — E78.5 HYPERLIPIDEMIA, UNSPECIFIED HYPERLIPIDEMIA TYPE: ICD-10-CM

## 2025-09-04 DIAGNOSIS — F41.9 ANXIETY: ICD-10-CM

## 2025-09-04 DIAGNOSIS — I10 PRIMARY HYPERTENSION: ICD-10-CM

## 2025-09-04 DIAGNOSIS — I44.2 COMPLETE HEART BLOCK (HCC): ICD-10-CM

## 2025-09-04 DIAGNOSIS — I35.1 MODERATE AORTIC INSUFFICIENCY: ICD-10-CM

## 2025-09-04 DIAGNOSIS — N40.0 HYPERTROPHY OF PROSTATE WITHOUT URINARY OBSTRUCTION: ICD-10-CM

## 2025-09-04 PROCEDURE — 1159F MED LIST DOCD IN RCRD: CPT | Performed by: INTERNAL MEDICINE

## 2025-09-04 PROCEDURE — 1160F RVW MEDS BY RX/DR IN RCRD: CPT | Performed by: INTERNAL MEDICINE

## 2025-09-04 PROCEDURE — 3079F DIAST BP 80-89 MM HG: CPT | Performed by: INTERNAL MEDICINE

## 2025-09-04 PROCEDURE — 1123F ACP DISCUSS/DSCN MKR DOCD: CPT | Performed by: INTERNAL MEDICINE

## 2025-09-04 PROCEDURE — G0438 PPPS, INITIAL VISIT: HCPCS | Performed by: INTERNAL MEDICINE

## 2025-09-04 PROCEDURE — 3075F SYST BP GE 130 - 139MM HG: CPT | Performed by: INTERNAL MEDICINE

## (undated) DEVICE — CANNULA NSL 13FT TUBE AD ETCO2 DIV SAMP M

## (undated) DEVICE — SYRINGE MED 10ML TRNSLUC BRL PLUNG BLK MRK POLYPR CTRL

## (undated) DEVICE — SUTURE VICRYL + SZ 3-0 L18IN ABSRB UD SH 1/2 CIR TAPERCUT NDL VCP864D

## (undated) DEVICE — NEEDLE 26GAX3/8IN HYPO INTRADERMAL BVL

## (undated) DEVICE — ELECTRODE NDL 2.8IN COAT VALLEYLAB

## (undated) DEVICE — SUTURE NONABSORBABLE MONOFILAMENT 5-0 PS-2 18 IN BLK ETHILON 1666H

## (undated) DEVICE — INTENDED FOR TISSUE SEPARATION, AND OTHER PROCEDURES THAT REQUIRE A SHARP SURGICAL BLADE TO PUNCTURE OR CUT.: Brand: BARD-PARKER ® CARBON RIB-BACK BLADES

## (undated) DEVICE — GLOVE SURG SZ 6 THK91MIL LTX FREE SYN POLYISOPRENE ANTI

## (undated) DEVICE — SYRINGE 60ML BULB IRRIG ST LF

## (undated) DEVICE — PACEMAKER PACK: Brand: MEDLINE INDUSTRIES, INC.

## (undated) DEVICE — INTEGRA® JARIT® FRAZIER SUCTION TUBE, 7-1/4", ANGLED, 12 FRENCH, 4" WORKING LENGTH: Brand: INTEGRA® JARIT®

## (undated) DEVICE — GLOVE ORANGE PI 7 1/2   MSG9075

## (undated) DEVICE — Z CONVERTED USE 2271043 CONTAINER SPEC COLL 4OZ SCR ON LID PEEL PCH

## (undated) DEVICE — SKIN MARKER REGULAR TIP WITH RULER CAP AND LABELS: Brand: DEVON

## (undated) DEVICE — TRAY PREP DRY W/ PREM GLV 2 APPL 6 SPNG 2 UNDPD 1 OVERWRAP

## (undated) DEVICE — PLASMABLADE PS200-001 NEEDLE: Brand: PLASMABLADE™

## (undated) DEVICE — MINOR SET UP: Brand: MEDLINE INDUSTRIES, INC.

## (undated) DEVICE — SOLUTION IV IRRIG POUR BRL 0.9% SODIUM CHL 2F7124

## (undated) DEVICE — Device

## (undated) DEVICE — SUTURE VCRL SZ 4-0 L27IN ABSRB UD L17MM RB-1 1/2 CIR J214H

## (undated) DEVICE — ELECTROSURGICAL PENCIL ROCKER SWITCH NON COATED BLADE ELECTRODE 10 FT (3 M) CORD HOLSTER: Brand: MEGADYNE

## (undated) DEVICE — BLADE ES ELASTOMERIC COAT INSUL DURABLE BEND UPTO 90DEG

## (undated) DEVICE — SOLUTION IV IRRIG WATER 1000ML POUR BRL 2F7114

## (undated) DEVICE — VISCOAT OPTH SOLN 0.5ML VISCOELASTIC

## (undated) DEVICE — BANDAGE,ADHESIVE,PLASTIC,1"X3",ST,LF: Brand: MEDLINE

## (undated) DEVICE — PACK,EENT,TURBAN DRAPE,PK II: Brand: MEDLINE

## (undated) DEVICE — SOLUTION IV IRRIG 500ML 0.9% SODIUM CHL 2F7123

## (undated) DEVICE — SUTURE PLN GUT SZ 5-0 L18IN ABSRB YELLOWISH TAN L13MM PC-1 1915G

## (undated) DEVICE — 1010 S-DRAPE TOWEL DRAPE 10/BX: Brand: STERI-DRAPE™

## (undated) DEVICE — SUTURE VCRL SZ 3-0 L27IN ABSRB UD L17MM RB-1 1/2 CIR J215H

## (undated) DEVICE — MAGNETIC INSTRUMENT PAD 10" X 16"; MEDIUM; DISPOSABLE: Brand: CARDINAL HEALTH

## (undated) DEVICE — SUTURE ETHLN SZ 2-0 L18IN NONABSORBABLE BLK L19MM PS-2 PRIM 593H

## (undated) DEVICE — GAUZE,SPONGE,4"X4",16PLY,XRAY,STRL,LF: Brand: MEDLINE

## (undated) DEVICE — SURGICAL PROCEDURE PACK EYE CLERMONT

## (undated) DEVICE — SUTURE NONABSORBABLE MONOFILAMENT 5-0 M1 P-3 18 IN PROLENE 8698H

## (undated) DEVICE — NEEDLE HYPO 25GA L1.5IN BLU POLYPR HUB S STL REG BVL STR

## (undated) DEVICE — MICROSURGICAL INSTRUMENT ANTERIOR CHAMBER CANNULA 30GA: Brand: ALCON

## (undated) DEVICE — DRESSING,GAUZE,XEROFORM,CURAD,1"X8",ST: Brand: CURAD

## (undated) DEVICE — SUTURE VICRYL SZ 4-0 L27IN ABSRB UD L26MM SH 1/2 CIR J415H